# Patient Record
Sex: FEMALE | Race: WHITE | NOT HISPANIC OR LATINO | Employment: UNEMPLOYED | URBAN - METROPOLITAN AREA
[De-identification: names, ages, dates, MRNs, and addresses within clinical notes are randomized per-mention and may not be internally consistent; named-entity substitution may affect disease eponyms.]

---

## 2017-02-20 DIAGNOSIS — J44.9 COPD (CHRONIC OBSTRUCTIVE PULMONARY DISEASE): ICD-10-CM

## 2017-02-20 RX ORDER — TIOTROPIUM BROMIDE 18 UG/1
CAPSULE ORAL; RESPIRATORY (INHALATION)
Qty: 30 CAPSULE | Refills: 11 | OUTPATIENT
Start: 2017-02-20

## 2017-09-05 RX ORDER — FLUTICASONE PROPIONATE AND SALMETEROL 50; 250 UG/1; UG/1
POWDER RESPIRATORY (INHALATION)
Qty: 60 EACH | Refills: 11 | OUTPATIENT
Start: 2017-09-05

## 2021-05-03 ENCOUNTER — HOSPITAL ENCOUNTER (EMERGENCY)
Facility: HOSPITAL | Age: 61
Discharge: HOME | End: 2021-05-03
Attending: EMERGENCY MEDICINE
Payer: COMMERCIAL

## 2021-05-03 VITALS
TEMPERATURE: 98 F | RESPIRATION RATE: 23 BRPM | OXYGEN SATURATION: 100 % | BODY MASS INDEX: 21.67 KG/M2 | SYSTOLIC BLOOD PRESSURE: 112 MMHG | HEIGHT: 66 IN | DIASTOLIC BLOOD PRESSURE: 64 MMHG | WEIGHT: 134.81 LBS | HEART RATE: 79 BPM

## 2021-05-03 DIAGNOSIS — N39.0 URINARY TRACT INFECTION WITHOUT HEMATURIA, SITE UNSPECIFIED: ICD-10-CM

## 2021-05-03 DIAGNOSIS — R00.2 PALPITATIONS: ICD-10-CM

## 2021-05-03 DIAGNOSIS — R53.1 WEAKNESS: Primary | ICD-10-CM

## 2021-05-03 LAB
ALBUMIN SERPL BCP-MCNC: 3.1 G/DL (ref 3.5–5.2)
ALP SERPL-CCNC: 54 U/L (ref 55–135)
ALT SERPL W/O P-5'-P-CCNC: 7 U/L (ref 10–44)
ANION GAP SERPL CALC-SCNC: 15 MMOL/L (ref 8–16)
AST SERPL-CCNC: 11 U/L (ref 10–40)
BACTERIA #/AREA URNS HPF: NORMAL /HPF
BASOPHILS NFR BLD: 0 % (ref 0–1.9)
BILIRUB SERPL-MCNC: 0.3 MG/DL (ref 0.1–1)
BILIRUB UR QL STRIP: ABNORMAL
BNP SERPL-MCNC: 76 PG/ML (ref 0–99)
BUN SERPL-MCNC: 8 MG/DL (ref 8–23)
CALCIUM SERPL-MCNC: 8.4 MG/DL (ref 8.7–10.5)
CHLORIDE SERPL-SCNC: 95 MMOL/L (ref 95–110)
CK SERPL-CCNC: 42 U/L (ref 20–180)
CLARITY UR: CLEAR
CO2 SERPL-SCNC: 25 MMOL/L (ref 23–29)
COLOR UR: ABNORMAL
CREAT SERPL-MCNC: 0.8 MG/DL (ref 0.5–1.4)
DIFFERENTIAL METHOD: ABNORMAL
EOSINOPHIL NFR BLD: 0 % (ref 0–8)
ERYTHROCYTE [DISTWIDTH] IN BLOOD BY AUTOMATED COUNT: 14 % (ref 11.5–14.5)
EST. GFR  (AFRICAN AMERICAN): >60 ML/MIN/1.73 M^2
EST. GFR  (NON AFRICAN AMERICAN): >60 ML/MIN/1.73 M^2
GLUCOSE SERPL-MCNC: 75 MG/DL (ref 70–110)
GLUCOSE UR QL STRIP: ABNORMAL
HCT VFR BLD AUTO: 31.4 % (ref 37–48.5)
HGB BLD-MCNC: 9.8 G/DL (ref 12–16)
HGB UR QL STRIP: ABNORMAL
IMM GRANULOCYTES # BLD AUTO: ABNORMAL K/UL (ref 0–0.04)
IMM GRANULOCYTES NFR BLD AUTO: ABNORMAL % (ref 0–0.5)
KETONES UR QL STRIP: ABNORMAL
LEUKOCYTE ESTERASE UR QL STRIP: ABNORMAL
LYMPHOCYTES NFR BLD: 64 % (ref 18–48)
MCH RBC QN AUTO: 28.7 PG (ref 27–31)
MCHC RBC AUTO-ENTMCNC: 31.2 G/DL (ref 32–36)
MCV RBC AUTO: 92 FL (ref 82–98)
MICROSCOPIC COMMENT: NORMAL
MONOCYTES NFR BLD: 8 % (ref 4–15)
NEUTROPHILS NFR BLD: 28 % (ref 38–73)
NITRITE UR QL STRIP: ABNORMAL
NRBC BLD-RTO: 0 /100 WBC
PH UR STRIP: ABNORMAL [PH] (ref 5–8)
PLATELET # BLD AUTO: 321 K/UL (ref 150–450)
PMV BLD AUTO: 9.8 FL (ref 9.2–12.9)
POTASSIUM SERPL-SCNC: 3.8 MMOL/L (ref 3.5–5.1)
PROT SERPL-MCNC: 6.1 G/DL (ref 6–8.4)
PROT UR QL STRIP: ABNORMAL
RBC # BLD AUTO: 3.41 M/UL (ref 4–5.4)
RBC #/AREA URNS HPF: 1 /HPF (ref 0–4)
SODIUM SERPL-SCNC: 135 MMOL/L (ref 136–145)
SP GR UR STRIP: ABNORMAL (ref 1–1.03)
TROPONIN I SERPL DL<=0.01 NG/ML-MCNC: <0.006 NG/ML (ref 0–0.03)
URN SPEC COLLECT METH UR: ABNORMAL
UROBILINOGEN UR STRIP-ACNC: ABNORMAL EU/DL
WBC # BLD AUTO: 21.18 K/UL (ref 3.9–12.7)
WBC #/AREA URNS HPF: 1 /HPF (ref 0–5)

## 2021-05-03 PROCEDURE — 93010 EKG 12-LEAD: ICD-10-PCS | Mod: ,,, | Performed by: INTERNAL MEDICINE

## 2021-05-03 PROCEDURE — 84484 ASSAY OF TROPONIN QUANT: CPT | Performed by: EMERGENCY MEDICINE

## 2021-05-03 PROCEDURE — 93010 ELECTROCARDIOGRAM REPORT: CPT | Mod: ,,, | Performed by: INTERNAL MEDICINE

## 2021-05-03 PROCEDURE — 81000 URINALYSIS NONAUTO W/SCOPE: CPT | Performed by: EMERGENCY MEDICINE

## 2021-05-03 PROCEDURE — 99285 EMERGENCY DEPT VISIT HI MDM: CPT | Mod: 25

## 2021-05-03 PROCEDURE — 93005 ELECTROCARDIOGRAM TRACING: CPT

## 2021-05-03 PROCEDURE — 82550 ASSAY OF CK (CPK): CPT | Performed by: EMERGENCY MEDICINE

## 2021-05-03 PROCEDURE — 80053 COMPREHEN METABOLIC PANEL: CPT | Performed by: EMERGENCY MEDICINE

## 2021-05-03 PROCEDURE — 25000003 PHARM REV CODE 250: Performed by: EMERGENCY MEDICINE

## 2021-05-03 PROCEDURE — 85027 COMPLETE CBC AUTOMATED: CPT | Performed by: EMERGENCY MEDICINE

## 2021-05-03 PROCEDURE — 85060 PATHOLOGIST REVIEW: ICD-10-PCS | Mod: ,,, | Performed by: STUDENT IN AN ORGANIZED HEALTH CARE EDUCATION/TRAINING PROGRAM

## 2021-05-03 PROCEDURE — 85060 BLOOD SMEAR INTERPRETATION: CPT | Mod: ,,, | Performed by: STUDENT IN AN ORGANIZED HEALTH CARE EDUCATION/TRAINING PROGRAM

## 2021-05-03 PROCEDURE — 83880 ASSAY OF NATRIURETIC PEPTIDE: CPT | Performed by: EMERGENCY MEDICINE

## 2021-05-03 PROCEDURE — 85007 BL SMEAR W/DIFF WBC COUNT: CPT | Performed by: EMERGENCY MEDICINE

## 2021-05-03 RX ORDER — DOXYCYCLINE 100 MG/1
100 CAPSULE ORAL 2 TIMES DAILY
Qty: 20 CAPSULE | Refills: 0 | Status: SHIPPED | OUTPATIENT
Start: 2021-05-03 | End: 2021-05-10

## 2021-05-03 RX ORDER — OXYCODONE AND ACETAMINOPHEN 10; 325 MG/1; MG/1
1 TABLET ORAL
Status: COMPLETED | OUTPATIENT
Start: 2021-05-03 | End: 2021-05-03

## 2021-05-03 RX ADMIN — OXYCODONE AND ACETAMINOPHEN 1 TABLET: 10; 325 TABLET ORAL at 03:05

## 2021-05-04 LAB — PATH REV BLD -IMP: NORMAL

## 2021-05-10 ENCOUNTER — PATIENT MESSAGE (OUTPATIENT)
Dept: RESEARCH | Facility: HOSPITAL | Age: 61
End: 2021-05-10

## 2022-11-06 ENCOUNTER — HOSPITAL ENCOUNTER (INPATIENT)
Facility: HOSPITAL | Age: 62
LOS: 1 days | Discharge: HOME | DRG: 189 | End: 2022-11-08
Attending: EMERGENCY MEDICINE | Admitting: INTERNAL MEDICINE
Payer: COMMERCIAL

## 2022-11-06 DIAGNOSIS — J44.9 COPD, VERY SEVERE: Chronic | ICD-10-CM

## 2022-11-06 DIAGNOSIS — R06.02 SOB (SHORTNESS OF BREATH): Primary | ICD-10-CM

## 2022-11-06 DIAGNOSIS — J18.9 PNEUMONIA OF BOTH LOWER LOBES DUE TO INFECTIOUS ORGANISM: ICD-10-CM

## 2022-11-06 DIAGNOSIS — R50.9 FEVER, UNSPECIFIED FEVER CAUSE: ICD-10-CM

## 2022-11-06 PROBLEM — N30.00 ACUTE CYSTITIS WITHOUT HEMATURIA: Status: ACTIVE | Noted: 2022-11-06

## 2022-11-06 LAB
ACANTHOCYTES BLD QL SMEAR: PRESENT
ALBUMIN SERPL BCP-MCNC: 3.7 G/DL (ref 3.5–5.2)
ALLENS TEST: ABNORMAL
ALP SERPL-CCNC: 76 U/L (ref 55–135)
ALT SERPL W/O P-5'-P-CCNC: 8 U/L (ref 10–44)
ANION GAP SERPL CALC-SCNC: 20 MMOL/L (ref 8–16)
ANISOCYTOSIS BLD QL SMEAR: SLIGHT
AST SERPL-CCNC: 19 U/L (ref 10–40)
BACTERIA #/AREA URNS HPF: NORMAL /HPF
BASOPHILS NFR BLD: 0 % (ref 0–1.9)
BILIRUB SERPL-MCNC: 0.5 MG/DL (ref 0.1–1)
BILIRUB UR QL STRIP: ABNORMAL
BNP SERPL-MCNC: 90 PG/ML (ref 0–99)
BUN SERPL-MCNC: 9 MG/DL (ref 8–23)
CALCIUM SERPL-MCNC: 8.3 MG/DL (ref 8.7–10.5)
CHLORIDE SERPL-SCNC: 95 MMOL/L (ref 95–110)
CK SERPL-CCNC: 54 U/L (ref 20–180)
CLARITY UR: CLEAR
CO2 SERPL-SCNC: 27 MMOL/L (ref 23–29)
COLOR UR: YELLOW
CREAT SERPL-MCNC: 0.7 MG/DL (ref 0.5–1.4)
CTP QC/QA: YES
CTP QC/QA: YES
DELSYS: ABNORMAL
DIFFERENTIAL METHOD: ABNORMAL
EOSINOPHIL NFR BLD: 1 % (ref 0–8)
ERYTHROCYTE [DISTWIDTH] IN BLOOD BY AUTOMATED COUNT: 13.8 % (ref 11.5–14.5)
EST. GFR  (NO RACE VARIABLE): >60 ML/MIN/1.73 M^2
FLOW: 3
GIANT PLATELETS BLD QL SMEAR: PRESENT
GLUCOSE SERPL-MCNC: 125 MG/DL (ref 70–110)
GLUCOSE UR QL STRIP: NEGATIVE
HCO3 UR-SCNC: 31.8 MMOL/L (ref 24–28)
HCT VFR BLD AUTO: 36.3 % (ref 37–48.5)
HGB BLD-MCNC: 11.5 G/DL (ref 12–16)
HGB UR QL STRIP: NEGATIVE
HYPOCHROMIA BLD QL SMEAR: ABNORMAL
IMM GRANULOCYTES # BLD AUTO: ABNORMAL K/UL (ref 0–0.04)
IMM GRANULOCYTES NFR BLD AUTO: ABNORMAL % (ref 0–0.5)
KETONES UR QL STRIP: ABNORMAL
LACTATE SERPL-SCNC: 1.8 MMOL/L (ref 0.5–2.2)
LEUKOCYTE ESTERASE UR QL STRIP: NEGATIVE
LYMPHOCYTES NFR BLD: 72 % (ref 18–48)
MCH RBC QN AUTO: 28.6 PG (ref 27–31)
MCHC RBC AUTO-ENTMCNC: 31.7 G/DL (ref 32–36)
MCV RBC AUTO: 90 FL (ref 82–98)
MICROSCOPIC COMMENT: NORMAL
MODE: ABNORMAL
MONOCYTES NFR BLD: 24 % (ref 4–15)
MYELOCYTES NFR BLD MANUAL: 3 %
NEUTROPHILS NFR BLD: 0 % (ref 38–73)
NITRITE UR QL STRIP: POSITIVE
NRBC BLD-RTO: 0 /100 WBC
OVALOCYTES BLD QL SMEAR: ABNORMAL
PCO2 BLDA: 45 MMHG (ref 35–45)
PH SMN: 7.46 [PH] (ref 7.35–7.45)
PH UR STRIP: 6 [PH] (ref 5–8)
PLATELET # BLD AUTO: 319 K/UL (ref 150–450)
PLATELET BLD QL SMEAR: ABNORMAL
PMV BLD AUTO: 10.5 FL (ref 9.2–12.9)
PO2 BLDA: 76 MMHG (ref 80–100)
POC BE: 8 MMOL/L
POC MOLECULAR INFLUENZA A AGN: NEGATIVE
POC MOLECULAR INFLUENZA B AGN: NEGATIVE
POC SATURATED O2: 96 % (ref 95–100)
POIKILOCYTOSIS BLD QL SMEAR: SLIGHT
POTASSIUM SERPL-SCNC: 3.6 MMOL/L (ref 3.5–5.1)
PROCALCITONIN SERPL IA-MCNC: 0.02 NG/ML
PROT SERPL-MCNC: 7.2 G/DL (ref 6–8.4)
PROT UR QL STRIP: ABNORMAL
RBC # BLD AUTO: 4.02 M/UL (ref 4–5.4)
RBC #/AREA URNS HPF: 1 /HPF (ref 0–4)
SAMPLE: ABNORMAL
SARS-COV-2 RDRP RESP QL NAA+PROBE: NEGATIVE
SITE: ABNORMAL
SMUDGE CELLS BLD QL SMEAR: PRESENT
SODIUM SERPL-SCNC: 142 MMOL/L (ref 136–145)
SP GR UR STRIP: 1.01 (ref 1–1.03)
TROPONIN I SERPL DL<=0.01 NG/ML-MCNC: <0.006 NG/ML (ref 0–0.03)
URN SPEC COLLECT METH UR: ABNORMAL
UROBILINOGEN UR STRIP-ACNC: ABNORMAL EU/DL
WBC # BLD AUTO: 21.81 K/UL (ref 3.9–12.7)

## 2022-11-06 PROCEDURE — 85060 PATHOLOGIST REVIEW: ICD-10-PCS | Mod: ,,, | Performed by: PATHOLOGY

## 2022-11-06 PROCEDURE — 85060 BLOOD SMEAR INTERPRETATION: CPT | Mod: ,,, | Performed by: PATHOLOGY

## 2022-11-06 PROCEDURE — 94640 AIRWAY INHALATION TREATMENT: CPT

## 2022-11-06 PROCEDURE — 96365 THER/PROPH/DIAG IV INF INIT: CPT

## 2022-11-06 PROCEDURE — G0378 HOSPITAL OBSERVATION PER HR: HCPCS

## 2022-11-06 PROCEDURE — 87040 BLOOD CULTURE FOR BACTERIA: CPT | Mod: 59 | Performed by: EMERGENCY MEDICINE

## 2022-11-06 PROCEDURE — 93010 ELECTROCARDIOGRAM REPORT: CPT | Mod: ,,, | Performed by: INTERNAL MEDICINE

## 2022-11-06 PROCEDURE — 85027 COMPLETE CBC AUTOMATED: CPT | Performed by: EMERGENCY MEDICINE

## 2022-11-06 PROCEDURE — 96375 TX/PRO/DX INJ NEW DRUG ADDON: CPT

## 2022-11-06 PROCEDURE — 94640 AIRWAY INHALATION TREATMENT: CPT | Mod: XB

## 2022-11-06 PROCEDURE — 94799 UNLISTED PULMONARY SVC/PX: CPT

## 2022-11-06 PROCEDURE — 93005 ELECTROCARDIOGRAM TRACING: CPT

## 2022-11-06 PROCEDURE — 25000003 PHARM REV CODE 250: Performed by: NURSE PRACTITIONER

## 2022-11-06 PROCEDURE — 80053 COMPREHEN METABOLIC PANEL: CPT | Performed by: EMERGENCY MEDICINE

## 2022-11-06 PROCEDURE — 84145 PROCALCITONIN (PCT): CPT | Performed by: NURSE PRACTITIONER

## 2022-11-06 PROCEDURE — 87205 SMEAR GRAM STAIN: CPT | Performed by: NURSE PRACTITIONER

## 2022-11-06 PROCEDURE — 81000 URINALYSIS NONAUTO W/SCOPE: CPT | Performed by: EMERGENCY MEDICINE

## 2022-11-06 PROCEDURE — 63600175 PHARM REV CODE 636 W HCPCS: Performed by: NURSE PRACTITIONER

## 2022-11-06 PROCEDURE — 25000003 PHARM REV CODE 250: Performed by: EMERGENCY MEDICINE

## 2022-11-06 PROCEDURE — 25000242 PHARM REV CODE 250 ALT 637 W/ HCPCS: Performed by: NURSE PRACTITIONER

## 2022-11-06 PROCEDURE — 82803 BLOOD GASES ANY COMBINATION: CPT

## 2022-11-06 PROCEDURE — 25000242 PHARM REV CODE 250 ALT 637 W/ HCPCS: Performed by: EMERGENCY MEDICINE

## 2022-11-06 PROCEDURE — 63600175 PHARM REV CODE 636 W HCPCS: Performed by: EMERGENCY MEDICINE

## 2022-11-06 PROCEDURE — 87635 SARS-COV-2 COVID-19 AMP PRB: CPT | Performed by: EMERGENCY MEDICINE

## 2022-11-06 PROCEDURE — 27000221 HC OXYGEN, UP TO 24 HOURS

## 2022-11-06 PROCEDURE — 87502 INFLUENZA DNA AMP PROBE: CPT

## 2022-11-06 PROCEDURE — 82550 ASSAY OF CK (CPK): CPT | Performed by: EMERGENCY MEDICINE

## 2022-11-06 PROCEDURE — 83605 ASSAY OF LACTIC ACID: CPT | Performed by: EMERGENCY MEDICINE

## 2022-11-06 PROCEDURE — 85007 BL SMEAR W/DIFF WBC COUNT: CPT | Performed by: EMERGENCY MEDICINE

## 2022-11-06 PROCEDURE — 93010 EKG 12-LEAD: ICD-10-PCS | Mod: ,,, | Performed by: INTERNAL MEDICINE

## 2022-11-06 PROCEDURE — 96367 TX/PROPH/DG ADDL SEQ IV INF: CPT

## 2022-11-06 PROCEDURE — 87077 CULTURE AEROBIC IDENTIFY: CPT | Performed by: NURSE PRACTITIONER

## 2022-11-06 PROCEDURE — 96376 TX/PRO/DX INJ SAME DRUG ADON: CPT

## 2022-11-06 PROCEDURE — 87070 CULTURE OTHR SPECIMN AEROBIC: CPT | Performed by: NURSE PRACTITIONER

## 2022-11-06 PROCEDURE — 99285 EMERGENCY DEPT VISIT HI MDM: CPT | Mod: 25

## 2022-11-06 PROCEDURE — 84484 ASSAY OF TROPONIN QUANT: CPT | Performed by: EMERGENCY MEDICINE

## 2022-11-06 PROCEDURE — 99900035 HC TECH TIME PER 15 MIN (STAT)

## 2022-11-06 PROCEDURE — 83880 ASSAY OF NATRIURETIC PEPTIDE: CPT | Performed by: EMERGENCY MEDICINE

## 2022-11-06 PROCEDURE — 36600 WITHDRAWAL OF ARTERIAL BLOOD: CPT

## 2022-11-06 PROCEDURE — 87186 SC STD MICRODIL/AGAR DIL: CPT | Performed by: NURSE PRACTITIONER

## 2022-11-06 RX ORDER — BUSPIRONE HYDROCHLORIDE 7.5 MG/1
7.5 TABLET ORAL 2 TIMES DAILY
COMMUNITY
End: 2023-03-02 | Stop reason: SDUPTHER

## 2022-11-06 RX ORDER — METHYLPREDNISOLONE SOD SUCC 125 MG
125 VIAL (EA) INJECTION
Status: COMPLETED | OUTPATIENT
Start: 2022-11-06 | End: 2022-11-06

## 2022-11-06 RX ORDER — SODIUM CHLORIDE 0.9 % (FLUSH) 0.9 %
10 SYRINGE (ML) INJECTION
Status: DISCONTINUED | OUTPATIENT
Start: 2022-11-06 | End: 2022-11-08 | Stop reason: HOSPADM

## 2022-11-06 RX ORDER — CLONAZEPAM 0.5 MG/1
0.5 TABLET ORAL DAILY
Status: DISCONTINUED | OUTPATIENT
Start: 2022-11-06 | End: 2022-11-08 | Stop reason: HOSPADM

## 2022-11-06 RX ORDER — ALPRAZOLAM 0.5 MG/1
0.5 TABLET ORAL 3 TIMES DAILY PRN
Status: DISCONTINUED | OUTPATIENT
Start: 2022-11-06 | End: 2022-11-08 | Stop reason: HOSPADM

## 2022-11-06 RX ORDER — CETIRIZINE HYDROCHLORIDE 10 MG/1
10 TABLET ORAL DAILY
Status: DISCONTINUED | OUTPATIENT
Start: 2022-11-06 | End: 2022-11-08 | Stop reason: HOSPADM

## 2022-11-06 RX ORDER — CALCITRIOL 0.25 UG/1
0.25 CAPSULE ORAL DAILY
COMMUNITY

## 2022-11-06 RX ORDER — FAMOTIDINE 20 MG/1
20 TABLET, FILM COATED ORAL 2 TIMES DAILY
Status: DISCONTINUED | OUTPATIENT
Start: 2022-11-06 | End: 2022-11-08 | Stop reason: HOSPADM

## 2022-11-06 RX ORDER — LEVOTHYROXINE SODIUM 125 UG/1
125 TABLET ORAL
COMMUNITY

## 2022-11-06 RX ORDER — TIOTROPIUM BROMIDE 18 UG/1
18 CAPSULE ORAL; RESPIRATORY (INHALATION) DAILY
COMMUNITY

## 2022-11-06 RX ORDER — DOCUSATE SODIUM 100 MG/1
100 CAPSULE, LIQUID FILLED ORAL 3 TIMES DAILY PRN
Status: DISCONTINUED | OUTPATIENT
Start: 2022-11-06 | End: 2022-11-08 | Stop reason: HOSPADM

## 2022-11-06 RX ORDER — BUPROPION HYDROCHLORIDE 150 MG/1
150 TABLET ORAL 2 TIMES DAILY
Status: DISCONTINUED | OUTPATIENT
Start: 2022-11-06 | End: 2022-11-08 | Stop reason: HOSPADM

## 2022-11-06 RX ORDER — IPRATROPIUM BROMIDE AND ALBUTEROL SULFATE 2.5; .5 MG/3ML; MG/3ML
3 SOLUTION RESPIRATORY (INHALATION)
Status: COMPLETED | OUTPATIENT
Start: 2022-11-06 | End: 2022-11-06

## 2022-11-06 RX ORDER — ARFORMOTEROL TARTRATE 15 UG/2ML
15 SOLUTION RESPIRATORY (INHALATION) 2 TIMES DAILY
Status: DISCONTINUED | OUTPATIENT
Start: 2022-11-06 | End: 2022-11-08 | Stop reason: HOSPADM

## 2022-11-06 RX ORDER — OXYCODONE AND ACETAMINOPHEN 10; 325 MG/1; MG/1
1 TABLET ORAL EVERY 6 HOURS PRN
Status: DISCONTINUED | OUTPATIENT
Start: 2022-11-06 | End: 2022-11-08 | Stop reason: HOSPADM

## 2022-11-06 RX ORDER — TIZANIDINE 4 MG/1
4 TABLET ORAL 3 TIMES DAILY
Status: DISCONTINUED | OUTPATIENT
Start: 2022-11-06 | End: 2022-11-08 | Stop reason: HOSPADM

## 2022-11-06 RX ORDER — FLUTICASONE PROPIONATE 50 MCG
2 SPRAY, SUSPENSION (ML) NASAL 2 TIMES DAILY
Status: DISCONTINUED | OUTPATIENT
Start: 2022-11-06 | End: 2022-11-08 | Stop reason: HOSPADM

## 2022-11-06 RX ORDER — ZOLPIDEM TARTRATE 5 MG/1
5 TABLET ORAL NIGHTLY PRN
Status: DISCONTINUED | OUTPATIENT
Start: 2022-11-06 | End: 2022-11-08 | Stop reason: HOSPADM

## 2022-11-06 RX ORDER — IPRATROPIUM BROMIDE AND ALBUTEROL SULFATE 2.5; .5 MG/3ML; MG/3ML
3 SOLUTION RESPIRATORY (INHALATION) EVERY 4 HOURS PRN
Status: DISCONTINUED | OUTPATIENT
Start: 2022-11-06 | End: 2022-11-08 | Stop reason: HOSPADM

## 2022-11-06 RX ORDER — POLYETHYLENE GLYCOL 3350 17 G/17G
17 POWDER, FOR SOLUTION ORAL DAILY PRN
Status: DISCONTINUED | OUTPATIENT
Start: 2022-11-06 | End: 2022-11-08 | Stop reason: HOSPADM

## 2022-11-06 RX ORDER — DOXYCYCLINE HYCLATE 100 MG
100 TABLET ORAL EVERY 12 HOURS
Status: DISCONTINUED | OUTPATIENT
Start: 2022-11-06 | End: 2022-11-08 | Stop reason: HOSPADM

## 2022-11-06 RX ORDER — ACETAMINOPHEN 325 MG/1
650 TABLET ORAL EVERY 4 HOURS PRN
Status: DISCONTINUED | OUTPATIENT
Start: 2022-11-06 | End: 2022-11-08 | Stop reason: HOSPADM

## 2022-11-06 RX ORDER — GABAPENTIN 400 MG/1
400 CAPSULE ORAL 4 TIMES DAILY
Status: DISCONTINUED | OUTPATIENT
Start: 2022-11-06 | End: 2022-11-08 | Stop reason: HOSPADM

## 2022-11-06 RX ORDER — DILTIAZEM HYDROCHLORIDE 120 MG/1
120 TABLET, FILM COATED ORAL 2 TIMES DAILY
COMMUNITY
End: 2023-02-20 | Stop reason: DRUGHIGH

## 2022-11-06 RX ORDER — MONTELUKAST SODIUM 10 MG/1
10 TABLET ORAL DAILY
COMMUNITY

## 2022-11-06 RX ORDER — BUDESONIDE 0.5 MG/2ML
0.5 INHALANT ORAL EVERY 12 HOURS
Status: DISCONTINUED | OUTPATIENT
Start: 2022-11-06 | End: 2022-11-08 | Stop reason: HOSPADM

## 2022-11-06 RX ADMIN — TIZANIDINE 4 MG: 4 TABLET ORAL at 09:11

## 2022-11-06 RX ADMIN — BUPROPION HYDROCHLORIDE 150 MG: 150 TABLET, FILM COATED, EXTENDED RELEASE ORAL at 09:11

## 2022-11-06 RX ADMIN — ALPRAZOLAM 0.5 MG: 0.5 TABLET ORAL at 09:11

## 2022-11-06 RX ADMIN — CLONAZEPAM 0.5 MG: 0.5 TABLET ORAL at 11:11

## 2022-11-06 RX ADMIN — OXYCODONE AND ACETAMINOPHEN 1 TABLET: 10; 325 TABLET ORAL at 11:11

## 2022-11-06 RX ADMIN — FAMOTIDINE 20 MG: 20 TABLET ORAL at 09:11

## 2022-11-06 RX ADMIN — BUPROPION HYDROCHLORIDE 150 MG: 150 TABLET, FILM COATED, EXTENDED RELEASE ORAL at 12:11

## 2022-11-06 RX ADMIN — ARFORMOTEROL TARTRATE 15 MCG: 15 SOLUTION RESPIRATORY (INHALATION) at 07:11

## 2022-11-06 RX ADMIN — DOXYCYCLINE HYCLATE 100 MG: 100 TABLET, COATED ORAL at 09:11

## 2022-11-06 RX ADMIN — OXYCODONE AND ACETAMINOPHEN 1 TABLET: 10; 325 TABLET ORAL at 05:11

## 2022-11-06 RX ADMIN — ALPRAZOLAM 0.5 MG: 0.5 TABLET ORAL at 01:11

## 2022-11-06 RX ADMIN — GABAPENTIN 400 MG: 400 CAPSULE ORAL at 05:11

## 2022-11-06 RX ADMIN — FLUTICASONE PROPIONATE 100 MCG: 50 SPRAY, METERED NASAL at 11:11

## 2022-11-06 RX ADMIN — GABAPENTIN 400 MG: 400 CAPSULE ORAL at 09:11

## 2022-11-06 RX ADMIN — METHYLPREDNISOLONE SODIUM SUCCINATE 80 MG: 40 INJECTION, POWDER, FOR SOLUTION INTRAMUSCULAR; INTRAVENOUS at 05:11

## 2022-11-06 RX ADMIN — AZITHROMYCIN MONOHYDRATE 500 MG: 500 INJECTION, POWDER, LYOPHILIZED, FOR SOLUTION INTRAVENOUS at 10:11

## 2022-11-06 RX ADMIN — TIZANIDINE 4 MG: 4 TABLET ORAL at 05:11

## 2022-11-06 RX ADMIN — CETIRIZINE HYDROCHLORIDE 10 MG: 10 TABLET, FILM COATED ORAL at 11:11

## 2022-11-06 RX ADMIN — CEFTRIAXONE 1 G: 1 INJECTION, SOLUTION INTRAVENOUS at 09:11

## 2022-11-06 RX ADMIN — FAMOTIDINE 20 MG: 20 TABLET ORAL at 11:11

## 2022-11-06 RX ADMIN — FLUTICASONE PROPIONATE 100 MCG: 50 SPRAY, METERED NASAL at 09:11

## 2022-11-06 RX ADMIN — GABAPENTIN 400 MG: 400 CAPSULE ORAL at 12:11

## 2022-11-06 RX ADMIN — BUDESONIDE 0.5 MG: 0.5 INHALANT ORAL at 07:11

## 2022-11-06 RX ADMIN — METHYLPREDNISOLONE SODIUM SUCCINATE 125 MG: 125 INJECTION, POWDER, FOR SOLUTION INTRAMUSCULAR; INTRAVENOUS at 08:11

## 2022-11-06 RX ADMIN — IPRATROPIUM BROMIDE AND ALBUTEROL SULFATE 3 ML: 2.5; .5 SOLUTION RESPIRATORY (INHALATION) at 08:11

## 2022-11-06 NOTE — PLAN OF CARE
Problem: Adult Inpatient Plan of Care  Goal: Absence of Hospital-Acquired Illness or Injury  Outcome: Ongoing, Progressing  Goal: Optimal Comfort and Wellbeing  Outcome: Ongoing, Progressing     Problem: Respiratory Compromise (Pneumonia)  Goal: Effective Oxygenation and Ventilation  Outcome: Ongoing, Progressing     POC reviewed with pt. Pt verbalizes understanding of POC. No questions at this time.  AAOx4. NAD.  ST on cardiac monitor.  Pt remains free of falls.  No complaints at this time.  Safety measures in place.   Informed pt to call for assistance before getting up. Pt verbalizes understanding.

## 2022-11-06 NOTE — HPI
63 y/o female with PMHx of COPD, chronic respiratory failure with hypoxia, thyroid carcinoma, post-surgical hypothyroidism, osteoporosis, CLL, and anemia who presented to the ED with c/o SOB that onset gradually over the past 24 hours. Pt uses home oxygen. Sx are constant and moderate in severity. No exacerbating or mitigating factors reported. Associated sx include productive cough with purulent sputum, wheezes, fatigue, and weakness. Pt denies fever, chills, CP, abd pain, N/V/D, and all other sx at this time.  ED workup shows: WBC 21K, Pulse 124, RR 24, O2 sat 94% on 3L oxygen via NC  She is a full code and her SDM is her spouse, Chu  She will be kept on OBS for acute on chronic respiratory failure with hypoxia under the care of hospital medicine.

## 2022-11-06 NOTE — ED PROVIDER NOTES
SCRIBE #1 NOTE: I, Oren Aragon, am scribing for, and in the presence of, Xu Dugan MD. I have scribed the entire note.       History     Chief Complaint   Patient presents with    Shortness of Breath     Per [facility], patient c/o of SOB with wheezing, received a DuoNeb prior to arrival to ED; hx of COPD     Review of patient's allergies indicates:   Allergen Reactions    Levofloxacin     Nitrofurantoin monohyd/m-cryst Other (See Comments)    Penicillins Other (See Comments)    Ciprofloxacin Itching and Rash         History of Present Illness     HPI    2022, 7:57 AM  History obtained from the patient      History of Present Illness: Christine Horner is a 62 y.o. female patient with a PMHx of COPD who presents to the Emergency Department for evaluation of SOB which onset gradually 1 day PTA. Pt also reports thick green sputum. Symptoms are constant and moderate in severity. No mitigating or exacerbating factors reported. Associated sxs include cough. Patient denies any fever, chills, congestion, rhinorrhea, sore throat, CP, leg swelling, N/V/D, dizziness, HA, and all other sxs at this time. No further complaints or concerns at this time.       Arrival mode:  EMS      PCP: Eloy Hdez MD        Past Medical History:  Past Medical History:   Diagnosis Date    Arthritis     Chronic back pain     COPD (chronic obstructive pulmonary disease)     COPD with acute exacerbation 2016    Pneumonia     SOB (shortness of breath)        Past Surgical History:  Past Surgical History:   Procedure Laterality Date    BACK SURGERY       SECTION      TONSILLECTOMY      WISDOM TOOTH EXTRACTION           Family History:  Family History   Problem Relation Age of Onset    Cancer Father     Diabetes Maternal Grandmother        Social History:  Social History     Tobacco Use    Smoking status: Former     Packs/day: 1.00     Years: 30.00     Pack years: 30.00     Types: Cigarettes     Quit date: 1/3/2012     Years  since quitting: 10.8    Smokeless tobacco: Not on file   Substance and Sexual Activity    Alcohol use: No    Drug use: No    Sexual activity: Never     Birth control/protection: None        Review of Systems     Review of Systems   Constitutional:  Negative for chills and fever.   HENT:  Negative for congestion, rhinorrhea and sore throat.    Respiratory:  Positive for cough and shortness of breath.    Cardiovascular:  Negative for chest pain and leg swelling.   Gastrointestinal:  Negative for diarrhea, nausea and vomiting.   Neurological:  Negative for dizziness and headaches.      Physical Exam     Initial Vitals   BP Pulse Resp Temp SpO2   11/06/22 0759 11/06/22 0759 11/06/22 0759 11/06/22 0830 11/06/22 0759   118/75 106 (!) 24 97.8 °F (36.6 °C) 95 %      MAP       --                 Physical Exam  Nursing Notes and Vital Signs Reviewed.  Constitutional: Patient is in no acute distress. Well-developed and well-nourished.  Head: Atraumatic. Normocephalic.  Eyes: PERRL. EOM intact. Conjunctivae are not pale. No scleral icterus.  ENT: Mucous membranes are moist. Oropharynx is clear and symmetric.    Neck: Supple. Full ROM. No lymphadenopathy.  Cardiovascular: Regular rate. Regular rhythm. No murmurs, rubs, or gallops. Distal pulses are 2+ and symmetric.  Pulmonary/Chest: No respiratory distress. Coarse breath sounds. Bilateral diffuse wheezing. No rales.  Abdominal: Soft and non-distended.  There is no tenderness.  No rebound, guarding, or rigidity. Good bowel sounds.  Genitourinary: No CVA tenderness  Musculoskeletal: Moves all extremities. No obvious deformities. No edema. No calf tenderness.  Skin: Warm and dry.  Neurological:  Alert, awake, and appropriate.  Normal speech.  No acute focal neurological deficits are appreciated.  Psychiatric: Normal affect. Good eye contact. Appropriate in content.     ED Course   Procedures  ED Vital Signs:  Vitals:    11/06/22 0759 11/06/22 0817 11/06/22 0821 11/06/22 0830   BP:  "118/75      Pulse: 106 (!) 123     Resp: (!) 24 (!) 22     Temp:    97.8 °F (36.6 °C)   TempSrc:    Oral   SpO2: 95% (!) 94%     Weight:   61.1 kg (134 lb 11.2 oz)    Height: 5' 6" (1.676 m)          Abnormal Lab Results:  Labs Reviewed   COMPREHENSIVE METABOLIC PANEL - Abnormal; Notable for the following components:       Result Value    Glucose 125 (*)     Calcium 8.3 (*)     ALT 8 (*)     Anion Gap 20 (*)     All other components within normal limits   CBC W/ AUTO DIFFERENTIAL - Abnormal; Notable for the following components:    WBC 21.81 (*)     Hemoglobin 11.5 (*)     Hematocrit 36.3 (*)     MCHC 31.7 (*)     All other components within normal limits   CULTURE, BLOOD   CULTURE, BLOOD   B-TYPE NATRIURETIC PEPTIDE   CK   TROPONIN I   LACTIC ACID, PLASMA   URINALYSIS, REFLEX TO URINE CULTURE   PROCALCITONIN   SARS-COV-2 RDRP GENE    Narrative:     This test utilizes isothermal nucleic acid amplification   technology to detect the SARS-CoV-2 RdRp nucleic acid segment.   The analytical sensitivity (limit of detection) is 125 genome   equivalents/mL.   A POSITIVE result implies infection with the SARS-CoV-2 virus;   the patient is presumed to be contagious.     A NEGATIVE result means that SARS-CoV-2 nucleic acids are not   present above the limit of detection. A NEGATIVE result should be   treated as presumptive. It does not rule out the possibility of   COVID-19 and should not be the sole basis for treatment decisions.   If COVID-19 is strongly suspected based on clinical and exposure   history, re-testing using an alternate molecular assay should be   considered.   This test is only for use under the Food and Drug   Administration s Emergency Use Authorization (EUA).   Commercial kits are provided by [employer].   Performance characteristics of the EUA have been independently   verified by [facility] Department of   Pathology and Laboratory Medicine. "   _________________________________________________________________   The authorized Fact Sheet for Healthcare Providers and the authorized Fact   Sheet for Patients of the ID NOW COVID-19 are available on the FDA   website:     https://www.fda.gov/media/936192/download  https://www.fda.gov/media/124148/download           POCT INFLUENZA A/B MOLECULAR    Narrative:     This test utilizes isothermal nucleic acid amplification   technology to detect the SARS-CoV-2 RdRp nucleic acid segment.   The analytical sensitivity (limit of detection) is 125 genome   equivalents/mL.   A POSITIVE result implies infection with the SARS-CoV-2 virus;   the patient is presumed to be contagious.     A NEGATIVE result means that SARS-CoV-2 nucleic acids are not   present above the limit of detection. A NEGATIVE result should be   treated as presumptive. It does not rule out the possibility of   COVID-19 and should not be the sole basis for treatment decisions.   If COVID-19 is strongly suspected based on clinical and exposure   history, re-testing using an alternate molecular assay should be   considered.   This test is only for use under the Food and Drug   Administration s Emergency Use Authorization (EUA).   Commercial kits are provided by [employer].   Performance characteristics of the EUA have been independently   verified by [facility] Department of   Pathology and Laboratory Medicine.   _________________________________________________________________   The authorized Fact Sheet for Healthcare Providers and the authorized Fact   Sheet for Patients of the ID NOW COVID-19 are available on the FDA   website:     https://www.fda.gov/media/125474/download  https://www.fda.gov/media/676167/download                All Lab Results:  Results for orders placed or performed during the hospital encounter of 11/06/22   Comprehensive Metabolic Panel   Result Value Ref Range    Sodium 142 136 - 145 mmol/L    Potassium 3.6  3.5 - 5.1 mmol/L    Chloride 95 95 - 110 mmol/L    CO2 27 23 - 29 mmol/L    Glucose 125 (H) 70 - 110 mg/dL    BUN 9 8 - 23 mg/dL    Creatinine 0.7 0.5 - 1.4 mg/dL    Calcium 8.3 (L) 8.7 - 10.5 mg/dL    Total Protein 7.2 6.0 - 8.4 g/dL    Albumin 3.7 3.5 - 5.2 g/dL    Total Bilirubin 0.5 0.1 - 1.0 mg/dL    Alkaline Phosphatase 76 55 - 135 U/L    AST 19 10 - 40 U/L    ALT 8 (L) 10 - 44 U/L    Anion Gap 20 (H) 8 - 16 mmol/L    eGFR >60 >60 mL/min/1.73 m^2   CBC Auto Differential   Result Value Ref Range    WBC 21.81 (H) 3.90 - 12.70 K/uL    RBC 4.02 4.00 - 5.40 M/uL    Hemoglobin 11.5 (L) 12.0 - 16.0 g/dL    Hematocrit 36.3 (L) 37.0 - 48.5 %    MCV 90 82 - 98 fL    MCH 28.6 27.0 - 31.0 pg    MCHC 31.7 (L) 32.0 - 36.0 g/dL    RDW 13.8 11.5 - 14.5 %    Platelets 319 150 - 450 K/uL    MPV 10.5 9.2 - 12.9 fL   BNP   Result Value Ref Range    BNP 90 0 - 99 pg/mL   CK   Result Value Ref Range    CPK 54 20 - 180 U/L   Troponin I   Result Value Ref Range    Troponin I <0.006 0.000 - 0.026 ng/mL   Lactic acid, plasma   Result Value Ref Range    Lactate (Lactic Acid) 1.8 0.5 - 2.2 mmol/L   POCT COVID-19 Rapid Screening   Result Value Ref Range    POC Rapid COVID Negative Negative     Acceptable Yes    POCT Influenza A/B Molecular   Result Value Ref Range    POC Molecular Influenza A Ag Negative Negative, Not Reported    POC Molecular Influenza B Ag Negative Negative, Not Reported     Acceptable Yes          Imaging Results:  Imaging Results              X-Ray Chest AP Portable (Final result)  Result time 11/06/22 08:23:30      Final result by ELIECER Renteria Sr., MD (11/06/22 08:23:30)                   Impression:      1. There is a mild amount of haziness scattered throughout both lungs.  This is characteristic of pneumonia.  2. The right costophrenic angle is blunted.  This is characteristic of a tiny pleural effusion.  3. There are healed fractures on both sides of the thoracic  cage.  .      Electronically signed by: Sunny Renteria MD  Date:    11/06/2022  Time:    08:23               Narrative:    EXAMINATION:  XR CHEST AP PORTABLE    CLINICAL HISTORY:  sob;    COMPARISON:  05/03/2021    FINDINGS:  The size of the heart is normal.  There is a mild amount of haziness scattered throughout both lungs.  There is no pneumothorax.  The left costophrenic angle is sharp.  The right costophrenic angle is blunted.  There are healed fractures on both sides of the thoracic cage.                                       The EKG was ordered, reviewed, and independently interpreted by the ED provider.  Interpretation time: 08:16:26  Rate: 125 BPM  Rhythm: Sinus tachycardia with short [place]  Interpretation: Left atrial fascicular block. Anterior infarct, age undetermined, No acute ST changes. No STEMI.             The Emergency Provider reviewed the vital signs and test results, which are outlined above.     ED Discussion     9:26 AM: Discussed case with Mallory Talbert NP ([facility]). Dr. Torres agrees with current care and management of pt and accepts admission.   Admitting Service: Hospital Medicine  Admitting Physician: Dr. Torres  Admit to: Obs           Medical Decision Making:   Clinical Tests:   Lab Tests: Ordered and Reviewed  Radiological Study: Reviewed and Ordered  Medical Tests: Ordered and Reviewed         ED Medication(s):  Medications   azithromycin 500 mg in dextrose 5 % 250 mL IVPB (ready to mix system) (has no administration in time range)   albuterol-ipratropium 2.5 mg-0.5 mg/3 mL nebulizer solution 3 mL (3 mLs Nebulization Given 11/6/22 0817)   methylPREDNISolone sodium succinate injection 125 mg (125 mg Intravenous Given 11/6/22 0840)   cefTRIAXone (ROCEPHIN) 1 g/50 mL D5W IVPB (0 g Intravenous Stopped 11/6/22 0957)       New Prescriptions    No medications on file               Scribe Attestation:   Scribe #1: I performed the above scribed service and the documentation accurately  describes the services I performed. I attest to the accuracy of the note.     Attending:   Physician Attestation Statement for Scribe #1: I, Xu Dugan MD, personally performed the services described in this documentation, as scribed by Oren Aragon, in my presence, and it is both accurate and complete.           Clinical Impression       ICD-10-CM ICD-9-CM   1. SOB (shortness of breath)  R06.02 786.05   2. Pneumonia of both lower lobes due to infectious organism  J18.9 486   3. Fever, unspecified fever cause  R50.9 780.60       Disposition:   Disposition: Admitted  Condition: Stable       Xu Dugan MD  11/06/22 1009

## 2022-11-06 NOTE — ASSESSMENT & PLAN NOTE
--uses home oxygen, continue supplemental oxygen to maintain sats  --IV steroids  --Duonebs, brovana, budesonide  --IV azithromycin given in ED X1  --PO doxycycline  --imaging consistent with PNA and Procalcitonin negative

## 2022-11-06 NOTE — H&P
[facility] ([facility]  [facility] Medicine  History & Physical    Patient Name: Christine Horner  MRN: [id number]  Patient Class: OP- Observation  Admission Date: 2022  Attending Physician: Ramon Tenorio, *   Primary Care Provider: Eloy Hdez MD         Patient information was obtained from patient, spouse/SO, past medical records and ER records.     Subjective:     Principal Problem:Acute on chronic respiratory failure with hypoxia    Chief Complaint:   Chief Complaint   Patient presents with    Shortness of Breath     Per [facility], patient c/o of SOB with wheezing, received a DuoNeb prior to arrival to ED; hx of COPD        HPI: 63 y/o female with PMHx of COPD, chronic respiratory failure with hypoxia, thyroid carcinoma, post-surgical hypothyroidism, osteoporosis, CLL, and anemia who presented to the ED with c/o SOB that onset gradually over the past 24 hours. Pt uses home oxygen. Sx are constant and moderate in severity. No exacerbating or mitigating factors reported. Associated sx include productive cough with purulent sputum, wheezes, fatigue, and weakness. Pt denies fever, chills, CP, abd pain, N/V/D, and all other sx at this time.  ED workup shows: WBC 21K, Pulse 124, RR 24, O2 sat 94% on 3L oxygen via NC  She is a full code and her SDM is her spouse, Chu  She will be kept on OBS for acute on chronic respiratory failure with hypoxia under the care of [facility] medicine.    Past Medical History:   Diagnosis Date    Arthritis     Chronic back pain     COPD (chronic obstructive pulmonary disease)     COPD with acute exacerbation 2016    Pneumonia     SOB (shortness of breath)        Past Surgical History:   Procedure Laterality Date    BACK SURGERY       SECTION      TONSILLECTOMY      WISDOM TOOTH EXTRACTION         Review of patient's allergies indicates:   Allergen Reactions    Levofloxacin     Nitrofurantoin monohyd/m-cryst Other (See Comments)    Penicillins Other (See  Comments)    Ciprofloxacin Itching and Rash       No current facility-administered medications on file prior to encounter.     Current Outpatient Medications on File Prior to Encounter   Medication Sig    ADVAIR DISKUS 250-50 mcg/dose diskus inhaler USE 1 INHALATION TWICE DAILY **THANK YOU**    albuterol (PROAIR HFA) 90 mcg/actuation inhaler INHALE TWO PUFFS EVERY 4 TO 6 HOURS **THANK YOU**    albuterol (PROVENTIL) 2.5 mg /3 mL (0.083 %) nebulizer solution INHALE 1 VIAL VIA NEBULIZER EVERY 4 HOURS AS NEEDED **THANK YOU**    buPROPion (WELLBUTRIN XL) 150 MG TB24 tablet Take 1 tablet by mouth 2 (two) times daily.     clonazepam (KLONOPIN) 0.5 MG tablet once daily.     docusate sodium (COLACE) 100 MG capsule Take 1 capsule (100 mg total) by mouth 3 (three) times daily as needed for Constipation.    fluconazole (DIFLUCAN) 200 MG Tab Take 1 tablet (200 mg total) by mouth once daily.    fluticasone (FLONASE) 50 mcg/actuation nasal spray 2 sprays by Each Nare route 2 (two) times daily.    furosemide (LASIX) 20 MG tablet Take 1 tablet (20 mg total) by mouth once daily.    gabapentin (NEURONTIN) 400 MG capsule Take by mouth 4 (four) times daily.     levocetirizine (XYZAL) 5 MG tablet Take 1 tablet (5 mg total) by mouth every evening.    morphine (TAMI) 50 MG 24 hr capsule every 12 (twelve) hours.     oxycodone-acetaminophen  mg (PERCOCET)  mg per tablet Take 1 tablet by mouth every 6 (six) hours as needed.     roflumilast (DALIRESP) 500 mcg Tab TAKE ONE TABLET BY MOUTH EVERY DAY **THANK YOU**    tiotropium-olodaterol (STIOLTO RESPIMAT) 2.5-2.5 mcg/actuation Mist Inhale 2 puffs into the lungs once daily.    tizanidine (ZANAFLEX) 4 MG tablet Take 1 tablet by mouth 3 (three) times daily.     zolpidem (AMBIEN) 5 MG Tab nightly as needed.      Family History       Problem Relation (Age of Onset)    Cancer Father    Diabetes Maternal Grandmother          Tobacco Use    Smoking status: Former     Packs/day: 1.00      Years: 30.00     Pack years: 30.00     Types: Cigarettes     Quit date: 1/3/2012     Years since quitting: 10.8    Smokeless tobacco: Not on file   Substance and Sexual Activity    Alcohol use: No    Drug use: No    Sexual activity: Never     Birth control/protection: None     Review of Systems   Constitutional:  Positive for fatigue. Negative for activity change, appetite change, chills and fever.   HENT:  Negative for congestion, dental problem, ear pain, hearing loss, mouth sores, sinus pressure, sore throat, tinnitus and trouble swallowing.    Eyes:  Negative for pain, discharge, redness and visual disturbance.   Respiratory:  Positive for cough, shortness of breath and wheezing. Negative for apnea, choking and chest tightness.    Cardiovascular:  Negative for chest pain, palpitations and leg swelling.   Gastrointestinal:  Negative for abdominal distention, abdominal pain, anal bleeding, blood in stool, constipation, diarrhea, nausea, rectal pain and vomiting.   Endocrine: Negative for cold intolerance, heat intolerance, polydipsia and polyuria.   Genitourinary:  Negative for difficulty urinating, dysuria, flank pain, frequency, hematuria and urgency.   Musculoskeletal:  Negative for arthralgias, back pain, gait problem, joint swelling, myalgias, neck pain and neck stiffness.   Skin:  Negative for color change, rash and wound.   Allergic/Immunologic: Negative.    Neurological:  Positive for weakness. Negative for dizziness, tremors, seizures, syncope, speech difficulty, light-headedness and headaches.   Hematological: Negative.    Psychiatric/Behavioral:  Negative for agitation, behavioral problems, confusion and sleep disturbance. The patient is not nervous/anxious.    All other systems reviewed and are negative.  Objective:     Vital Signs (Most Recent):  Temp: 97.8 °F (36.6 °C) (11/06/22 0830)  Pulse: (!) 124 (11/06/22 1000)  Resp: (!) 24 (11/06/22 1000)  BP: 126/75 (11/06/22 1000)  SpO2: (!) 94 % (11/06/22  1000)   Vital Signs (24h Range):  Temp:  [97.8 °F (36.6 °C)] 97.8 °F (36.6 °C)  Pulse:  [106-124] 124  Resp:  [22-24] 24  SpO2:  [94 %-95 %] 94 %  BP: (118-126)/(75) 126/75     Weight: 61.1 kg (134 lb 11.2 oz)  Body mass index is 21.74 kg/m².    Physical Exam  Vitals and nursing note reviewed.   Constitutional:       General: She is not in acute distress.     Appearance: She is well-developed. She is not diaphoretic.   HENT:      Head: Normocephalic and atraumatic.      Nose: Nose normal.   Eyes:      General: No scleral icterus.     Extraocular Movements: Extraocular movements intact.      Pupils: Pupils are equal, round, and reactive to light.   Neck:      Thyroid: No thyromegaly.      Vascular: No JVD.      Trachea: No tracheal deviation.   Cardiovascular:      Rate and Rhythm: Normal rate and regular rhythm.      Heart sounds: Normal heart sounds. No murmur heard.    No friction rub. No gallop.   Pulmonary:      Effort: Pulmonary effort is normal. No respiratory distress.      Breath sounds: Wheezing present. No rales.   Chest:      Chest wall: No tenderness.   Abdominal:      General: Bowel sounds are normal. There is no distension.      Palpations: Abdomen is soft. There is no mass.      Tenderness: There is no abdominal tenderness.   Genitourinary:     Comments: deferred  Musculoskeletal:         General: No tenderness or deformity. Normal range of motion.      Cervical back: Normal range of motion and neck supple.   Skin:     General: Skin is warm and dry.      Capillary Refill: Capillary refill takes 2 to 3 seconds.      Coloration: Skin is not pale.      Findings: No erythema or rash.   Neurological:      Mental Status: She is alert and oriented to person, place, and time. Mental status is at baseline.      Cranial Nerves: No cranial nerve deficit.      Motor: No abnormal muscle tone.      Coordination: Coordination normal.   Psychiatric:         Mood and Affect: Mood normal.         Behavior: Behavior  normal.         CRANIAL NERVES     CN III, IV, VI   Pupils are equal, round, and reactive to light.     Significant Labs: All pertinent labs within the past 24 hours have been reviewed.  Recent Lab Results  (Last 5 results in the past 24 hours)        11/06/22  1049   11/06/22  1045   11/06/22  1008   11/06/22  0917   11/06/22  0856        POC Molecular Influenza A Ag         Negative       POC Molecular Influenza B Ag         Negative       Procalcitonin     0.02  Comment: A concentration < 0.25 ng/mL represents a low risk of bacterial   infection.  Procalcitonin may not be accurate among patients with localized   infection, recent trauma or major surgery, immunosuppressed state,   invasive fungal infection, renal dysfunction. Decisions regarding   initiation or continuation of antibiotic therapy should not be based   solely on procalcitonin levels.             Allens Test   Pass             Appearance, UA Clear               Bacteria, UA None               Bilirubin (UA) 1+  Comment: Positive urine bilirubin is not confirmed. Correlate with   serum bilirubin and clinical presentation.                 Site   LR             Color, UA Yellow               DelSys   Nasal Can             Flow   3             Glucose, UA Negative               Ketones, UA 3+               Lactate, Jeff       1.8  Comment: Falsely low lactic acid results can be found in samples   containing >=13.0 mg/dL total bilirubin and/or >=3.5 mg/dL   direct bilirubin.           Leukocytes, UA Negative               Microscopic Comment SEE COMMENT  Comment: Other formed elements not mentioned in the report are not   present in the microscopic examination.                  Mode   SPONT             NITRITE UA Positive               Occult Blood UA Negative               pH, UA 6.0               POC BE   8             POC HCO3   31.8             POC PCO2   45.0             POC PH   7.458             POC PO2   76             POC SATURATED O2   96              Protein, UA Trace  Comment: Recommend a 24 hour urine protein or a urine   protein/creatinine ratio if globulin induced proteinuria is  clinically suspected.                  Acceptable         Yes       RBC, UA 1               Sample   ARTERIAL             Specific [place], UA 1.015               Specimen UA Urine, Clean Catch               UROBILINOGEN UA 2.0-3.0                                      Significant Imaging: I have reviewed all pertinent imaging results/findings within the past 24 hours.    Assessment/Plan:     * Acute on chronic respiratory failure with hypoxia  --uses home oxygen  --duo talha lucioa, budesonide  --IV steroids  --IV azithromycin given in ED X 1  --PO doxycycline      Acute cystitis without hematuria  --WBC 21K, U/A shows + nitrites  --emperic abx      COPD with acute exacerbation  --uses home oxygen, continue supplemental oxygen to maintain sats  --IV steroids  --Duoneakua brovana, budesonide  --IV azithromycin given in ED X1  --PO doxycycline      VTE Risk Mitigation (From admission, onward)           Ordered     IP VTE HIGH RISK PATIENT  Once         11/06/22 1038     Place sequential compression device  Until discontinued         11/06/22 1038                       KETURAH Shepard  Department of Hospital Medicine   '[place] [facility] ([facility])

## 2022-11-06 NOTE — ASSESSMENT & PLAN NOTE
--uses home oxygen, continue supplemental oxygen to maintain sats  --IV steroids  --Duonebs, brovana, budesonide

## 2022-11-06 NOTE — SUBJECTIVE & OBJECTIVE
Past Medical History:   Diagnosis Date    Arthritis     Chronic back pain     COPD (chronic obstructive pulmonary disease)     COPD with acute exacerbation 2016    Pneumonia     SOB (shortness of breath)        Past Surgical History:   Procedure Laterality Date    BACK SURGERY       SECTION      TONSILLECTOMY      WISDOM TOOTH EXTRACTION         Review of patient's allergies indicates:   Allergen Reactions    Levofloxacin     Nitrofurantoin monohyd/m-cryst Other (See Comments)    Penicillins Other (See Comments)    Ciprofloxacin Itching and Rash       No current facility-administered medications on file prior to encounter.     Current Outpatient Medications on File Prior to Encounter   Medication Sig    ADVAIR DISKUS 250-50 mcg/dose diskus inhaler USE 1 INHALATION TWICE DAILY **THANK YOU**    albuterol (PROAIR HFA) 90 mcg/actuation inhaler INHALE TWO PUFFS EVERY 4 TO 6 HOURS **THANK YOU**    albuterol (PROVENTIL) 2.5 mg /3 mL (0.083 %) nebulizer solution INHALE 1 VIAL VIA NEBULIZER EVERY 4 HOURS AS NEEDED **THANK YOU**    buPROPion (WELLBUTRIN XL) 150 MG TB24 tablet Take 1 tablet by mouth 2 (two) times daily.     clonazepam (KLONOPIN) 0.5 MG tablet once daily.     docusate sodium (COLACE) 100 MG capsule Take 1 capsule (100 mg total) by mouth 3 (three) times daily as needed for Constipation.    fluconazole (DIFLUCAN) 200 MG Tab Take 1 tablet (200 mg total) by mouth once daily.    fluticasone (FLONASE) 50 mcg/actuation nasal spray 2 sprays by Each Nare route 2 (two) times daily.    furosemide (LASIX) 20 MG tablet Take 1 tablet (20 mg total) by mouth once daily.    gabapentin (NEURONTIN) 400 MG capsule Take by mouth 4 (four) times daily.     levocetirizine (XYZAL) 5 MG tablet Take 1 tablet (5 mg total) by mouth every evening.    morphine (TAMI) 50 MG 24 hr capsule every 12 (twelve) hours.     oxycodone-acetaminophen  mg (PERCOCET)  mg per tablet Take 1 tablet by mouth every 6 (six) hours as  needed.     roflumilast (DALIRESP) 500 mcg Tab TAKE ONE TABLET BY MOUTH EVERY DAY **THANK YOU**    tiotropium-olodaterol (STIOLTO RESPIMAT) 2.5-2.5 mcg/actuation Mist Inhale 2 puffs into the lungs once daily.    tizanidine (ZANAFLEX) 4 MG tablet Take 1 tablet by mouth 3 (three) times daily.     zolpidem (AMBIEN) 5 MG Tab nightly as needed.      Family History       Problem Relation (Age of Onset)    Cancer Father    Diabetes Maternal Grandmother          Tobacco Use    Smoking status: Former     Packs/day: 1.00     Years: 30.00     Pack years: 30.00     Types: Cigarettes     Quit date: 1/3/2012     Years since quitting: 10.8    Smokeless tobacco: Not on file   Substance and Sexual Activity    Alcohol use: No    Drug use: No    Sexual activity: Never     Birth control/protection: None     Review of Systems   Constitutional:  Positive for fatigue. Negative for activity change, appetite change, chills and fever.   HENT:  Negative for congestion, dental problem, ear pain, hearing loss, mouth sores, sinus pressure, sore throat, tinnitus and trouble swallowing.    Eyes:  Negative for pain, discharge, redness and visual disturbance.   Respiratory:  Positive for cough, shortness of breath and wheezing. Negative for apnea, choking and chest tightness.    Cardiovascular:  Negative for chest pain, palpitations and leg swelling.   Gastrointestinal:  Negative for abdominal distention, abdominal pain, anal bleeding, blood in stool, constipation, diarrhea, nausea, rectal pain and vomiting.   Endocrine: Negative for cold intolerance, heat intolerance, polydipsia and polyuria.   Genitourinary:  Negative for difficulty urinating, dysuria, flank pain, frequency, hematuria and urgency.   Musculoskeletal:  Negative for arthralgias, back pain, gait problem, joint swelling, myalgias, neck pain and neck stiffness.   Skin:  Negative for color change, rash and wound.   Allergic/Immunologic: Negative.    Neurological:  Positive for weakness.  Negative for dizziness, tremors, seizures, syncope, speech difficulty, light-headedness and headaches.   Hematological: Negative.    Psychiatric/Behavioral:  Negative for agitation, behavioral problems, confusion and sleep disturbance. The patient is not nervous/anxious.    All other systems reviewed and are negative.  Objective:     Vital Signs (Most Recent):  Temp: 97.8 °F (36.6 °C) (11/06/22 0830)  Pulse: (!) 124 (11/06/22 1000)  Resp: (!) 24 (11/06/22 1000)  BP: 126/75 (11/06/22 1000)  SpO2: (!) 94 % (11/06/22 1000)   Vital Signs (24h Range):  Temp:  [97.8 °F (36.6 °C)] 97.8 °F (36.6 °C)  Pulse:  [106-124] 124  Resp:  [22-24] 24  SpO2:  [94 %-95 %] 94 %  BP: (118-126)/(75) 126/75     Weight: 61.1 kg (134 lb 11.2 oz)  Body mass index is 21.74 kg/m².    Physical Exam  Vitals and nursing note reviewed.   Constitutional:       General: She is not in acute distress.     Appearance: She is well-developed. She is not diaphoretic.   HENT:      Head: Normocephalic and atraumatic.      Nose: Nose normal.   Eyes:      General: No scleral icterus.     Extraocular Movements: Extraocular movements intact.      Pupils: Pupils are equal, round, and reactive to light.   Neck:      Thyroid: No thyromegaly.      Vascular: No JVD.      Trachea: No tracheal deviation.   Cardiovascular:      Rate and Rhythm: Normal rate and regular rhythm.      Heart sounds: Normal heart sounds. No murmur heard.    No friction rub. No gallop.   Pulmonary:      Effort: Pulmonary effort is normal. No respiratory distress.      Breath sounds: Wheezing present. No rales.   Chest:      Chest wall: No tenderness.   Abdominal:      General: Bowel sounds are normal. There is no distension.      Palpations: Abdomen is soft. There is no mass.      Tenderness: There is no abdominal tenderness.   Genitourinary:     Comments: deferred  Musculoskeletal:         General: No tenderness or deformity. Normal range of motion.      Cervical back: Normal range of motion  and neck supple.   Skin:     General: Skin is warm and dry.      Capillary Refill: Capillary refill takes 2 to 3 seconds.      Coloration: Skin is not pale.      Findings: No erythema or rash.   Neurological:      Mental Status: She is alert and oriented to person, place, and time. Mental status is at baseline.      Cranial Nerves: No cranial nerve deficit.      Motor: No abnormal muscle tone.      Coordination: Coordination normal.   Psychiatric:         Mood and Affect: Mood normal.         Behavior: Behavior normal.         CRANIAL NERVES     CN III, IV, VI   Pupils are equal, round, and reactive to light.     Significant Labs: All pertinent labs within the past 24 hours have been reviewed.  Recent Lab Results  (Last 5 results in the past 24 hours)        11/06/22  1049   11/06/22  1045   11/06/22  1008   11/06/22  0917   11/06/22  0856        POC Molecular Influenza A Ag         Negative       POC Molecular Influenza B Ag         Negative       Procalcitonin     0.02  Comment: A concentration < 0.25 ng/mL represents a low risk of bacterial   infection.  Procalcitonin may not be accurate among patients with localized   infection, recent trauma or major surgery, immunosuppressed state,   invasive fungal infection, renal dysfunction. Decisions regarding   initiation or continuation of antibiotic therapy should not be based   solely on procalcitonin levels.             Allens Test   Pass             Appearance, UA Clear               Bacteria, UA None               Bilirubin (UA) 1+  Comment: Positive urine bilirubin is not confirmed. Correlate with   serum bilirubin and clinical presentation.                 Site   LR             Color, UA Yellow               DelSys   Nasal Can             Flow   3             Glucose, UA Negative               Ketones, UA 3+               Lactate, Jeff       1.8  Comment: Falsely low lactic acid results can be found in samples   containing >=13.0 mg/dL total bilirubin and/or >=3.5  mg/dL   direct bilirubin.           Leukocytes, UA Negative               Microscopic Comment SEE COMMENT  Comment: Other formed elements not mentioned in the report are not   present in the microscopic examination.                  Mode   SPONT             NITRITE UA Positive               Occult Blood UA Negative               pH, UA 6.0               POC BE   8             POC HCO3   31.8             POC PCO2   45.0             POC PH   7.458             POC PO2   76             POC SATURATED O2   96             Protein, UA Trace  Comment: Recommend a 24 hour urine protein or a urine   protein/creatinine ratio if globulin induced proteinuria is  clinically suspected.                  Acceptable         Yes       RBC, UA 1               Sample   ARTERIAL             Specific [place], UA 1.015               Specimen UA Urine, Clean Catch               UROBILINOGEN UA 2.0-3.0                                      Significant Imaging: I have reviewed all pertinent imaging results/findings within the past 24 hours.   none

## 2022-11-06 NOTE — ASSESSMENT & PLAN NOTE
--uses home oxygen  --duo nebs, brovana, budesonide  --IV steroids  --IV azithromycin given in ED X 1  --PO doxycycline

## 2022-11-07 DIAGNOSIS — J44.9 COPD, VERY SEVERE: Primary | Chronic | ICD-10-CM

## 2022-11-07 LAB
ANION GAP SERPL CALC-SCNC: 14 MMOL/L (ref 8–16)
ANISOCYTOSIS BLD QL SMEAR: SLIGHT
BASOPHILS # BLD AUTO: 0.02 K/UL (ref 0–0.2)
BASOPHILS NFR BLD: 0.1 % (ref 0–1.9)
BUN SERPL-MCNC: 11 MG/DL (ref 8–23)
CALCIUM SERPL-MCNC: 8 MG/DL (ref 8.7–10.5)
CHLORIDE SERPL-SCNC: 95 MMOL/L (ref 95–110)
CO2 SERPL-SCNC: 30 MMOL/L (ref 23–29)
CREAT SERPL-MCNC: 0.7 MG/DL (ref 0.5–1.4)
DIFFERENTIAL METHOD: ABNORMAL
EOSINOPHIL # BLD AUTO: 0 K/UL (ref 0–0.5)
EOSINOPHIL NFR BLD: 0 % (ref 0–8)
ERYTHROCYTE [DISTWIDTH] IN BLOOD BY AUTOMATED COUNT: 13.6 % (ref 11.5–14.5)
EST. GFR  (NO RACE VARIABLE): >60 ML/MIN/1.73 M^2
GLUCOSE SERPL-MCNC: 131 MG/DL (ref 70–110)
HCT VFR BLD AUTO: 30.4 % (ref 37–48.5)
HGB BLD-MCNC: 9.6 G/DL (ref 12–16)
HYPOCHROMIA BLD QL SMEAR: ABNORMAL
IMM GRANULOCYTES # BLD AUTO: 0.08 K/UL (ref 0–0.04)
IMM GRANULOCYTES NFR BLD AUTO: 0.3 % (ref 0–0.5)
LYMPHOCYTES # BLD AUTO: 17.9 K/UL (ref 1–4.8)
LYMPHOCYTES NFR BLD: 75.6 % (ref 18–48)
MCH RBC QN AUTO: 28 PG (ref 27–31)
MCHC RBC AUTO-ENTMCNC: 31.6 G/DL (ref 32–36)
MCV RBC AUTO: 89 FL (ref 82–98)
MONOCYTES # BLD AUTO: 0.7 K/UL (ref 0.3–1)
MONOCYTES NFR BLD: 2.8 % (ref 4–15)
NEUTROPHILS # BLD AUTO: 5 K/UL (ref 1.8–7.7)
NEUTROPHILS NFR BLD: 21.2 % (ref 38–73)
NRBC BLD-RTO: 0 /100 WBC
OVALOCYTES BLD QL SMEAR: ABNORMAL
PATH REV BLD -IMP: NORMAL
PATH REV BLD -IMP: NORMAL
PLATELET # BLD AUTO: 332 K/UL (ref 150–450)
PLATELET BLD QL SMEAR: ABNORMAL
PMV BLD AUTO: 10.4 FL (ref 9.2–12.9)
POIKILOCYTOSIS BLD QL SMEAR: SLIGHT
POTASSIUM SERPL-SCNC: 4 MMOL/L (ref 3.5–5.1)
RBC # BLD AUTO: 3.43 M/UL (ref 4–5.4)
SODIUM SERPL-SCNC: 139 MMOL/L (ref 136–145)
WBC # BLD AUTO: 23.61 K/UL (ref 3.9–12.7)

## 2022-11-07 PROCEDURE — 94761 N-INVAS EAR/PLS OXIMETRY MLT: CPT

## 2022-11-07 PROCEDURE — 85060 PATHOLOGIST REVIEW: ICD-10-PCS | Mod: ,,, | Performed by: PATHOLOGY

## 2022-11-07 PROCEDURE — 25000242 PHARM REV CODE 250 ALT 637 W/ HCPCS: Performed by: NURSE PRACTITIONER

## 2022-11-07 PROCEDURE — 96376 TX/PRO/DX INJ SAME DRUG ADON: CPT

## 2022-11-07 PROCEDURE — 85007 BL SMEAR W/DIFF WBC COUNT: CPT | Performed by: NURSE PRACTITIONER

## 2022-11-07 PROCEDURE — 27000221 HC OXYGEN, UP TO 24 HOURS

## 2022-11-07 PROCEDURE — 63600175 PHARM REV CODE 636 W HCPCS: Performed by: NURSE PRACTITIONER

## 2022-11-07 PROCEDURE — 21400001 HC TELEMETRY ROOM

## 2022-11-07 PROCEDURE — 94640 AIRWAY INHALATION TREATMENT: CPT

## 2022-11-07 PROCEDURE — 85060 BLOOD SMEAR INTERPRETATION: CPT | Mod: ,,, | Performed by: PATHOLOGY

## 2022-11-07 PROCEDURE — 85027 COMPLETE CBC AUTOMATED: CPT | Performed by: NURSE PRACTITIONER

## 2022-11-07 PROCEDURE — 94799 UNLISTED PULMONARY SVC/PX: CPT

## 2022-11-07 PROCEDURE — 80048 BASIC METABOLIC PNL TOTAL CA: CPT | Performed by: NURSE PRACTITIONER

## 2022-11-07 PROCEDURE — 25000003 PHARM REV CODE 250: Performed by: NURSE PRACTITIONER

## 2022-11-07 PROCEDURE — 99900035 HC TECH TIME PER 15 MIN (STAT)

## 2022-11-07 PROCEDURE — 36415 COLL VENOUS BLD VENIPUNCTURE: CPT | Performed by: NURSE PRACTITIONER

## 2022-11-07 PROCEDURE — 96374 THER/PROPH/DIAG INJ IV PUSH: CPT | Mod: 59

## 2022-11-07 RX ADMIN — BUDESONIDE 0.5 MG: 0.5 INHALANT ORAL at 07:11

## 2022-11-07 RX ADMIN — GABAPENTIN 400 MG: 400 CAPSULE ORAL at 01:11

## 2022-11-07 RX ADMIN — ARFORMOTEROL TARTRATE 15 MCG: 15 SOLUTION RESPIRATORY (INHALATION) at 07:11

## 2022-11-07 RX ADMIN — OXYCODONE AND ACETAMINOPHEN 1 TABLET: 10; 325 TABLET ORAL at 08:11

## 2022-11-07 RX ADMIN — CETIRIZINE HYDROCHLORIDE 10 MG: 10 TABLET, FILM COATED ORAL at 08:11

## 2022-11-07 RX ADMIN — METHYLPREDNISOLONE SODIUM SUCCINATE 80 MG: 40 INJECTION, POWDER, FOR SOLUTION INTRAMUSCULAR; INTRAVENOUS at 05:11

## 2022-11-07 RX ADMIN — METHYLPREDNISOLONE SODIUM SUCCINATE 80 MG: 40 INJECTION, POWDER, FOR SOLUTION INTRAMUSCULAR; INTRAVENOUS at 01:11

## 2022-11-07 RX ADMIN — OXYCODONE AND ACETAMINOPHEN 1 TABLET: 10; 325 TABLET ORAL at 12:11

## 2022-11-07 RX ADMIN — BUPROPION HYDROCHLORIDE 150 MG: 150 TABLET, FILM COATED, EXTENDED RELEASE ORAL at 10:11

## 2022-11-07 RX ADMIN — OXYCODONE AND ACETAMINOPHEN 1 TABLET: 10; 325 TABLET ORAL at 10:11

## 2022-11-07 RX ADMIN — DOXYCYCLINE HYCLATE 100 MG: 100 TABLET, COATED ORAL at 08:11

## 2022-11-07 RX ADMIN — BUPROPION HYDROCHLORIDE 150 MG: 150 TABLET, FILM COATED, EXTENDED RELEASE ORAL at 08:11

## 2022-11-07 RX ADMIN — GABAPENTIN 400 MG: 400 CAPSULE ORAL at 10:11

## 2022-11-07 RX ADMIN — TIZANIDINE 4 MG: 4 TABLET ORAL at 10:11

## 2022-11-07 RX ADMIN — CLONAZEPAM 0.5 MG: 0.5 TABLET ORAL at 08:11

## 2022-11-07 RX ADMIN — FAMOTIDINE 20 MG: 20 TABLET ORAL at 08:11

## 2022-11-07 RX ADMIN — FLUTICASONE PROPIONATE 100 MCG: 50 SPRAY, METERED NASAL at 10:11

## 2022-11-07 RX ADMIN — TIZANIDINE 4 MG: 4 TABLET ORAL at 08:11

## 2022-11-07 RX ADMIN — DOXYCYCLINE HYCLATE 100 MG: 100 TABLET, COATED ORAL at 10:11

## 2022-11-07 RX ADMIN — ALPRAZOLAM 0.5 MG: 0.5 TABLET ORAL at 01:11

## 2022-11-07 RX ADMIN — OXYCODONE AND ACETAMINOPHEN 1 TABLET: 10; 325 TABLET ORAL at 03:11

## 2022-11-07 RX ADMIN — GABAPENTIN 400 MG: 400 CAPSULE ORAL at 08:11

## 2022-11-07 RX ADMIN — FAMOTIDINE 20 MG: 20 TABLET ORAL at 10:11

## 2022-11-07 RX ADMIN — ALPRAZOLAM 0.5 MG: 0.5 TABLET ORAL at 10:11

## 2022-11-07 RX ADMIN — METHYLPREDNISOLONE SODIUM SUCCINATE 80 MG: 40 INJECTION, POWDER, FOR SOLUTION INTRAMUSCULAR; INTRAVENOUS at 12:11

## 2022-11-07 RX ADMIN — TIZANIDINE 4 MG: 4 TABLET ORAL at 03:11

## 2022-11-07 RX ADMIN — FLUTICASONE PROPIONATE 100 MCG: 50 SPRAY, METERED NASAL at 08:11

## 2022-11-07 RX ADMIN — ALPRAZOLAM 0.5 MG: 0.5 TABLET ORAL at 05:11

## 2022-11-07 RX ADMIN — GABAPENTIN 400 MG: 400 CAPSULE ORAL at 05:11

## 2022-11-07 NOTE — PLAN OF CARE
Patient updated on plan of care. Instructed patient to use call light for assistance, call light in reach. Hourly rounding performed, bed locked, side rails up, personal items within reach, bed alarm on, and in low position. 3L NC and VSSl.ucation provided, questions answered as of now. NSR on tele box #[id number].

## 2022-11-07 NOTE — SUBJECTIVE & OBJECTIVE
Interval History: pt in bed upon exam and reports dyspnea and fatigue.  Supplemental oxygen with 3L NC in place. Pt verbalized symptom improvement with current plan of care continued as previously prescribed.     Review of Systems   Constitutional:  Positive for fatigue. Negative for activity change, appetite change, chills and fever.   HENT:  Negative for congestion, dental problem, ear pain, hearing loss, mouth sores, sinus pressure, sore throat, tinnitus and trouble swallowing.    Eyes:  Negative for pain, discharge, redness and visual disturbance.   Respiratory:  Positive for cough, shortness of breath and wheezing. Negative for apnea, choking and chest tightness.    Cardiovascular:  Negative for chest pain, palpitations and leg swelling.   Gastrointestinal:  Negative for abdominal distention, abdominal pain, anal bleeding, blood in stool, constipation, diarrhea, nausea, rectal pain and vomiting.   Endocrine: Negative for cold intolerance, heat intolerance, polydipsia and polyuria.   Genitourinary:  Negative for difficulty urinating, dysuria, flank pain, frequency, hematuria and urgency.   Musculoskeletal:  Negative for arthralgias, back pain, gait problem, joint swelling, myalgias, neck pain and neck stiffness.   Skin:  Negative for color change, rash and wound.   Allergic/Immunologic: Negative.    Neurological:  Positive for weakness. Negative for dizziness, tremors, seizures, syncope, speech difficulty, light-headedness and headaches.   Hematological: Negative.    Psychiatric/Behavioral:  Negative for agitation, behavioral problems, confusion and sleep disturbance. The patient is not nervous/anxious.    All other systems reviewed and are negative.  Objective:     Vital Signs (Most Recent):  Temp: 97.9 °F (36.6 °C) (11/07/22 1113)  Pulse: 89 (11/07/22 1113)  Resp: 18 (11/07/22 1113)  BP: 112/75 (11/07/22 1113)  SpO2: 97 % (11/07/22 1113) Vital Signs (24h Range):  Temp:  [97.7 °F (36.5 °C)-98.4 °F (36.9 °C)]  97.9 °F (36.6 °C)  Pulse:  [] 89  Resp:  [16-18] 18  SpO2:  [93 %-98 %] 97 %  BP: (112-147)/(65-80) 112/75     Weight: 59.4 kg (130 lb 15.3 oz)  Body mass index is 21.14 kg/m².    Intake/Output Summary (Last 24 hours) at 11/7/2022 1137  Last data filed at 11/7/2022 0800  Gross per 24 hour   Intake 180 ml   Output --   Net 180 ml      Physical Exam  Vitals and nursing note reviewed.   Constitutional:       General: She is not in acute distress.     Appearance: She is well-developed. She is not diaphoretic.   HENT:      Head: Normocephalic and atraumatic.      Nose: Nose normal.   Eyes:      General: No scleral icterus.     Extraocular Movements: Extraocular movements intact.      Pupils: Pupils are equal, round, and reactive to light.   Neck:      Thyroid: No thyromegaly.      Vascular: No JVD.      Trachea: No tracheal deviation.   Cardiovascular:      Rate and Rhythm: Normal rate and regular rhythm.      Heart sounds: Normal heart sounds. No murmur heard.    No friction rub. No gallop.   Pulmonary:      Effort: Pulmonary effort is normal. No respiratory distress.      Breath sounds: Wheezing present. No rales.   Chest:      Chest wall: No tenderness.   Abdominal:      General: Bowel sounds are normal. There is no distension.      Palpations: Abdomen is soft. There is no mass.      Tenderness: There is no abdominal tenderness.   Genitourinary:     Comments: deferred  Musculoskeletal:         General: No tenderness or deformity. Normal range of motion.      Cervical back: Normal range of motion and neck supple.   Skin:     General: Skin is warm and dry.      Capillary Refill: Capillary refill takes 2 to 3 seconds.      Coloration: Skin is not pale.      Findings: No erythema or rash.   Neurological:      Mental Status: She is alert and oriented to person, place, and time. Mental status is at baseline.      Cranial Nerves: No cranial nerve deficit.      Motor: No abnormal muscle tone.      Coordination:  Coordination normal.   Psychiatric:         Mood and Affect: Mood normal.         Behavior: Behavior normal.       Significant Labs: All pertinent labs within the past 24 hours have been reviewed.  CBC:   Recent Labs   Lab 11/06/22 0815 11/07/22 0459   WBC 21.81* 23.61*   HGB 11.5* 9.6*   HCT 36.3* 30.4*    332     CMP:   Recent Labs   Lab 11/06/22 0815 11/07/22 0459    139   K 3.6 4.0   CL 95 95   CO2 27 30*   * 131*   BUN 9 11   CREATININE 0.7 0.7   CALCIUM 8.3* 8.0*   PROT 7.2  --    ALBUMIN 3.7  --    BILITOT 0.5  --    ALKPHOS 76  --    AST 19  --    ALT 8*  --    ANIONGAP 20* 14       Significant Imaging: I have reviewed all pertinent imaging results/findings within the past 24 hours.

## 2022-11-07 NOTE — PLAN OF CARE
[facility] - Telemetry (Hospital)  Discharge Assessment    Primary Care Provider: Elyo Hdez MD     Discharge Assessment (most recent)       BRIEF DISCHARGE ASSESSMENT - 11/07/22 161          Discharge Planning    Assessment Type Discharge Planning Brief Assessment     Resource/Environmental Concerns none     Support Systems Spouse/significant other     Equipment Currently Used at Home bedside commode;nebulizer;oxygen;walker, rolling;cane, straight     Current Living Arrangements home/apartment/condo     Patient/Family Anticipates Transition to home with help/services     Patient/Family Anticipated Services at Transition home health care     DME Needed Upon Discharge  none     Discharge Plan A Home Health

## 2022-11-07 NOTE — PLAN OF CARE
Pt AAOx4. No signs of distress. Able to verbalize needs and follow commands. Calm and Cooperative. Denies any pain at this time. Vital signs stable. Sinus Rhythm on tele monitor. On 3L of O2 via NC. PIV is patent. Pt remains free of falls. Meds given as prescribed.  POC reviewed with pt and pt verbalized understanding. Pt educated on safety and fall risk. Verbalized understanding. Interventions implemented as appropriate. Pt able to turn self. Encouraged pt  to turn frequently. Resting quietly in bed. Bed low. Side rails up x2, wheels locked, call light within reach.

## 2022-11-07 NOTE — PROGRESS NOTES
[facility] - Telemetry ([facility])  [facility]  Progress Note    Patient Name: Christine Horner  MRN: [id number]  Patient Class: OP- Observation   Admission Date: 11/6/2022  Length of Stay: 0 days  Attending Physician: Ramon Tenorio, *  Primary Care Provider: Eloy Hdez MD        Subjective:     Principal Problem:Acute on chronic respiratory failure with hypoxia        HPI:  61 y/o female with PMHx of COPD, chronic respiratory failure with hypoxia, thyroid carcinoma, post-surgical hypothyroidism, osteoporosis, CLL, and anemia who presented to the ED with c/o SOB that onset gradually over the past 24 hours. Pt uses home oxygen. Sx are constant and moderate in severity. No exacerbating or mitigating factors reported. Associated sx include productive cough with purulent sputum, wheezes, fatigue, and weakness. Pt denies fever, chills, CP, abd pain, N/V/D, and all other sx at this time.  ED workup shows: WBC 21K, Pulse 124, RR 24, O2 sat 94% on 3L oxygen via NC  She is a full code and her SDM is her spouse, Chu  She will be kept on OBS for acute on chronic respiratory failure with hypoxia under the care of hospital medicine.              Overview/Hospital Course:  Pt admitted to Observation for acute on chronic respiratory failure. Imaging is characteristic of PNA. COPD exacerbation also suspected due to negative procalcitonin. IV steroids, antibiotics, and nebulizer treatments continued. Leukocytosis noted with upward trend in the setting of steroid use. Supplemental oxygen in place at 3L NC. Pt verbalized symptom improvement on prescribed regime      Interval History: pt in bed upon exam and reports dyspnea and fatigue.  Supplemental oxygen with 3L NC in place. Pt verbalized symptom improvement with current plan of care continued as previously prescribed. Discharge to home anticipated soon.     Review of Systems   Constitutional:  Positive for fatigue. Negative for activity change, appetite change,  chills and fever.   HENT:  Negative for congestion, dental problem, ear pain, hearing loss, mouth sores, sinus pressure, sore throat, tinnitus and trouble swallowing.    Eyes:  Negative for pain, discharge, redness and visual disturbance.   Respiratory:  Positive for cough, shortness of breath and wheezing. Negative for apnea, choking and chest tightness.    Cardiovascular:  Negative for chest pain, palpitations and leg swelling.   Gastrointestinal:  Negative for abdominal distention, abdominal pain, anal bleeding, blood in stool, constipation, diarrhea, nausea, rectal pain and vomiting.   Endocrine: Negative for cold intolerance, heat intolerance, polydipsia and polyuria.   Genitourinary:  Negative for difficulty urinating, dysuria, flank pain, frequency, hematuria and urgency.   Musculoskeletal:  Negative for arthralgias, back pain, gait problem, joint swelling, myalgias, neck pain and neck stiffness.   Skin:  Negative for color change, rash and wound.   Allergic/Immunologic: Negative.    Neurological:  Positive for weakness. Negative for dizziness, tremors, seizures, syncope, speech difficulty, light-headedness and headaches.   Hematological: Negative.    Psychiatric/Behavioral:  Negative for agitation, behavioral problems, confusion and sleep disturbance. The patient is not nervous/anxious.    All other systems reviewed and are negative.  Objective:     Vital Signs (Most Recent):  Temp: 97.9 °F (36.6 °C) (11/07/22 1113)  Pulse: 89 (11/07/22 1113)  Resp: 18 (11/07/22 1113)  BP: 112/75 (11/07/22 1113)  SpO2: 97 % (11/07/22 1113) Vital Signs (24h Range):  Temp:  [97.7 °F (36.5 °C)-98.4 °F (36.9 °C)] 97.9 °F (36.6 °C)  Pulse:  [] 89  Resp:  [16-18] 18  SpO2:  [93 %-98 %] 97 %  BP: (112-147)/(65-80) 112/75     Weight: 59.4 kg (130 lb 15.3 oz)  Body mass index is 21.14 kg/m².    Intake/Output Summary (Last 24 hours) at 11/7/2022 1137  Last data filed at 11/7/2022 0800  Gross per 24 hour   Intake 180 ml   Output  --   Net 180 ml      Physical Exam  Vitals and nursing note reviewed.   Constitutional:       General: She is not in acute distress.     Appearance: She is well-developed. She is not diaphoretic.   HENT:      Head: Normocephalic and atraumatic.      Nose: Nose normal.   Eyes:      General: No scleral icterus.     Extraocular Movements: Extraocular movements intact.      Pupils: Pupils are equal, round, and reactive to light.   Neck:      Thyroid: No thyromegaly.      Vascular: No JVD.      Trachea: No tracheal deviation.   Cardiovascular:      Rate and Rhythm: Normal rate and regular rhythm.      Heart sounds: Normal heart sounds. No murmur heard.    No friction rub. No gallop.   Pulmonary:      Effort: Pulmonary effort is normal. No respiratory distress.      Breath sounds: Wheezing present. No rales.   Chest:      Chest wall: No tenderness.   Abdominal:      General: Bowel sounds are normal. There is no distension.      Palpations: Abdomen is soft. There is no mass.      Tenderness: There is no abdominal tenderness.   Genitourinary:     Comments: deferred  Musculoskeletal:         General: No tenderness or deformity. Normal range of motion.      Cervical back: Normal range of motion and neck supple.   Skin:     General: Skin is warm and dry.      Capillary Refill: Capillary refill takes 2 to 3 seconds.      Coloration: Skin is not pale.      Findings: No erythema or rash.   Neurological:      Mental Status: She is alert and oriented to person, place, and time. Mental status is at baseline.      Cranial Nerves: No cranial nerve deficit.      Motor: No abnormal muscle tone.      Coordination: Coordination normal.   Psychiatric:         Mood and Affect: Mood normal.         Behavior: Behavior normal.       Significant Labs: All pertinent labs within the past 24 hours have been reviewed.  CBC:   Recent Labs   Lab 11/06/22  0815 11/07/22  0459   WBC 21.81* 23.61*   HGB 11.5* 9.6*   HCT 36.3* 30.4*    332      CMP:   Recent Labs   Lab 11/06/22  0815 11/07/22  0459    139   K 3.6 4.0   CL 95 95   CO2 27 30*   * 131*   BUN 9 11   CREATININE 0.7 0.7   CALCIUM 8.3* 8.0*   PROT 7.2  --    ALBUMIN 3.7  --    BILITOT 0.5  --    ALKPHOS 76  --    AST 19  --    ALT 8*  --    ANIONGAP 20* 14       Significant Imaging: I have reviewed all pertinent imaging results/findings within the past 24 hours.      Assessment/Plan:      * Acute on chronic respiratory failure with hypoxia  --uses home oxygen  --duo nebs, brovana, budesonide  --IV steroids  --IV azithromycin given in ED X 1  --PO doxycycline      Acute cystitis without hematuria  --WBC 21K, U/A shows + nitrites  --empiric abx      COPD with acute exacerbation  --uses home oxygen, continue supplemental oxygen to maintain sats  --IV steroids  --Duonebs brovana, budesonide  --IV azithromycin given in ED X1  --PO doxycycline  --imaging consistent with PNA and Procalcitonin negative        VTE Risk Mitigation (From admission, onward)           Ordered     IP VTE HIGH RISK PATIENT  Once         11/06/22 1038     Place sequential compression device  Until discontinued         11/06/22 1038                    Discharge Planning   MARK:      Code Status: Full Code   Is the patient medically ready for discharge?:     Reason for patient still in hospital (select all that apply): Patient trending condition, Laboratory test and Treatment                     Mallory Talbert NP  Department of Hospital Medicine   [facility] - Telemetry ([facility])

## 2022-11-08 VITALS
HEART RATE: 71 BPM | HEIGHT: 66 IN | WEIGHT: 131.19 LBS | RESPIRATION RATE: 18 BRPM | DIASTOLIC BLOOD PRESSURE: 79 MMHG | SYSTOLIC BLOOD PRESSURE: 135 MMHG | OXYGEN SATURATION: 98 % | TEMPERATURE: 97 F | BODY MASS INDEX: 21.08 KG/M2

## 2022-11-08 LAB
ANION GAP SERPL CALC-SCNC: 12 MMOL/L (ref 8–16)
ANISOCYTOSIS BLD QL SMEAR: SLIGHT
BASOPHILS NFR BLD: 0 % (ref 0–1.9)
BUN SERPL-MCNC: 17 MG/DL (ref 8–23)
CALCIUM SERPL-MCNC: 7.7 MG/DL (ref 8.7–10.5)
CHLORIDE SERPL-SCNC: 96 MMOL/L (ref 95–110)
CO2 SERPL-SCNC: 32 MMOL/L (ref 23–29)
CREAT SERPL-MCNC: 0.7 MG/DL (ref 0.5–1.4)
DIFFERENTIAL METHOD: ABNORMAL
EOSINOPHIL NFR BLD: 0 % (ref 0–8)
ERYTHROCYTE [DISTWIDTH] IN BLOOD BY AUTOMATED COUNT: 13.8 % (ref 11.5–14.5)
EST. GFR  (NO RACE VARIABLE): >60 ML/MIN/1.73 M^2
GLUCOSE SERPL-MCNC: 155 MG/DL (ref 70–110)
HCT VFR BLD AUTO: 32.1 % (ref 37–48.5)
HGB BLD-MCNC: 10 G/DL (ref 12–16)
IMM GRANULOCYTES # BLD AUTO: ABNORMAL K/UL (ref 0–0.04)
IMM GRANULOCYTES NFR BLD AUTO: ABNORMAL % (ref 0–0.5)
LYMPHOCYTES NFR BLD: 51 % (ref 18–48)
MCH RBC QN AUTO: 28.2 PG (ref 27–31)
MCHC RBC AUTO-ENTMCNC: 31.2 G/DL (ref 32–36)
MCV RBC AUTO: 91 FL (ref 82–98)
MONOCYTES NFR BLD: 1 % (ref 4–15)
NEUTROPHILS NFR BLD: 48 % (ref 38–73)
NRBC BLD-RTO: 0 /100 WBC
OVALOCYTES BLD QL SMEAR: ABNORMAL
PLATELET # BLD AUTO: 361 K/UL (ref 150–450)
PLATELET BLD QL SMEAR: ABNORMAL
PMV BLD AUTO: 10.7 FL (ref 9.2–12.9)
POIKILOCYTOSIS BLD QL SMEAR: SLIGHT
POLYCHROMASIA BLD QL SMEAR: ABNORMAL
POTASSIUM SERPL-SCNC: 3.7 MMOL/L (ref 3.5–5.1)
RBC # BLD AUTO: 3.54 M/UL (ref 4–5.4)
SODIUM SERPL-SCNC: 140 MMOL/L (ref 136–145)
WBC # BLD AUTO: 30.16 K/UL (ref 3.9–12.7)

## 2022-11-08 PROCEDURE — 36415 COLL VENOUS BLD VENIPUNCTURE: CPT | Performed by: NURSE PRACTITIONER

## 2022-11-08 PROCEDURE — 85027 COMPLETE CBC AUTOMATED: CPT | Performed by: NURSE PRACTITIONER

## 2022-11-08 PROCEDURE — 25000003 PHARM REV CODE 250: Performed by: NURSE PRACTITIONER

## 2022-11-08 PROCEDURE — 94799 UNLISTED PULMONARY SVC/PX: CPT

## 2022-11-08 PROCEDURE — 80048 BASIC METABOLIC PNL TOTAL CA: CPT | Performed by: NURSE PRACTITIONER

## 2022-11-08 PROCEDURE — 25000003 PHARM REV CODE 250: Performed by: STUDENT IN AN ORGANIZED HEALTH CARE EDUCATION/TRAINING PROGRAM

## 2022-11-08 PROCEDURE — 85007 BL SMEAR W/DIFF WBC COUNT: CPT | Performed by: NURSE PRACTITIONER

## 2022-11-08 PROCEDURE — 99900035 HC TECH TIME PER 15 MIN (STAT)

## 2022-11-08 PROCEDURE — 63600175 PHARM REV CODE 636 W HCPCS: Performed by: NURSE PRACTITIONER

## 2022-11-08 RX ORDER — GUAIFENESIN 600 MG/1
600 TABLET, EXTENDED RELEASE ORAL 2 TIMES DAILY
Status: DISCONTINUED | OUTPATIENT
Start: 2022-11-08 | End: 2022-11-08 | Stop reason: HOSPADM

## 2022-11-08 RX ORDER — ONDANSETRON 4 MG/1
4 TABLET, ORALLY DISINTEGRATING ORAL EVERY 12 HOURS PRN
Qty: 4 TABLET | Refills: 0 | Status: SHIPPED | OUTPATIENT
Start: 2022-11-08 | End: 2022-11-10

## 2022-11-08 RX ORDER — ONDANSETRON 4 MG/1
4 TABLET, FILM COATED ORAL EVERY 6 HOURS PRN
Status: DISCONTINUED | OUTPATIENT
Start: 2022-11-08 | End: 2022-11-08 | Stop reason: HOSPADM

## 2022-11-08 RX ORDER — GUAIFENESIN 600 MG/1
600 TABLET, EXTENDED RELEASE ORAL 2 TIMES DAILY
Qty: 6 TABLET | Refills: 0 | Status: SHIPPED | OUTPATIENT
Start: 2022-11-08 | End: 2022-11-11

## 2022-11-08 RX ORDER — DOXYCYCLINE HYCLATE 100 MG
100 TABLET ORAL EVERY 12 HOURS
Qty: 6 TABLET | Refills: 0 | Status: SHIPPED | OUTPATIENT
Start: 2022-11-08 | End: 2022-11-11

## 2022-11-08 RX ORDER — PREDNISONE 20 MG/1
40 TABLET ORAL DAILY
Qty: 8 TABLET | Refills: 0 | Status: SHIPPED | OUTPATIENT
Start: 2022-11-08 | End: 2022-11-12

## 2022-11-08 RX ORDER — ONDANSETRON 2 MG/ML
4 INJECTION INTRAMUSCULAR; INTRAVENOUS
Status: CANCELLED | OUTPATIENT
Start: 2022-11-08

## 2022-11-08 RX ADMIN — METHYLPREDNISOLONE SODIUM SUCCINATE 80 MG: 40 INJECTION, POWDER, FOR SOLUTION INTRAMUSCULAR; INTRAVENOUS at 12:11

## 2022-11-08 RX ADMIN — METHYLPREDNISOLONE SODIUM SUCCINATE 80 MG: 40 INJECTION, POWDER, FOR SOLUTION INTRAMUSCULAR; INTRAVENOUS at 05:11

## 2022-11-08 RX ADMIN — BUPROPION HYDROCHLORIDE 150 MG: 150 TABLET, FILM COATED, EXTENDED RELEASE ORAL at 08:11

## 2022-11-08 RX ADMIN — OXYCODONE AND ACETAMINOPHEN 1 TABLET: 10; 325 TABLET ORAL at 11:11

## 2022-11-08 RX ADMIN — GABAPENTIN 400 MG: 400 CAPSULE ORAL at 02:11

## 2022-11-08 RX ADMIN — OXYCODONE AND ACETAMINOPHEN 1 TABLET: 10; 325 TABLET ORAL at 05:11

## 2022-11-08 RX ADMIN — GUAIFENESIN 600 MG: 600 TABLET, EXTENDED RELEASE ORAL at 11:11

## 2022-11-08 RX ADMIN — TIZANIDINE 4 MG: 4 TABLET ORAL at 02:11

## 2022-11-08 RX ADMIN — FLUTICASONE PROPIONATE 100 MCG: 50 SPRAY, METERED NASAL at 08:11

## 2022-11-08 RX ADMIN — ALPRAZOLAM 0.5 MG: 0.5 TABLET ORAL at 06:11

## 2022-11-08 RX ADMIN — CETIRIZINE HYDROCHLORIDE 10 MG: 10 TABLET, FILM COATED ORAL at 08:11

## 2022-11-08 RX ADMIN — ALPRAZOLAM 0.5 MG: 0.5 TABLET ORAL at 02:11

## 2022-11-08 RX ADMIN — GABAPENTIN 400 MG: 400 CAPSULE ORAL at 08:11

## 2022-11-08 RX ADMIN — ONDANSETRON HYDROCHLORIDE 4 MG: 4 TABLET, FILM COATED ORAL at 08:11

## 2022-11-08 RX ADMIN — TIZANIDINE 4 MG: 4 TABLET ORAL at 08:11

## 2022-11-08 RX ADMIN — CLONAZEPAM 0.5 MG: 0.5 TABLET ORAL at 08:11

## 2022-11-08 RX ADMIN — METHYLPREDNISOLONE SODIUM SUCCINATE 80 MG: 40 INJECTION, POWDER, FOR SOLUTION INTRAMUSCULAR; INTRAVENOUS at 11:11

## 2022-11-08 RX ADMIN — GABAPENTIN 400 MG: 400 CAPSULE ORAL at 05:11

## 2022-11-08 RX ADMIN — FAMOTIDINE 20 MG: 20 TABLET ORAL at 08:11

## 2022-11-08 RX ADMIN — OXYCODONE AND ACETAMINOPHEN 1 TABLET: 10; 325 TABLET ORAL at 04:11

## 2022-11-08 RX ADMIN — DOXYCYCLINE HYCLATE 100 MG: 100 TABLET, COATED ORAL at 08:11

## 2022-11-08 NOTE — DISCHARGE SUMMARY
[facility] - Telemetry ([facility])  [facility] Medicine  Discharge Summary      Patient Name: Christine Horner  MRN: [id number]  JULIANNE: [id number]  Patient Class: IP- Inpatient  Admission Date: 11/6/2022  Hospital Length of Stay: 1 days  Discharge Date and Time: No discharge date for patient encounter.  Attending Physician: Chantal Aguillon, *   Discharging Provider: Chantal Aguillon MD  Primary Care Provider: Eloy Hdez MD    Primary Care Team: Networked reference to record PCT     HPI:   63 y/o female with PMHx of COPD, chronic respiratory failure with hypoxia, thyroid carcinoma, post-surgical hypothyroidism, osteoporosis, CLL, and anemia who presented to the ED with c/o SOB that onset gradually over the past 24 hours. Pt uses home oxygen. Sx are constant and moderate in severity. No exacerbating or mitigating factors reported. Associated sx include productive cough with purulent sputum, wheezes, fatigue, and weakness. Pt denies fever, chills, CP, abd pain, N/V/D, and all other sx at this time.  ED workup shows: WBC 21K, Pulse 124, RR 24, O2 sat 94% on 3L oxygen via NC  She is a full code and her SDM is her spouse, Chu  She will be kept on OBS for acute on chronic respiratory failure with hypoxia under the care of hospital medicine.              * No surgery found *      Hospital Course:   Pt admitted to Observation for acute on chronic respiratory failure. Imaging is characteristic of PNA. COPD exacerbation also suspected due to negative procalcitonin. IV steroids, antibiotics, and nebulizer treatments continued. Leukocytosis noted with upward trend in the setting of steroid use. Supplemental oxygen in place at 3L NC. Pt verbalized symptom improvement on prescribed regimen    11/8:    Examination done bedside, patient stated improvement in shortness of breath, denied any acute issues.    Able to ambulate with no issues.    Hemodynamically stable, currently on 3 L nasal cannula saturating about 92,  at baseline oxygen requirement.    Labs reviewed.    Ordered mucolytics, chest physiotherapy.    Home oxygen evaluation and follow-up with  for home oxygen delivery at bedside.    PT OT evaluated and recommended home health-f/u with ;   Considering clinical and hemodynamic stability planning to discharge patient today.  Patient agreed to the plan.            Review of Systems   Constitutional:  Negative for activity change, appetite change, chills and fever.   HENT:  Negative for congestion, dental problem, ear pain, hearing loss, mouth sores, sinus pressure, sore throat, tinnitus and trouble swallowing.    Eyes:  Negative for pain, discharge, redness and visual disturbance.   Respiratory:   Negative for apnea, choking and chest tightness.    Cardiovascular:  Negative for chest pain, palpitations and leg swelling.   Gastrointestinal:  Negative for abdominal distention, abdominal pain, anal bleeding, blood in stool, constipation, diarrhea, nausea, rectal pain and vomiting.   Endocrine: Negative for cold intolerance, heat intolerance, polydipsia and polyuria.   Genitourinary:  Negative for difficulty urinating, dysuria, flank pain, frequency, hematuria and urgency.   Musculoskeletal:  Negative for arthralgias, back pain, gait problem, joint swelling, myalgias, neck pain and neck stiffness.   Skin:  Negative for color change, rash and wound.   Allergic/Immunologic: Negative.    Neurological:  Negative for dizziness, tremors, seizures, syncope, speech difficulty, light-headedness and headaches.   Hematological: Negative.    Psychiatric/Behavioral:  Negative for agitation, behavioral problems, confusion and sleep disturbance. The patient is not nervous/anxious.    All other systems reviewed and are negative.      Physical Exam  Vitals and nursing note reviewed.   Constitutional:       General: She is not in acute distress.     Appearance: She is well-developed. She is not diaphoretic.   HENT:       Head: Normocephalic and atraumatic.      Nose: Nose normal.   Eyes:      General: No scleral icterus.     Extraocular Movements: Extraocular movements intact.      Pupils: Pupils are equal, round, and reactive to light.   Neck:      Thyroid: No thyromegaly.      Vascular: No JVD.      Trachea: No tracheal deviation.   Cardiovascular:      Rate and Rhythm: Normal rate and regular rhythm.      Heart sounds: Normal heart sounds. No murmur heard.    No friction rub. No gallop.   Pulmonary:      Effort: Pulmonary effort is normal. No respiratory distress.      Breath sounds:  No rales.   Chest:      Chest wall: No tenderness.   Abdominal:      General: Bowel sounds are normal. There is no distension.      Palpations: Abdomen is soft. There is no mass.      Tenderness: There is no abdominal tenderness.   Genitourinary:     Comments: deferred  Musculoskeletal:         General: No tenderness or deformity. Normal range of motion.      Cervical back: Normal range of motion and neck supple.   Skin:     General: Skin is warm and dry.      Capillary Refill: Capillary refill takes 2 to 3 seconds.      Coloration: Skin is not pale.      Findings: No erythema or rash.   Neurological:      Mental Status: She is alert and oriented to person, place, and time. Mental status is at baseline.      Cranial Nerves: No cranial nerve deficit.      Motor: No abnormal muscle tone.      Coordination: Coordination normal.   Psychiatric:         Mood and Affect: Mood normal.         Behavior: Behavior normal.     Goals of Care Treatment Preferences:  Code Status: Full Code      Consults:     No new Assessment & Plan notes have been filed under this hospital service since the last note was generated.  Service: Hospital Medicine    Final Active Diagnoses:    Diagnosis Date Noted POA    PRINCIPAL PROBLEM:  Acute on chronic respiratory failure with hypoxia [J96.21] 04/24/2016 Unknown    Acute cystitis without hematuria [N30.00] 11/06/2022 Yes     COPD with acute exacerbation [J44.1] 04/24/2016 Unknown      Problems Resolved During this Admission:       Discharged Condition: stable    Disposition: Home or Self Care    Follow Up:   Follow-up Information     [facility] Follow up.    Specialties: Home Health Services, Home Therapy Services  Contact information:  [address]  [place] [facility] [address]  [phone]           [facility] Follow up.    Why: Home O2  Contact information:  [address]  [address]  [place] [address]  [phone]           Eloy Hdez MD Follow up in 1 week(s).    Specialty: Family Medicine  Contact information:  [address] [facility] [address]  [phone]                       Patient Instructions:      Diet Adult Regular     Activity as tolerated       Significant Diagnostic Studies:     Results for orders placed or performed during the hospital encounter of 11/06/22   Blood culture    Specimen: Peripheral, Antecubital, Left; Blood   Result Value Ref Range    Blood Culture, Routine No Growth to date     Blood Culture, Routine No Growth to date    Blood culture    Specimen: Peripheral, Hand, Left; Blood   Result Value Ref Range    Blood Culture, Routine No Growth to date     Blood Culture, Routine No Growth to date    Culture, Respiratory with Gram Stain    Specimen: Sputum; Respiratory   Result Value Ref Range    Respiratory Culture Normal respiratory yamini     Gram Stain (Respiratory) <10 epithelial cells per low power field.     Gram Stain (Respiratory) Rare WBC's     Gram Stain (Respiratory) Few Gram positive cocci     Gram Stain (Respiratory) Rare Gram negative rods    Comprehensive Metabolic Panel   Result Value Ref Range    Sodium 142 136 - 145 mmol/L    Potassium 3.6 3.5 - 5.1 mmol/L    Chloride 95 95 - 110 mmol/L    CO2 27 23 - 29 mmol/L    Glucose 125 (H) 70 - 110 mg/dL    BUN 9 8 - 23 mg/dL    Creatinine 0.7 0.5 - 1.4  mg/dL    Calcium 8.3 (L) 8.7 - 10.5 mg/dL    Total Protein 7.2 6.0 - 8.4 g/dL    Albumin 3.7 3.5 - 5.2 g/dL    Total Bilirubin 0.5 0.1 - 1.0 mg/dL    Alkaline Phosphatase 76 55 - 135 U/L    AST 19 10 - 40 U/L    ALT 8 (L) 10 - 44 U/L    Anion Gap 20 (H) 8 - 16 mmol/L    eGFR >60 >60 mL/min/1.73 m^2   CBC Auto Differential   Result Value Ref Range    WBC 21.81 (H) 3.90 - 12.70 K/uL    RBC 4.02 4.00 - 5.40 M/uL    Hemoglobin 11.5 (L) 12.0 - 16.0 g/dL    Hematocrit 36.3 (L) 37.0 - 48.5 %    MCV 90 82 - 98 fL    MCH 28.6 27.0 - 31.0 pg    MCHC 31.7 (L) 32.0 - 36.0 g/dL    RDW 13.8 11.5 - 14.5 %    Platelets 319 150 - 450 K/uL    MPV 10.5 9.2 - 12.9 fL    Immature Granulocytes CANCELED 0.0 - 0.5 %    Immature Grans (Abs) CANCELED 0.00 - 0.04 K/uL    nRBC 0 0 /100 WBC    Gran % 0.0 (L) 38.0 - 73.0 %    Lymph % 72.0 (H) 18.0 - 48.0 %    Mono % 24.0 (H) 4.0 - 15.0 %    Eosinophil % 1.0 0.0 - 8.0 %    Basophil % 0.0 0.0 - 1.9 %    Myelocytes 3.0 %    Platelet Estimate Appears normal     Aniso Slight     Poik Slight     Hypo Occasional     Ovalocytes Occasional     Acanthocytes Present     Smudge Cells Present     Large/Giant Platelets Present     Differential Method Manual    Urinalysis, Reflex to Urine Culture Urine, Clean Catch    Specimen: Urine   Result Value Ref Range    Specimen UA Urine, Clean Catch     Color, UA Yellow Yellow, Straw, Jeannine    Appearance, UA Clear Clear    pH, UA 6.0 5.0 - 8.0    Specific Gravity, UA 1.015 1.005 - 1.030    Protein, UA Trace (A) Negative    Glucose, UA Negative Negative    Ketones, UA 3+ (A) Negative    Bilirubin (UA) 1+ (A) Negative    Occult Blood UA Negative Negative    Nitrite, UA Positive (A) Negative    Urobilinogen, UA 2.0-3.0 (A) <2.0 EU/dL    Leukocytes, UA Negative Negative   BNP   Result Value Ref Range    BNP 90 0 - 99 pg/mL   CK   Result Value Ref Range    CPK 54 20 - 180 U/L   Troponin I   Result Value Ref Range    Troponin I <0.006 0.000 - 0.026 ng/mL   Lactic acid,  plasma   Result Value Ref Range    Lactate (Lactic Acid) 1.8 0.5 - 2.2 mmol/L   Procalcitonin   Result Value Ref Range    Procalcitonin 0.02 <0.25 ng/mL   Urinalysis Microscopic   Result Value Ref Range    RBC, UA 1 0 - 4 /hpf    Bacteria None None-Occ /hpf    Microscopic Comment SEE COMMENT    CBC auto differential   Result Value Ref Range    WBC 23.61 (H) 3.90 - 12.70 K/uL    RBC 3.43 (L) 4.00 - 5.40 M/uL    Hemoglobin 9.6 (L) 12.0 - 16.0 g/dL    Hematocrit 30.4 (L) 37.0 - 48.5 %    MCV 89 82 - 98 fL    MCH 28.0 27.0 - 31.0 pg    MCHC 31.6 (L) 32.0 - 36.0 g/dL    RDW 13.6 11.5 - 14.5 %    Platelets 332 150 - 450 K/uL    MPV 10.4 9.2 - 12.9 fL    Immature Granulocytes 0.3 0.0 - 0.5 %    Gran # (ANC) 5.0 1.8 - 7.7 K/uL    Immature Grans (Abs) 0.08 (H) 0.00 - 0.04 K/uL    Lymph # 17.9 (H) 1.0 - 4.8 K/uL    Mono # 0.7 0.3 - 1.0 K/uL    Eos # 0.0 0.0 - 0.5 K/uL    Baso # 0.02 0.00 - 0.20 K/uL    nRBC 0 0 /100 WBC    Gran % 21.2 (L) 38.0 - 73.0 %    Lymph % 75.6 (H) 18.0 - 48.0 %    Mono % 2.8 (L) 4.0 - 15.0 %    Eosinophil % 0.0 0.0 - 8.0 %    Basophil % 0.1 0.0 - 1.9 %    Platelet Estimate Appears normal     Aniso Slight     Poik Slight     Hypo Occasional     Ovalocytes Occasional     Differential Method Automated    Basic metabolic panel   Result Value Ref Range    Sodium 139 136 - 145 mmol/L    Potassium 4.0 3.5 - 5.1 mmol/L    Chloride 95 95 - 110 mmol/L    CO2 30 (H) 23 - 29 mmol/L    Glucose 131 (H) 70 - 110 mg/dL    BUN 11 8 - 23 mg/dL    Creatinine 0.7 0.5 - 1.4 mg/dL    Calcium 8.0 (L) 8.7 - 10.5 mg/dL    Anion Gap 14 8 - 16 mmol/L    eGFR >60 >60 mL/min/1.73 m^2   Pathologist Review   Result Value Ref Range    Pathologist Review Peripheral Smear REVIEWED    Pathologist Review   Result Value Ref Range    Pathologist Review Peripheral Smear REVIEWED    CBC auto differential   Result Value Ref Range    WBC 30.16 (H) 3.90 - 12.70 K/uL    RBC 3.54 (L) 4.00 - 5.40 M/uL    Hemoglobin 10.0 (L) 12.0 - 16.0 g/dL     Hematocrit 32.1 (L) 37.0 - 48.5 %    MCV 91 82 - 98 fL    MCH 28.2 27.0 - 31.0 pg    MCHC 31.2 (L) 32.0 - 36.0 g/dL    RDW 13.8 11.5 - 14.5 %    Platelets 361 150 - 450 K/uL    MPV 10.7 9.2 - 12.9 fL    Immature Granulocytes CANCELED 0.0 - 0.5 %    Immature Grans (Abs) CANCELED 0.00 - 0.04 K/uL    nRBC 0 0 /100 WBC    Gran % 48.0 38.0 - 73.0 %    Lymph % 51.0 (H) 18.0 - 48.0 %    Mono % 1.0 (L) 4.0 - 15.0 %    Eosinophil % 0.0 0.0 - 8.0 %    Basophil % 0.0 0.0 - 1.9 %    Platelet Estimate Appears normal     Aniso Slight     Poik Slight     Poly Occasional     Ovalocytes Occasional     Differential Method Manual    Basic metabolic panel   Result Value Ref Range    Sodium 140 136 - 145 mmol/L    Potassium 3.7 3.5 - 5.1 mmol/L    Chloride 96 95 - 110 mmol/L    CO2 32 (H) 23 - 29 mmol/L    Glucose 155 (H) 70 - 110 mg/dL    BUN 17 8 - 23 mg/dL    Creatinine 0.7 0.5 - 1.4 mg/dL    Calcium 7.7 (L) 8.7 - 10.5 mg/dL    Anion Gap 12 8 - 16 mmol/L    eGFR >60 >60 mL/min/1.73 m^2   POCT COVID-19 Rapid Screening   Result Value Ref Range    POC Rapid COVID Negative Negative     Acceptable Yes    POCT Influenza A/B Molecular   Result Value Ref Range    POC Molecular Influenza A Ag Negative Negative, Not Reported    POC Molecular Influenza B Ag Negative Negative, Not Reported     Acceptable Yes    ISTAT PROCEDURE   Result Value Ref Range    POC PH 7.458 (H) 7.35 - 7.45    POC PCO2 45.0 35 - 45 mmHg    POC PO2 76 (L) 80 - 100 mmHg    POC HCO3 31.8 (H) 24 - 28 mmol/L    POC BE 8 -2 to 2 mmol/L    POC SATURATED O2 96 95 - 100 %    Sample ARTERIAL     Site LR     Allens Test Pass     DelSys Nasal Can     Mode SPONT     Flow 3        Pending Diagnostic Studies:     None           Imaging Results          X-Ray Chest AP Portable (Final result)  Result time 11/06/22 08:23:30    Final result by ELIECER Renteria Sr., MD (11/06/22 08:23:30)                 Impression:      1. There is a mild amount of haziness  scattered throughout both lungs.  This is characteristic of pneumonia.  2. The right costophrenic angle is blunted.  This is characteristic of a tiny pleural effusion.  3. There are healed fractures on both sides of the thoracic cage.  .      Electronically signed by: Sunny Renteria MD  Date:    11/06/2022  Time:    08:23             Narrative:    EXAMINATION:  XR CHEST AP PORTABLE    CLINICAL HISTORY:  sob;    COMPARISON:  05/03/2021    FINDINGS:  The size of the heart is normal.  There is a mild amount of haziness scattered throughout both lungs.  There is no pneumothorax.  The left costophrenic angle is sharp.  The right costophrenic angle is blunted.  There are healed fractures on both sides of the thoracic cage.                              Medications:         Medication List      START taking these medications    doxycycline 100 MG tablet  Commonly known as: VIBRA-TABS  Take 1 tablet (100 mg total) by mouth every 12 (twelve) hours. for 3 days     guaiFENesin 600 mg 12 hr tablet  Commonly known as: MUCINEX  Take 1 tablet (600 mg total) by mouth 2 (two) times daily. for 3 days     predniSONE 20 MG tablet  Commonly known as: DELTASONE  Take 2 tablets (40 mg total) by mouth once daily. for 4 days        CONTINUE taking these medications    ADVAIR DISKUS 250-50 mcg/dose diskus inhaler  Generic drug: fluticasone-salmeterol 250-50 mcg/dose  USE 1 INHALATION TWICE DAILY **THANK YOU**     * albuterol 2.5 mg /3 mL (0.083 %) nebulizer solution  Commonly known as: PROVENTIL  INHALE 1 VIAL VIA NEBULIZER EVERY 4 HOURS AS NEEDED **THANK YOU**     * albuterol 90 mcg/actuation inhaler  Commonly known as: PROAIR HFA  INHALE TWO PUFFS EVERY 4 TO 6 HOURS **THANK YOU**     buPROPion 150 MG TB24 tablet  Commonly known as: WELLBUTRIN XL     busPIRone 7.5 MG tablet  Commonly known as: BUSPAR     calcitRIOL 0.25 MCG Cap  Commonly known as: ROCALTROL     diltiaZEM 120 MG tablet  Commonly known as: CARDIZEM     fluticasone propionate  50 mcg/actuation nasal spray  Commonly known as: FLONASE  2 sprays by Each Nare route 2 (two) times daily.     gabapentin 400 MG capsule  Commonly known as: NEURONTIN     levothyroxine 125 MCG tablet  Commonly known as: SYNTHROID     montelukast 10 mg tablet  Commonly known as: SINGULAIR     roflumilast 500 mcg Tab  Commonly known as: DALIRESP  TAKE ONE TABLET BY MOUTH EVERY DAY **THANK YOU**     tiotropium 18 mcg inhalation capsule  Commonly known as: SPIRIVA     tiotropium-olodateroL 2.5-2.5 mcg/actuation Mist  Commonly known as: STIOLTO RESPIMAT  Inhale 2 puffs into the lungs once daily.     tiZANidine 4 MG tablet  Commonly known as: ZANAFLEX     zolpidem 5 MG Tab  Commonly known as: AMBIEN         * This list has 2 medication(s) that are the same as other medications prescribed for you. Read the directions carefully, and ask your doctor or other care provider to review them with you.            STOP taking these medications    clonazePAM 0.5 MG tablet  Commonly known as: KlonoPIN     docusate sodium 100 MG capsule  Commonly known as: COLACE     fluconazole 200 MG Tab  Commonly known as: DIFLUCAN     furosemide 20 MG tablet  Commonly known as: LASIX     levocetirizine 5 MG tablet  Commonly known as: XYZAL     morphine 50 MG 24 hr capsule  Commonly known as: TAMI     oxyCODONE-acetaminophen 7.5-325 mg per tablet  Commonly known as: PERCOCET           Where to Get Your Medications      These medications were sent to [facility] [address] [place] [address]    Hours: Mon-Fri, 8a-5:30p Phone: [phone]   · doxycycline 100 MG tablet  · guaiFENesin 600 mg 12 hr tablet  · predniSONE 20 MG tablet         Indwelling Lines/Drains at time of discharge:   Lines/Drains/Airways     None                 Time spent on the discharge of patient: 59 minutes         Chantal Aguillon MD  Department of Hospital Medicine  [facility] - Telemetry ([facility]

## 2022-11-08 NOTE — DISCHARGE INSTRUCTIONS
Recommended patient to be compliant with medications.    Recommended patient to be compliant with home oxygen   Recommended patient to be compliant with follow-up visits.

## 2022-11-08 NOTE — PLAN OF CARE
Discussed home health with patient. Patient agreeable to referral being sent to [facility].

## 2022-11-08 NOTE — PLAN OF CARE
11/08/22 1117   Post-Acute Status   Post-Acute Authorization Home Health   Home Health Status Set-up Complete/Auth obtained   Hospital Resources/Appts/Education Provided Post-Acute resouces added to AVS [facility] accepted patient

## 2022-11-08 NOTE — PROGRESS NOTES
Home Oxygen Evaluation - [facility] - Cardiopulmonary Department      Date Performed: 2022      1) Patient's Home O2 Sat on room air, while at rest: Room Air SpO2 At Rest: (!) 87 %        If O2 sats on room air at rest are 88% or below, patient qualifies.  Document O2 liter flow needed in Step 2.  If O2 sats are 89% or above, complete Step 3.        2)  If patient is not ambulated and O2 sats are <88%, what is the O2 liter flow required to meet ordered saturation?      If O2 sats on room air while exercising remain 89% or above patient does not qualify, no further testing needed Document N/A in step 3. If O2 sats on room air while exercising are 88% or below, continue to Step 4.    3) Patient's O2 Sat on room air while exercisin) Patient's O2 Sat while exercising on O2: SpO2 During Ambulation on O2: 93 % at Ambulation O2 LPM: 3 LPM         (Must show improvement from #4 for patients to qualify)            Home Oxygen Evaluation - [facility] - Cardiopulmonary Department      Date Performed: 2022      1) Patient's Home O2 Sat on room air, while at rest: Room Air SpO2 At Rest: (!) 87 %        If O2 sats on room air at rest are 88% or below, patient qualifies.  Document O2 liter flow needed in Step 2.  If O2 sats are 89% or above, complete Step 3.        2)  If patient is not ambulated and O2 sats are <88%, what is the O2 liter flow required to meet ordered saturation?      If O2 sats on room air while exercising remain 89% or above patient does not qualify, no further testing needed Document N/A in step 3. If O2 sats on room air while exercising are 88% or below, continue to Step 4.    3) Patient's O2 Sat on room air while exercisin) Patient's O2 Sat while exercising on O2: SpO2 During Ambulation on O2: 93 % at Ambulation O2 LPM: 3 LPM         (Must show improvement from #4 for patients to qualify)

## 2022-11-08 NOTE — CONSULTS
Patient is current with [facility] for home O2. Faxed new home O2 order and clinicals to [facility].

## 2022-11-08 NOTE — PLAN OF CARE
FRANCISCO. Patient rested overnight. Patient wearing O2, no complaints of SOB. Patient reports feeling better. Encouraged deep breath and cough frequently. Continue current plan of care.   Problem: Adult Inpatient Plan of Care  Goal: Plan of Care Review  Outcome: Ongoing, Progressing  Goal: Patient-Specific Goal (Individualized)  Outcome: Ongoing, Progressing  Goal: Absence of Hospital-Acquired Illness or Injury  Outcome: Ongoing, Progressing  Goal: Optimal Comfort and Wellbeing  Outcome: Ongoing, Progressing  Goal: Readiness for Transition of Care  Outcome: Ongoing, Progressing     Problem: Fluid Imbalance (Pneumonia)  Goal: Fluid Balance  Outcome: Ongoing, Progressing     Problem: Infection (Pneumonia)  Goal: Resolution of Infection Signs and Symptoms  Outcome: Ongoing, Progressing     Problem: Respiratory Compromise (Pneumonia)  Goal: Effective Oxygenation and Ventilation  Outcome: Ongoing, Progressing     Problem: Skin Injury Risk Increased  Goal: Skin Health and Integrity  Outcome: Ongoing, Progressing

## 2022-11-09 NOTE — PLAN OF CARE
Went over discharge instructions with patient.   Stressed importance of making and keeping all follow ups as well as making prescribed medication changes.   Prescriptions delivered to pt bedside via [facility] prior to discharge.  Patient verbalized understanding and has had all questions in regards to discharge answered to satisfaction.  IV removed without complications.  Telemetry box removed and returned to monitor tech.  Patient transported via wheelchair to personal transportation at main entrance.

## 2022-11-10 LAB
BACTERIA SPEC AEROBE CULT: ABNORMAL
BACTERIA SPEC AEROBE CULT: ABNORMAL
GRAM STN SPEC: ABNORMAL

## 2022-11-11 LAB
BACTERIA BLD CULT: NORMAL
BACTERIA BLD CULT: NORMAL

## 2022-11-21 ENCOUNTER — TELEPHONE (OUTPATIENT)
Dept: PULMONOLOGY | Facility: CLINIC | Age: 62
End: 2022-11-21
Payer: COMMERCIAL

## 2022-11-28 ENCOUNTER — DOCUMENT SCAN (OUTPATIENT)
Dept: HOME HEALTH SERVICES | Facility: HOSPITAL | Age: 62
End: 2022-11-28
Payer: COMMERCIAL

## 2022-11-30 ENCOUNTER — EXTERNAL HOME HEALTH (OUTPATIENT)
Dept: HOME HEALTH SERVICES | Facility: HOSPITAL | Age: 62
End: 2022-11-30
Payer: COMMERCIAL

## 2022-12-01 ENCOUNTER — DOCUMENT SCAN (OUTPATIENT)
Dept: HOME HEALTH SERVICES | Facility: HOSPITAL | Age: 62
End: 2022-12-01
Payer: COMMERCIAL

## 2023-02-13 ENCOUNTER — HOSPITAL ENCOUNTER (INPATIENT)
Facility: HOSPITAL | Age: 63
LOS: 3 days | Discharge: HOME HEALTH | DRG: 193 | End: 2023-02-16
Attending: EMERGENCY MEDICINE | Admitting: HOSPITALIST
Payer: COMMERCIAL

## 2023-02-13 DIAGNOSIS — R06.00 DYSPNEA: ICD-10-CM

## 2023-02-13 DIAGNOSIS — J18.9 PNEUMONIA DUE TO INFECTIOUS ORGANISM, UNSPECIFIED LATERALITY, UNSPECIFIED PART OF LUNG: Primary | ICD-10-CM

## 2023-02-13 DIAGNOSIS — R07.9 CHEST PAIN: ICD-10-CM

## 2023-02-13 DIAGNOSIS — D72.829 LEUKOCYTOSIS, UNSPECIFIED TYPE: ICD-10-CM

## 2023-02-13 DIAGNOSIS — Z85.6 PERSONAL HISTORY OF CLL (CHRONIC LYMPHOCYTIC LEUKEMIA): ICD-10-CM

## 2023-02-13 LAB
ALBUMIN SERPL BCP-MCNC: 3.3 G/DL (ref 3.5–5.2)
ALP SERPL-CCNC: 143 U/L (ref 55–135)
ALT SERPL W/O P-5'-P-CCNC: 44 U/L (ref 10–44)
ANION GAP SERPL CALC-SCNC: 28 MMOL/L (ref 8–16)
AST SERPL-CCNC: 66 U/L (ref 10–40)
BASOPHILS NFR BLD: 0 % (ref 0–1.9)
BILIRUB SERPL-MCNC: 0.4 MG/DL (ref 0.1–1)
BUN SERPL-MCNC: 12 MG/DL (ref 8–23)
BURR CELLS BLD QL SMEAR: ABNORMAL
CALCIUM SERPL-MCNC: 9.4 MG/DL (ref 8.7–10.5)
CHLORIDE SERPL-SCNC: 90 MMOL/L (ref 95–110)
CO2 SERPL-SCNC: 18 MMOL/L (ref 23–29)
CREAT SERPL-MCNC: 0.8 MG/DL (ref 0.5–1.4)
DIFFERENTIAL METHOD: ABNORMAL
EOSINOPHIL NFR BLD: 0 % (ref 0–8)
ERYTHROCYTE [DISTWIDTH] IN BLOOD BY AUTOMATED COUNT: 14.4 % (ref 11.5–14.5)
EST. GFR  (NO RACE VARIABLE): >60 ML/MIN/1.73 M^2
GLUCOSE SERPL-MCNC: 97 MG/DL (ref 70–110)
HCT VFR BLD AUTO: 37.2 % (ref 37–48.5)
HGB BLD-MCNC: 11.6 G/DL (ref 12–16)
IMM GRANULOCYTES # BLD AUTO: ABNORMAL K/UL (ref 0–0.04)
IMM GRANULOCYTES NFR BLD AUTO: ABNORMAL % (ref 0–0.5)
INFLUENZA A, MOLECULAR: NEGATIVE
INFLUENZA B, MOLECULAR: NEGATIVE
LACTATE SERPL-SCNC: 2.7 MMOL/L (ref 0.5–2.2)
LYMPHOCYTES NFR BLD: 40 % (ref 18–48)
MCH RBC QN AUTO: 26.3 PG (ref 27–31)
MCHC RBC AUTO-ENTMCNC: 31.2 G/DL (ref 32–36)
MCV RBC AUTO: 84 FL (ref 82–98)
MONOCYTES NFR BLD: 5 % (ref 4–15)
NEUTROPHILS NFR BLD: 55 % (ref 38–73)
NRBC BLD-RTO: 0 /100 WBC
OVALOCYTES BLD QL SMEAR: ABNORMAL
PLATELET # BLD AUTO: 794 K/UL (ref 150–450)
PLATELET BLD QL SMEAR: ABNORMAL
PMV BLD AUTO: 9.5 FL (ref 9.2–12.9)
POCT GLUCOSE: 125 MG/DL (ref 70–110)
POTASSIUM SERPL-SCNC: 3.8 MMOL/L (ref 3.5–5.1)
PROCALCITONIN SERPL IA-MCNC: 259.43 NG/ML
PROT SERPL-MCNC: 7.3 G/DL (ref 6–8.4)
RBC # BLD AUTO: 4.41 M/UL (ref 4–5.4)
SARS-COV-2 RDRP RESP QL NAA+PROBE: NEGATIVE
SMUDGE CELLS BLD QL SMEAR: PRESENT
SODIUM SERPL-SCNC: 136 MMOL/L (ref 136–145)
SPECIMEN SOURCE: NORMAL
WBC # BLD AUTO: 53.34 K/UL (ref 3.9–12.7)

## 2023-02-13 PROCEDURE — 85007 BL SMEAR W/DIFF WBC COUNT: CPT | Performed by: EMERGENCY MEDICINE

## 2023-02-13 PROCEDURE — 63600175 PHARM REV CODE 636 W HCPCS: Performed by: HOSPITALIST

## 2023-02-13 PROCEDURE — 83605 ASSAY OF LACTIC ACID: CPT | Performed by: EMERGENCY MEDICINE

## 2023-02-13 PROCEDURE — 85060 BLOOD SMEAR INTERPRETATION: CPT | Mod: ,,, | Performed by: PATHOLOGY

## 2023-02-13 PROCEDURE — 87502 INFLUENZA DNA AMP PROBE: CPT | Performed by: EMERGENCY MEDICINE

## 2023-02-13 PROCEDURE — 85027 COMPLETE CBC AUTOMATED: CPT | Performed by: EMERGENCY MEDICINE

## 2023-02-13 PROCEDURE — 25000003 PHARM REV CODE 250: Performed by: HOSPITALIST

## 2023-02-13 PROCEDURE — 96361 HYDRATE IV INFUSION ADD-ON: CPT

## 2023-02-13 PROCEDURE — 99900035 HC TECH TIME PER 15 MIN (STAT)

## 2023-02-13 PROCEDURE — 63600175 PHARM REV CODE 636 W HCPCS: Performed by: EMERGENCY MEDICINE

## 2023-02-13 PROCEDURE — 93010 EKG 12-LEAD: ICD-10-PCS | Mod: ,,, | Performed by: INTERNAL MEDICINE

## 2023-02-13 PROCEDURE — 80053 COMPREHEN METABOLIC PANEL: CPT | Performed by: EMERGENCY MEDICINE

## 2023-02-13 PROCEDURE — 93005 ELECTROCARDIOGRAM TRACING: CPT

## 2023-02-13 PROCEDURE — 85060 PATHOLOGIST REVIEW: ICD-10-PCS | Mod: ,,, | Performed by: PATHOLOGY

## 2023-02-13 PROCEDURE — 96374 THER/PROPH/DIAG INJ IV PUSH: CPT

## 2023-02-13 PROCEDURE — 99285 EMERGENCY DEPT VISIT HI MDM: CPT | Mod: 25

## 2023-02-13 PROCEDURE — 96372 THER/PROPH/DIAG INJ SC/IM: CPT | Performed by: EMERGENCY MEDICINE

## 2023-02-13 PROCEDURE — 94799 UNLISTED PULMONARY SVC/PX: CPT

## 2023-02-13 PROCEDURE — 27000221 HC OXYGEN, UP TO 24 HOURS

## 2023-02-13 PROCEDURE — 87502 INFLUENZA DNA AMP PROBE: CPT

## 2023-02-13 PROCEDURE — 21400001 HC TELEMETRY ROOM

## 2023-02-13 PROCEDURE — 84145 PROCALCITONIN (PCT): CPT | Performed by: HOSPITALIST

## 2023-02-13 PROCEDURE — U0002 COVID-19 LAB TEST NON-CDC: HCPCS | Performed by: EMERGENCY MEDICINE

## 2023-02-13 PROCEDURE — 94640 AIRWAY INHALATION TREATMENT: CPT | Mod: XB

## 2023-02-13 PROCEDURE — 87040 BLOOD CULTURE FOR BACTERIA: CPT | Mod: 59 | Performed by: EMERGENCY MEDICINE

## 2023-02-13 PROCEDURE — 25000003 PHARM REV CODE 250: Performed by: EMERGENCY MEDICINE

## 2023-02-13 PROCEDURE — 25000242 PHARM REV CODE 250 ALT 637 W/ HCPCS: Performed by: EMERGENCY MEDICINE

## 2023-02-13 PROCEDURE — 36415 COLL VENOUS BLD VENIPUNCTURE: CPT | Performed by: HOSPITALIST

## 2023-02-13 PROCEDURE — 94761 N-INVAS EAR/PLS OXIMETRY MLT: CPT

## 2023-02-13 PROCEDURE — 93010 ELECTROCARDIOGRAM REPORT: CPT | Mod: ,,, | Performed by: INTERNAL MEDICINE

## 2023-02-13 RX ORDER — DILTIAZEM HYDROCHLORIDE 60 MG/1
120 TABLET, FILM COATED ORAL 2 TIMES DAILY
Status: DISCONTINUED | OUTPATIENT
Start: 2023-02-13 | End: 2023-02-16 | Stop reason: HOSPADM

## 2023-02-13 RX ORDER — OXYCODONE AND ACETAMINOPHEN 5; 325 MG/1; MG/1
1 TABLET ORAL
Status: COMPLETED | OUTPATIENT
Start: 2023-02-13 | End: 2023-02-13

## 2023-02-13 RX ORDER — MORPHINE SULFATE 2 MG/ML
2 INJECTION, SOLUTION INTRAMUSCULAR; INTRAVENOUS EVERY 4 HOURS PRN
Status: DISCONTINUED | OUTPATIENT
Start: 2023-02-13 | End: 2023-02-15

## 2023-02-13 RX ORDER — IBUPROFEN 200 MG
24 TABLET ORAL
Status: DISCONTINUED | OUTPATIENT
Start: 2023-02-13 | End: 2023-02-16 | Stop reason: HOSPADM

## 2023-02-13 RX ORDER — IBUPROFEN 200 MG
16 TABLET ORAL
Status: DISCONTINUED | OUTPATIENT
Start: 2023-02-13 | End: 2023-02-16 | Stop reason: HOSPADM

## 2023-02-13 RX ORDER — NALOXONE HCL 0.4 MG/ML
0.02 VIAL (ML) INJECTION
Status: DISCONTINUED | OUTPATIENT
Start: 2023-02-13 | End: 2023-02-16 | Stop reason: HOSPADM

## 2023-02-13 RX ORDER — LEVOTHYROXINE SODIUM 125 UG/1
125 TABLET ORAL
Status: DISCONTINUED | OUTPATIENT
Start: 2023-02-14 | End: 2023-02-16 | Stop reason: HOSPADM

## 2023-02-13 RX ORDER — ONDANSETRON 2 MG/ML
4 INJECTION INTRAMUSCULAR; INTRAVENOUS EVERY 8 HOURS PRN
Status: DISCONTINUED | OUTPATIENT
Start: 2023-02-13 | End: 2023-02-16 | Stop reason: HOSPADM

## 2023-02-13 RX ORDER — MAG HYDROX/ALUMINUM HYD/SIMETH 200-200-20
30 SUSPENSION, ORAL (FINAL DOSE FORM) ORAL 4 TIMES DAILY PRN
Status: DISCONTINUED | OUTPATIENT
Start: 2023-02-13 | End: 2023-02-16 | Stop reason: HOSPADM

## 2023-02-13 RX ORDER — TALC
6 POWDER (GRAM) TOPICAL NIGHTLY PRN
Status: DISCONTINUED | OUTPATIENT
Start: 2023-02-13 | End: 2023-02-16 | Stop reason: HOSPADM

## 2023-02-13 RX ORDER — ACETAMINOPHEN 650 MG/1
650 SUPPOSITORY RECTAL EVERY 6 HOURS PRN
Status: DISCONTINUED | OUTPATIENT
Start: 2023-02-13 | End: 2023-02-16 | Stop reason: HOSPADM

## 2023-02-13 RX ORDER — IPRATROPIUM BROMIDE 0.5 MG/2.5ML
0.5 SOLUTION RESPIRATORY (INHALATION) EVERY 6 HOURS
Status: DISCONTINUED | OUTPATIENT
Start: 2023-02-14 | End: 2023-02-16 | Stop reason: HOSPADM

## 2023-02-13 RX ORDER — ARFORMOTEROL TARTRATE 15 UG/2ML
15 SOLUTION RESPIRATORY (INHALATION) 2 TIMES DAILY
Status: DISCONTINUED | OUTPATIENT
Start: 2023-02-14 | End: 2023-02-16 | Stop reason: HOSPADM

## 2023-02-13 RX ORDER — HYDROCODONE BITARTRATE AND ACETAMINOPHEN 5; 325 MG/1; MG/1
1 TABLET ORAL EVERY 6 HOURS PRN
Status: DISCONTINUED | OUTPATIENT
Start: 2023-02-13 | End: 2023-02-14

## 2023-02-13 RX ORDER — GABAPENTIN 400 MG/1
400 CAPSULE ORAL 4 TIMES DAILY
Status: DISCONTINUED | OUTPATIENT
Start: 2023-02-13 | End: 2023-02-16 | Stop reason: HOSPADM

## 2023-02-13 RX ORDER — SODIUM CHLORIDE 0.9 % (FLUSH) 0.9 %
3 SYRINGE (ML) INJECTION EVERY 12 HOURS PRN
Status: DISCONTINUED | OUTPATIENT
Start: 2023-02-13 | End: 2023-02-16 | Stop reason: HOSPADM

## 2023-02-13 RX ORDER — CALCITRIOL 0.25 UG/1
0.25 CAPSULE ORAL DAILY
Status: DISCONTINUED | OUTPATIENT
Start: 2023-02-14 | End: 2023-02-16 | Stop reason: HOSPADM

## 2023-02-13 RX ORDER — TIZANIDINE 4 MG/1
4 TABLET ORAL 3 TIMES DAILY
Status: DISCONTINUED | OUTPATIENT
Start: 2023-02-13 | End: 2023-02-16 | Stop reason: HOSPADM

## 2023-02-13 RX ORDER — IPRATROPIUM BROMIDE AND ALBUTEROL SULFATE 2.5; .5 MG/3ML; MG/3ML
3 SOLUTION RESPIRATORY (INHALATION)
Status: COMPLETED | OUTPATIENT
Start: 2023-02-13 | End: 2023-02-13

## 2023-02-13 RX ORDER — IPRATROPIUM BROMIDE AND ALBUTEROL SULFATE 2.5; .5 MG/3ML; MG/3ML
3 SOLUTION RESPIRATORY (INHALATION) EVERY 6 HOURS PRN
Status: DISCONTINUED | OUTPATIENT
Start: 2023-02-13 | End: 2023-02-16 | Stop reason: HOSPADM

## 2023-02-13 RX ORDER — TIZANIDINE 4 MG/1
4 TABLET ORAL 3 TIMES DAILY
Status: DISCONTINUED | OUTPATIENT
Start: 2023-02-14 | End: 2023-02-13

## 2023-02-13 RX ORDER — GLUCAGON 1 MG
1 KIT INJECTION
Status: DISCONTINUED | OUTPATIENT
Start: 2023-02-13 | End: 2023-02-16 | Stop reason: HOSPADM

## 2023-02-13 RX ORDER — ENOXAPARIN SODIUM 100 MG/ML
40 INJECTION SUBCUTANEOUS EVERY 24 HOURS
Status: DISCONTINUED | OUTPATIENT
Start: 2023-02-14 | End: 2023-02-16 | Stop reason: HOSPADM

## 2023-02-13 RX ORDER — PROMETHAZINE HYDROCHLORIDE 25 MG/1
25 TABLET ORAL EVERY 6 HOURS PRN
Status: DISCONTINUED | OUTPATIENT
Start: 2023-02-13 | End: 2023-02-16 | Stop reason: HOSPADM

## 2023-02-13 RX ORDER — SODIUM CHLORIDE, SODIUM LACTATE, POTASSIUM CHLORIDE, CALCIUM CHLORIDE 600; 310; 30; 20 MG/100ML; MG/100ML; MG/100ML; MG/100ML
INJECTION, SOLUTION INTRAVENOUS CONTINUOUS
Status: DISCONTINUED | OUTPATIENT
Start: 2023-02-13 | End: 2023-02-14

## 2023-02-13 RX ORDER — ACETAMINOPHEN 325 MG/1
650 TABLET ORAL EVERY 8 HOURS PRN
Status: DISCONTINUED | OUTPATIENT
Start: 2023-02-13 | End: 2023-02-16 | Stop reason: HOSPADM

## 2023-02-13 RX ORDER — TERBUTALINE SULFATE 1 MG/ML
0.25 INJECTION SUBCUTANEOUS ONCE
Status: COMPLETED | OUTPATIENT
Start: 2023-02-13 | End: 2023-02-13

## 2023-02-13 RX ORDER — AMOXICILLIN 250 MG
1 CAPSULE ORAL 2 TIMES DAILY PRN
Status: DISCONTINUED | OUTPATIENT
Start: 2023-02-13 | End: 2023-02-14

## 2023-02-13 RX ADMIN — Medication 6 MG: at 09:02

## 2023-02-13 RX ADMIN — SODIUM CHLORIDE, POTASSIUM CHLORIDE, SODIUM LACTATE AND CALCIUM CHLORIDE: 600; 310; 30; 20 INJECTION, SOLUTION INTRAVENOUS at 10:02

## 2023-02-13 RX ADMIN — IPRATROPIUM BROMIDE AND ALBUTEROL SULFATE 3 ML: 2.5; .5 SOLUTION RESPIRATORY (INHALATION) at 04:02

## 2023-02-13 RX ADMIN — OXYCODONE HYDROCHLORIDE AND ACETAMINOPHEN 1 TABLET: 5; 325 TABLET ORAL at 04:02

## 2023-02-13 RX ADMIN — SODIUM CHLORIDE 1686 ML: 9 INJECTION, SOLUTION INTRAVENOUS at 07:02

## 2023-02-13 RX ADMIN — TERBUTALINE SULFATE 0.25 MG: 1 INJECTION SUBCUTANEOUS at 05:02

## 2023-02-13 RX ADMIN — TIZANIDINE 4 MG: 4 TABLET ORAL at 11:02

## 2023-02-13 RX ADMIN — SODIUM CHLORIDE 500 ML: 9 INJECTION, SOLUTION INTRAVENOUS at 05:02

## 2023-02-13 RX ADMIN — CEFEPIME 2 G: 2 INJECTION, POWDER, FOR SOLUTION INTRAVENOUS at 07:02

## 2023-02-13 RX ADMIN — DILTIAZEM HYDROCHLORIDE 120 MG: 60 TABLET, FILM COATED ORAL at 09:02

## 2023-02-13 RX ADMIN — HYDROCODONE BITARTRATE AND ACETAMINOPHEN 1 TABLET: 5; 325 TABLET ORAL at 11:02

## 2023-02-13 RX ADMIN — ONDANSETRON 4 MG: 2 INJECTION INTRAMUSCULAR; INTRAVENOUS at 10:02

## 2023-02-13 RX ADMIN — GABAPENTIN 400 MG: 400 CAPSULE ORAL at 09:02

## 2023-02-13 RX ADMIN — BUSPIRONE HYDROCHLORIDE 7.5 MG: 5 TABLET ORAL at 09:02

## 2023-02-13 NOTE — ED PROVIDER NOTES
SCRIBE #1 NOTE: I, Meli Kim, am scribing for, and in the presence of, Matt Dejesus Jr., MD. I have scribed the entire note.       History     Chief Complaint   Patient presents with    Shortness of Breath     Increased SOB. Hx of COPD. Duoneb given by [facility]      Review of patient's allergies indicates:   Allergen Reactions    Levofloxacin     Nitrofurantoin monohyd/m-cryst Other (See Comments)    Penicillins Other (See Comments)    Ciprofloxacin Itching and Rash         History of Present Illness     HPI    2023, 4:21 PM  History obtained from the patient      History of Present Illness: Christine Horner is a 63 y.o. female patient with a PMHx of COPD who presents to the Emergency Department for evaluation of SOB which onset gradually in the week PTA. Symptoms are constant and moderate in severity. No mitigating factors reported. SOB is exacerbated when laying flat. Associated sxs include cough. Patient denies any fever, congestion, N/V/D, abd pain, and all other sxs at this time. Prior Tx includes duoneb given en route by EMS. No further complaints or concerns at this time.       Arrival mode:   EMS    PCP: Eloy Hdez MD        Past Medical History:  Past Medical History:   Diagnosis Date    Arthritis     Chronic back pain     COPD (chronic obstructive pulmonary disease)     COPD with acute exacerbation 2016    Pneumonia     SOB (shortness of breath)        Past Surgical History:  Past Surgical History:   Procedure Laterality Date    BACK SURGERY       SECTION      TONSILLECTOMY      WISDOM TOOTH EXTRACTION           Family History:  Family History   Problem Relation Age of Onset    Cancer Father     Diabetes Maternal Grandmother        Social History:  Social History     Tobacco Use    Smoking status: Former     Packs/day: 1.00     Years: 30.00     Pack years: 30.00     Types: Cigarettes     Quit date: 1/3/2012     Years since quittin.1    Smokeless tobacco: Not on file    Substance and Sexual Activity    Alcohol use: No    Drug use: No    Sexual activity: Never     Birth control/protection: None        Review of Systems     Review of Systems   Constitutional:  Negative for fever.   HENT:  Negative for congestion and sore throat.    Respiratory:  Positive for cough and shortness of breath.    Cardiovascular:  Negative for chest pain.   Gastrointestinal:  Negative for diarrhea, nausea and vomiting.   Genitourinary:  Negative for dysuria.   Musculoskeletal:  Negative for back pain.   Skin:  Negative for rash.   Neurological:  Negative for dizziness and weakness.   Hematological:  Does not bruise/bleed easily.   All other systems reviewed and are negative.     Physical Exam     Initial Vitals [02/13/23 1537]   BP Pulse Resp Temp SpO2   122/76 (!) 120 20 99.2 °F (37.3 °C) (!) 92 %      MAP       --          Physical Exam  Nursing Notes and Vital Signs Reviewed.  Constitutional: Patient is in no acute distress. Well-developed and well-nourished.  Head: Atraumatic. Normocephalic.  Eyes:  EOM intact.  No scleral icterus.  ENT: Mucous membranes are moist.  Nares clear   Neck:  Full ROM. No JVD.  Cardiovascular: Regular rate. Regular rhythm No murmurs, rubs, or gallops. Distal pulses are 2+ and symmetric  Pulmonary/Chest: No respiratory distress. Clear to auscultation bilaterally. No wheezing or rales.  Equal chest wall rise bilaterally  Abdominal: Soft and non-distended.  There is no tenderness.  No rebound, guarding, or rigidity. Good bowel sounds.  Genitourinary: No CVA tenderness.  No suprapubic tenderness  Musculoskeletal: Moves all extremities. No obvious deformities.  5 x 5 strength in all extremities   Skin: Warm and dry.  Neurological:  Alert, awake, and appropriate.  Normal speech.  No acute focal neurological deficits are appreciated.  Two through 12 intact bilaterally.  Psychiatric: Normal affect. Good eye contact. Appropriate in content.    ED Course   Procedures  ED Vital  Signs:  Vitals:    02/13/23 1537 02/13/23 1643 02/13/23 1647 02/13/23 1650   BP: 122/76      Pulse: (!) 120 (!) 113  (!) 124   Resp: 20  18 18   Temp: 99.2 °F (37.3 °C)      TempSrc: Axillary      SpO2: (!) 92%   (!) 90%   Weight:        02/13/23 1655 02/13/23 1659 02/13/23 1703 02/13/23 1718   BP:   107/82    Pulse: (!) 124 (!) 124 (!) 116    Resp: 18 18     Temp:       TempSrc:       SpO2: (!) 92% (!) 92% (!) 92%    Weight:    56.2 kg (123 lb 14.4 oz)    02/13/23 1732 02/13/23 1810 02/13/23 1812 02/13/23 1813   BP: (!) 90/57  (!) 97/55    Pulse: (!) 132 (!) 120  (!) 122   Resp: (!) 26      Temp:       TempSrc:       SpO2: (!) 91% 95%  95%   Weight:           Abnormal Lab Results:  Labs Reviewed   CBC W/ AUTO DIFFERENTIAL - Abnormal; Notable for the following components:       Result Value    WBC 53.34 (*)     Hemoglobin 11.6 (*)     MCH 26.3 (*)     MCHC 31.2 (*)     Platelets 794 (*)     Platelet Estimate Increased (*)     All other components within normal limits    Narrative:      WBC critical result(s) called and verbal readback obtained from   KILO SYLVESTER RN by BENITO 02/13/2023 17:28   COMPREHENSIVE METABOLIC PANEL - Abnormal; Notable for the following components:    Chloride 90 (*)     CO2 18 (*)     Albumin 3.3 (*)     Alkaline Phosphatase 143 (*)     AST 66 (*)     Anion Gap 28 (*)     All other components within normal limits   LACTIC ACID, PLASMA - Abnormal; Notable for the following components:    Lactate (Lactic Acid) 2.7 (*)     All other components within normal limits   INFLUENZA A & B BY MOLECULAR   CULTURE, BLOOD   CULTURE, BLOOD   SARS-COV-2 RNA AMPLIFICATION, QUAL   URINALYSIS, REFLEX TO URINE CULTURE        All Lab Results:  Results for orders placed or performed during the hospital encounter of 02/13/23   Influenza A & B by Molecular    Specimen: Nasopharyngeal Swab   Result Value Ref Range    Influenza A, Molecular Negative Negative    Influenza B, Molecular Negative Negative    Flu A & B  Source Nasal swab    CBC auto differential   Result Value Ref Range    WBC 53.34 (HH) 3.90 - 12.70 K/uL    RBC 4.41 4.00 - 5.40 M/uL    Hemoglobin 11.6 (L) 12.0 - 16.0 g/dL    Hematocrit 37.2 37.0 - 48.5 %    MCV 84 82 - 98 fL    MCH 26.3 (L) 27.0 - 31.0 pg    MCHC 31.2 (L) 32.0 - 36.0 g/dL    RDW 14.4 11.5 - 14.5 %    Platelets 794 (H) 150 - 450 K/uL    MPV 9.5 9.2 - 12.9 fL    Immature Granulocytes CANCELED 0.0 - 0.5 %    Immature Grans (Abs) CANCELED 0.00 - 0.04 K/uL    nRBC 0 0 /100 WBC    Gran % 55.0 38.0 - 73.0 %    Lymph % 40.0 18.0 - 48.0 %    Mono % 5.0 4.0 - 15.0 %    Eosinophil % 0.0 0.0 - 8.0 %    Basophil % 0.0 0.0 - 1.9 %    Platelet Estimate Increased (A)     Ovalocytes Occasional     Westerly Cells Moderate     Smudge Cells Present     Differential Method Manual    Comprehensive metabolic panel   Result Value Ref Range    Sodium 136 136 - 145 mmol/L    Potassium 3.8 3.5 - 5.1 mmol/L    Chloride 90 (L) 95 - 110 mmol/L    CO2 18 (L) 23 - 29 mmol/L    Glucose 97 70 - 110 mg/dL    BUN 12 8 - 23 mg/dL    Creatinine 0.8 0.5 - 1.4 mg/dL    Calcium 9.4 8.7 - 10.5 mg/dL    Total Protein 7.3 6.0 - 8.4 g/dL    Albumin 3.3 (L) 3.5 - 5.2 g/dL    Total Bilirubin 0.4 0.1 - 1.0 mg/dL    Alkaline Phosphatase 143 (H) 55 - 135 U/L    AST 66 (H) 10 - 40 U/L    ALT 44 10 - 44 U/L    Anion Gap 28 (H) 8 - 16 mmol/L    eGFR >60 >60 mL/min/1.73 m^2   Lactic acid, plasma   Result Value Ref Range    Lactate (Lactic Acid) 2.7 (H) 0.5 - 2.2 mmol/L   COVID-19 Rapid Screening   Result Value Ref Range    SARS-CoV-2 RNA, Amplification, Qual Negative Negative           Imaging Results:  Imaging Results              X-Ray Chest AP Portable (Final result)  Result time 02/13/23 18:09:53      Final result by Radha Lopez MD (02/13/23 18:09:53)                   Impression:      As above      Electronically signed by: John Garcia  Date:    02/13/2023  Time:    18:09               Narrative:    EXAMINATION:  XR CHEST AP  PORTABLE    CLINICAL HISTORY:  dyspnea;    TECHNIQUE:  Single frontal view of the chest was performed.    COMPARISON:  Prior    FINDINGS:  Deformity of the posterolateral right ribs.  Curvilinear opacities suggestive of atelectasis or scarring.  Increase nodular opacities in the right midlung zone may relate to developing new right mid and right lower lung zone pneumonia.    Bones are intact.                                       The EKG was ordered, reviewed, and independently interpreted by the ED provider.  Interpretation time: 17:06  Rate: 127 BPM  Rhythm: sinus tachycardia with premature supraventricular complexes  Interpretation: Left anterior fascicular block. Nonspecific ST and T wave abnormality. No STEMI.         The Emergency Provider reviewed the vital signs and test results, which are outlined above.     ED Discussion       6:46 PM: Discussed pt's case with Dr. Tapia (Hematology and Oncology) who recommends admission, pna treatment, and checking quantitative immunoglobulins.      7:25 PM: Discussed case with Herber Hardy MD (Hospital Medicine). Dr. Hardy agrees with current care and management of pt and accepts admission.   Admitting Service: Hospital Medicine  Admitting Physician: Dr. Hardy  Admit to: inpatient med/tele  7:25 PM: Re-evaluated pt. I have discussed test results, shared treatment plan, and the need for admission with patient and family at bedside. Pt and family express understanding at this time and agree with all information. All questions answered. Pt and family have no further questions or concerns at this time. Pt is ready for admit.    Medical Decision Making:   History:   Old Medical Records: I decided to obtain old medical records.  Old Records Summarized: records from clinic visits.       <> Summary of Records: Read the patient's history of CLL.  [facility] a [facility]  Clinical Tests:   Lab Tests: Ordered and Reviewed  Radiological Study: Ordered  "and Reviewed  Medical Tests: Ordered and Reviewed  Patient presents with worsening shortness of breath states that she has "pneumonia".  She has a history of COPD and is oxygen dependent at home with 3 L by nasal cannula.  She has faint end expiratory wheezes on exam.  I ordered an EKG and interpreted.  She has no sinus tachycardia without STEMI.  I ordered a chest x-ray and visualize it and agree there is right middle lobe pneumonia.  I ordered her lab studies.  I have reviewed each.  She has [place] of 8[address].  The patient states this is higher than usual for her CLL.  She has a lactate of 2.7.  She has a mild decrease in her CO2 at 18 the negative flu and negative COVID.  By the patient's underlying comorbidities of cancer and oxygen-dependent COPD the patient is being admitted to Hospital Medicine for further treatment.  We did consult with heme Onc who recommended admission and checking quantitative immunoglobulins.  We subsequently discussed the case with Hospital Medicine who agreed to accept.  Patient is aware.           ED Medication(s):  Medications   ceFEPIme (MAXIPIME) 2 g in dextrose 5 % in water (D5W) 5 % 50 mL IVPB (MB+) (2 g Intravenous New Bag 2/13/23 1924)   sodium chloride 0.9% bolus 1,686 mL 1,686 mL (1,686 mLs Intravenous New Bag 2/13/23 1923)   sodium chloride 0.9% bolus 500 mL 500 mL (0 mLs Intravenous Stopped 2/13/23 1805)   terbutaline injection 0.25 mg (0.25 mg Subcutaneous Given 2/13/23 1722)   albuterol-ipratropium 2.5 mg-0.5 mg/3 mL nebulizer solution 3 mL (3 mLs Nebulization Given 2/13/23 1659)   oxyCODONE-acetaminophen 5-325 mg per tablet 1 tablet (1 tablet Oral Given 2/13/23 1647)       New Prescriptions    No medications on file               Scribe Attestation:   Scribe #1: I performed the above scribed service and the documentation accurately describes the services I performed. I attest to the accuracy of the note.     Attending:   Physician Attestation Statement for Scribe " #1: I, Matt Dejesus Jr., MD, personally performed the services described in this documentation, as scribed by Meli Kim, in my presence, and it is both accurate and complete.           Clinical Impression       ICD-10-CM ICD-9-CM   1. Pneumonia due to infectious organism, unspecified laterality, unspecified part of lung  J18.9 486   2. Dyspnea  R06.00 786.09   3. Leukocytosis, unspecified type  D72.829 288.60   4. Personal history of CLL (chronic lymphocytic leukemia)  Z85.6 V10.61       Disposition:   Disposition: Admitted  Condition: Fair      Matt Dejesus Jr., MD  02/13/23 1940

## 2023-02-14 PROBLEM — I47.10 PAROXYSMAL SVT (SUPRAVENTRICULAR TACHYCARDIA): Status: ACTIVE | Noted: 2023-02-14

## 2023-02-14 PROBLEM — C91.10 CLL (CHRONIC LYMPHOCYTIC LEUKEMIA): Status: ACTIVE | Noted: 2023-02-14

## 2023-02-14 PROBLEM — E89.0 POSTABLATIVE HYPOTHYROIDISM: Status: ACTIVE | Noted: 2023-02-14

## 2023-02-14 PROBLEM — D64.9 ANEMIA: Status: ACTIVE | Noted: 2023-02-14

## 2023-02-14 LAB
ALBUMIN SERPL BCP-MCNC: 2.3 G/DL (ref 3.5–5.2)
ALP SERPL-CCNC: 89 U/L (ref 55–135)
ALT SERPL W/O P-5'-P-CCNC: 26 U/L (ref 10–44)
ANION GAP SERPL CALC-SCNC: 17 MMOL/L (ref 8–16)
ANISOCYTOSIS BLD QL SMEAR: SLIGHT
AST SERPL-CCNC: 40 U/L (ref 10–40)
BACTERIA #/AREA URNS HPF: NORMAL /HPF
BASOPHILS # BLD AUTO: 0.11 K/UL (ref 0–0.2)
BASOPHILS NFR BLD: 0.4 % (ref 0–1.9)
BILIRUB SERPL-MCNC: 0.2 MG/DL (ref 0.1–1)
BILIRUB UR QL STRIP: NEGATIVE
BUN SERPL-MCNC: 12 MG/DL (ref 8–23)
CALCIUM SERPL-MCNC: 7.7 MG/DL (ref 8.7–10.5)
CHLORIDE SERPL-SCNC: 96 MMOL/L (ref 95–110)
CLARITY UR: CLEAR
CO2 SERPL-SCNC: 19 MMOL/L (ref 23–29)
COLOR UR: ABNORMAL
CREAT SERPL-MCNC: 0.7 MG/DL (ref 0.5–1.4)
DACRYOCYTES BLD QL SMEAR: ABNORMAL
DIFFERENTIAL METHOD: ABNORMAL
EOSINOPHIL # BLD AUTO: 0.1 K/UL (ref 0–0.5)
EOSINOPHIL NFR BLD: 0.2 % (ref 0–8)
ERYTHROCYTE [DISTWIDTH] IN BLOOD BY AUTOMATED COUNT: 14.3 % (ref 11.5–14.5)
EST. GFR  (NO RACE VARIABLE): >60 ML/MIN/1.73 M^2
FERRITIN SERPL-MCNC: 293 NG/ML (ref 20–300)
GLUCOSE SERPL-MCNC: 108 MG/DL (ref 70–110)
GLUCOSE UR QL STRIP: NEGATIVE
HCT VFR BLD AUTO: 25.9 % (ref 37–48.5)
HGB BLD-MCNC: 8.2 G/DL (ref 12–16)
HGB UR QL STRIP: NEGATIVE
HYALINE CASTS #/AREA URNS LPF: 1 /LPF
IGA SERPL-MCNC: 195 MG/DL (ref 40–350)
IGG SERPL-MCNC: 540 MG/DL (ref 650–1600)
IGM SERPL-MCNC: 30 MG/DL (ref 50–300)
IMM GRANULOCYTES # BLD AUTO: 0.17 K/UL (ref 0–0.04)
IMM GRANULOCYTES NFR BLD AUTO: 0.6 % (ref 0–0.5)
IRON SERPL-MCNC: 27 UG/DL (ref 30–160)
KETONES UR QL STRIP: ABNORMAL
LEUKOCYTE ESTERASE UR QL STRIP: NEGATIVE
LYMPHOCYTES # BLD AUTO: 17.5 K/UL (ref 1–4.8)
LYMPHOCYTES NFR BLD: 57.6 % (ref 18–48)
MAGNESIUM SERPL-MCNC: 1.1 MG/DL (ref 1.6–2.6)
MCH RBC QN AUTO: 27.4 PG (ref 27–31)
MCHC RBC AUTO-ENTMCNC: 31.7 G/DL (ref 32–36)
MCV RBC AUTO: 87 FL (ref 82–98)
MICROSCOPIC COMMENT: NORMAL
MONOCYTES # BLD AUTO: 0.5 K/UL (ref 0.3–1)
MONOCYTES NFR BLD: 1.7 % (ref 4–15)
NEUTROPHILS # BLD AUTO: 12 K/UL (ref 1.8–7.7)
NEUTROPHILS NFR BLD: 39.5 % (ref 38–73)
NITRITE UR QL STRIP: POSITIVE
NRBC BLD-RTO: 0 /100 WBC
OVALOCYTES BLD QL SMEAR: ABNORMAL
PATH REV BLD -IMP: NORMAL
PH UR STRIP: 6 [PH] (ref 5–8)
PLATELET # BLD AUTO: 460 K/UL (ref 150–450)
PLATELET BLD QL SMEAR: ABNORMAL
PMV BLD AUTO: 9.8 FL (ref 9.2–12.9)
POCT GLUCOSE: 100 MG/DL (ref 70–110)
POIKILOCYTOSIS BLD QL SMEAR: SLIGHT
POTASSIUM SERPL-SCNC: 3.1 MMOL/L (ref 3.5–5.1)
PROT SERPL-MCNC: 4.8 G/DL (ref 6–8.4)
PROT UR QL STRIP: ABNORMAL
RBC # BLD AUTO: 2.99 M/UL (ref 4–5.4)
RBC #/AREA URNS HPF: 1 /HPF (ref 0–4)
SATURATED IRON: 11 % (ref 20–50)
SODIUM SERPL-SCNC: 132 MMOL/L (ref 136–145)
SP GR UR STRIP: 1.02 (ref 1–1.03)
TARGETS BLD QL SMEAR: ABNORMAL
TOTAL IRON BINDING CAPACITY: 241 UG/DL (ref 250–450)
TRANSFERRIN SERPL-MCNC: 163 MG/DL (ref 200–375)
URN SPEC COLLECT METH UR: ABNORMAL
UROBILINOGEN UR STRIP-ACNC: NEGATIVE EU/DL
WBC # BLD AUTO: 30.37 K/UL (ref 3.9–12.7)

## 2023-02-14 PROCEDURE — 94664 DEMO&/EVAL PT USE INHALER: CPT

## 2023-02-14 PROCEDURE — 36415 COLL VENOUS BLD VENIPUNCTURE: CPT | Performed by: FAMILY MEDICINE

## 2023-02-14 PROCEDURE — 36415 COLL VENOUS BLD VENIPUNCTURE: CPT | Performed by: HOSPITALIST

## 2023-02-14 PROCEDURE — 99900035 HC TECH TIME PER 15 MIN (STAT)

## 2023-02-14 PROCEDURE — 80053 COMPREHEN METABOLIC PANEL: CPT | Performed by: HOSPITALIST

## 2023-02-14 PROCEDURE — 84466 ASSAY OF TRANSFERRIN: CPT | Performed by: FAMILY MEDICINE

## 2023-02-14 PROCEDURE — 82728 ASSAY OF FERRITIN: CPT | Performed by: FAMILY MEDICINE

## 2023-02-14 PROCEDURE — 21400001 HC TELEMETRY ROOM

## 2023-02-14 PROCEDURE — 25000242 PHARM REV CODE 250 ALT 637 W/ HCPCS: Performed by: HOSPITALIST

## 2023-02-14 PROCEDURE — 83735 ASSAY OF MAGNESIUM: CPT | Performed by: HOSPITALIST

## 2023-02-14 PROCEDURE — 63600175 PHARM REV CODE 636 W HCPCS: Mod: TB,JG | Performed by: FAMILY MEDICINE

## 2023-02-14 PROCEDURE — 94640 AIRWAY INHALATION TREATMENT: CPT

## 2023-02-14 PROCEDURE — 94761 N-INVAS EAR/PLS OXIMETRY MLT: CPT

## 2023-02-14 PROCEDURE — 97166 OT EVAL MOD COMPLEX 45 MIN: CPT

## 2023-02-14 PROCEDURE — 97530 THERAPEUTIC ACTIVITIES: CPT

## 2023-02-14 PROCEDURE — 27000221 HC OXYGEN, UP TO 24 HOURS

## 2023-02-14 PROCEDURE — 94799 UNLISTED PULMONARY SVC/PX: CPT

## 2023-02-14 PROCEDURE — 97110 THERAPEUTIC EXERCISES: CPT

## 2023-02-14 PROCEDURE — 85025 COMPLETE CBC W/AUTO DIFF WBC: CPT | Performed by: HOSPITALIST

## 2023-02-14 PROCEDURE — 25000003 PHARM REV CODE 250: Performed by: FAMILY MEDICINE

## 2023-02-14 PROCEDURE — 82784 ASSAY IGA/IGD/IGG/IGM EACH: CPT | Performed by: HOSPITALIST

## 2023-02-14 PROCEDURE — 27000646 HC AEROBIKA DEVICE

## 2023-02-14 PROCEDURE — 63600175 PHARM REV CODE 636 W HCPCS: Performed by: HOSPITALIST

## 2023-02-14 PROCEDURE — 25000003 PHARM REV CODE 250: Performed by: HOSPITALIST

## 2023-02-14 PROCEDURE — 81000 URINALYSIS NONAUTO W/SCOPE: CPT | Performed by: EMERGENCY MEDICINE

## 2023-02-14 PROCEDURE — 51798 US URINE CAPACITY MEASURE: CPT

## 2023-02-14 PROCEDURE — 97162 PT EVAL MOD COMPLEX 30 MIN: CPT

## 2023-02-14 RX ORDER — AMOXICILLIN 250 MG
1 CAPSULE ORAL 2 TIMES DAILY
Status: DISCONTINUED | OUTPATIENT
Start: 2023-02-14 | End: 2023-02-16 | Stop reason: SDUPTHER

## 2023-02-14 RX ORDER — MEROPENEM AND SODIUM CHLORIDE 500 MG/50ML
500 INJECTION, SOLUTION INTRAVENOUS
Status: DISCONTINUED | OUTPATIENT
Start: 2023-02-14 | End: 2023-02-16

## 2023-02-14 RX ORDER — POLYETHYLENE GLYCOL 3350 17 G/17G
17 POWDER, FOR SOLUTION ORAL DAILY
Status: COMPLETED | OUTPATIENT
Start: 2023-02-15 | End: 2023-02-16

## 2023-02-14 RX ORDER — LANOLIN ALCOHOL/MO/W.PET/CERES
400 CREAM (GRAM) TOPICAL 2 TIMES DAILY
Status: DISCONTINUED | OUTPATIENT
Start: 2023-02-14 | End: 2023-02-16 | Stop reason: HOSPADM

## 2023-02-14 RX ORDER — POTASSIUM CHLORIDE 20 MEQ/1
40 TABLET, EXTENDED RELEASE ORAL 2 TIMES DAILY
Status: DISCONTINUED | OUTPATIENT
Start: 2023-02-14 | End: 2023-02-14

## 2023-02-14 RX ADMIN — ENOXAPARIN SODIUM 40 MG: 40 INJECTION SUBCUTANEOUS at 04:02

## 2023-02-14 RX ADMIN — BUSPIRONE HYDROCHLORIDE 7.5 MG: 5 TABLET ORAL at 09:02

## 2023-02-14 RX ADMIN — GABAPENTIN 400 MG: 400 CAPSULE ORAL at 09:02

## 2023-02-14 RX ADMIN — ARFORMOTEROL TARTRATE 15 MCG: 15 SOLUTION RESPIRATORY (INHALATION) at 07:02

## 2023-02-14 RX ADMIN — Medication 6 MG: at 09:02

## 2023-02-14 RX ADMIN — IPRATROPIUM BROMIDE 0.5 MG: 0.5 SOLUTION RESPIRATORY (INHALATION) at 12:02

## 2023-02-14 RX ADMIN — ONDANSETRON 4 MG: 2 INJECTION INTRAMUSCULAR; INTRAVENOUS at 03:02

## 2023-02-14 RX ADMIN — Medication 400 MG: at 09:02

## 2023-02-14 RX ADMIN — CEFEPIME 2 G: 2 INJECTION, POWDER, FOR SOLUTION INTRAVENOUS at 03:02

## 2023-02-14 RX ADMIN — MEROPENEM AND SODIUM CHLORIDE 500 MG: 500 INJECTION, SOLUTION INTRAVENOUS at 05:02

## 2023-02-14 RX ADMIN — TIZANIDINE 4 MG: 4 TABLET ORAL at 09:02

## 2023-02-14 RX ADMIN — MEROPENEM AND SODIUM CHLORIDE 500 MG: 500 INJECTION, SOLUTION INTRAVENOUS at 11:02

## 2023-02-14 RX ADMIN — IPRATROPIUM BROMIDE 0.5 MG: 0.5 SOLUTION RESPIRATORY (INHALATION) at 07:02

## 2023-02-14 RX ADMIN — GABAPENTIN 400 MG: 400 CAPSULE ORAL at 01:02

## 2023-02-14 RX ADMIN — LEVOTHYROXINE SODIUM 125 MCG: 125 TABLET ORAL at 05:02

## 2023-02-14 RX ADMIN — DILTIAZEM HYDROCHLORIDE 120 MG: 60 TABLET, FILM COATED ORAL at 09:02

## 2023-02-14 RX ADMIN — HYDROCODONE BITARTRATE AND ACETAMINOPHEN 1 TABLET: 5; 325 TABLET ORAL at 07:02

## 2023-02-14 RX ADMIN — POTASSIUM BICARBONATE 25 MEQ: 977.5 TABLET, EFFERVESCENT ORAL at 09:02

## 2023-02-14 RX ADMIN — GABAPENTIN 400 MG: 400 CAPSULE ORAL at 05:02

## 2023-02-14 RX ADMIN — SENNOSIDES AND DOCUSATE SODIUM 1 TABLET: 50; 8.6 TABLET ORAL at 09:02

## 2023-02-14 RX ADMIN — HYDROCODONE BITARTRATE AND ACETAMINOPHEN 1 TABLET: 5; 325 TABLET ORAL at 01:02

## 2023-02-14 RX ADMIN — POTASSIUM BICARBONATE 25 MEQ: 977.5 TABLET, EFFERVESCENT ORAL at 10:02

## 2023-02-14 RX ADMIN — Medication 400 MG: at 10:02

## 2023-02-14 RX ADMIN — TIZANIDINE 4 MG: 4 TABLET ORAL at 04:02

## 2023-02-14 RX ADMIN — CALCITRIOL CAPSULES 0.25 MCG 0.25 MCG: 0.25 CAPSULE ORAL at 09:02

## 2023-02-14 NOTE — ASSESSMENT & PLAN NOTE
Patient has chronic hypothyroidism. TFTs reviewed- No results found for: TSH.   -Plan:  -f/u TSH  -Will continue chronic levothyroxine and adjust for and clinical changes.

## 2023-02-14 NOTE — ASSESSMENT & PLAN NOTE
Currently on diltiazem outpatient.  Plan:  -telemetry  -continue home medication, titrate as needed

## 2023-02-14 NOTE — ASSESSMENT & PLAN NOTE
Patient currently follows Dr. Prieto from hem/onc.  CBC currently showing WBC 53.34, H/H 11.6/37.2, platelets 794.  Hematology consult by ED staff with recommendations to treat for pneumonia and order quantitative immunoglobulins.  Plan:  -continued treatment of pneumonia noted above  -f/u immunoglobulins  -f/u Heme-Onc outpatient

## 2023-02-14 NOTE — CONSULTS
[facility] - Telemetry ([facility])  Wound Care    Patient Name:  Christine Horner   MRN:  [id number]  Date: 2023  Diagnosis: <principal problem not specified>    History:     Past Medical History:   Diagnosis Date    Anemia 2023    Arthritis     Chronic back pain     COPD (chronic obstructive pulmonary disease)     COPD with acute exacerbation 2016    Pneumonia     SOB (shortness of breath)        Social History     Socioeconomic History    Marital status:    Tobacco Use    Smoking status: Former     Packs/day: 1.00     Years: 30.00     Pack years: 30.00     Types: Cigarettes     Quit date: 1/3/2012     Years since quittin.1   Substance and Sexual Activity    Alcohol use: No    Drug use: No    Sexual activity: Never     Birth control/protection: None       Precautions:     Allergies as of 2023 - Reviewed 2023   Allergen Reaction Noted    Levofloxacin  2021    Nitrofurantoin monohyd/m-cryst Other (See Comments) 2019    Penicillins Other (See Comments) 2013    Ciprofloxacin Itching and Rash 2014       WO Assessment Details/Treatment     Consulted on this 64 y/o F patient due to altered skin integrity to buttocks. Patient admitted  with SOB. She is awake an dalert, denies pain at present. Found sitting up in chair at bedside, assisted back to bed for assessment. Right buttocks with small partially intact scab noted, easily removed during cleansing revealing intact skin underlying. Small open stage 2 pressure injury noted to coccyx that measures 1x0.3x0.1cm, moist red wound bed, with surrounding blanchable redness. Cleansed with saline and moisture barrier paste applied. Discussed with patient, recommend turn and reposition frequently, will add EMMY pump to bed and consult the dietitian.      23 1025   WOCN Assessment   WOCN Total Time (mins) 45   Visit Date 23   Visit Time 1025   Consult Type New   WOCN Speciality Wound   Wound pressure;moisture    Number of Wounds 1   Intervention assessed;applied;chart review;coordination of care;team conference;orders   Teaching on-going;complication;discharge        Altered Skin Integrity 02/13/23 Coccyx Partial thickness tissue loss. Shallow open ulcer with a red or pink wound bed, without slough. Intact or Open/Ruptured Serum-filled blister.   Date First Assessed: 02/13/23   Altered Skin Integrity Present on Admission: yes  Location: Coccyx  Description of Altered Skin Integrity: Partial thickness tissue loss. Shallow open ulcer with a red or pink wound bed, without slough. Intact or Open/R...   Description of Altered Skin Integrity Partial thickness tissue loss. Shallow open ulcer with a red or pink wound bed, without slough. Intact or Open/Ruptured Serum-filled blister.   Dressing Appearance Open to air   Drainage Amount Scant   Drainage Characteristics/Odor Serous   Appearance Red   Tissue loss description Partial thickness   Periwound Area Redness   Wound Edges Open   Wound Length (cm) 1 cm   Wound Width (cm) 0.3 cm   Wound Depth (cm) 0.1 cm   Wound Volume (cm^3) 0.03 cm^3   Wound Surface Area (cm^2) 0.3 cm^2   Care Cleansed with:;Sterile normal saline;Applied:;Skin Barrier  (moisture barrier paste)       Recommendations made to primary team for wound care, pressure injury prevention, consult the dietitian. Orders placed.     02/14/2023

## 2023-02-14 NOTE — PT/OT/SLP EVAL
Physical Therapy Evaluation    Patient Name:  Christine Horner   MRN:  [id number]    Recommendations:     Discharge Recommendations: nursing facility, skilled   Discharge Equipment Recommendations: none   Barriers to discharge: None    Assessment:     Christine Horner is a 63 y.o. female admitted with a medical diagnosis of <principal problem not specified>.  She presents with the following impairments/functional limitations: weakness, impaired endurance, impaired self care skills, impaired functional mobility, gait instability, impaired balance, decreased ROM, decreased lower extremity function, impaired cardiopulmonary response to activity .    Rehab Prognosis: Fair; patient would benefit from acute skilled PT services to address these deficits and reach maximum level of function.    Recent Surgery: * No surgery found *      Plan:     During this hospitalization, patient to be seen 3 x/week to address the identified rehab impairments via gait training, therapeutic activities, therapeutic exercises and progress toward the following goals:    Plan of Care Expires:  02/28/23    Subjective     Chief Complaint: SOB   Patient/Family Comments/goals: WALK AGAIN  Pain/Comfort:  Pain Rating 1: 0/10  Pain Rating Post-Intervention 1: 0/10    Patients cultural, spiritual, Christian conflicts given the current situation:      Living Environment  PT LIVES AT HOME WITH  IN A ONE STORY HOME WITH NO STEPS TO ENTER   Prior to admission, patients level of function was BED/ WC BOUND FOR ABOUT 2 YEARS.  Equipment used at home: oxygen, shower chair, rollator, bedside commode.  DME owned (not currently used):  [employer] .  [facility]    Objective:     Communicated with NURSE WILSON AND Epic CHART REVIEW  prior to session.  Patient found supine with peripheral IV, telemetry, oxygen  upon PT entry to room.    General Precautions: Standard, fall  Orthopedic Precautions:N/A   Braces:  "N/A  Respiratory Status: Nasal cannula, flow 3 L/min    Exams:  Postural Exam:  Patient presented with the following abnormalities:    -       Rounded shoulders  -       Forward head  RLE ROM: LIMITED  RLE Strength: 3/5  LLE ROM: LIMITED  LLE Strength: 3/5    Functional Mobility:  Bed Mobility:     Rolling Right: stand by assistance  Supine to Sit: stand by assistance  Transfers:     Sit to Stand:  stand by assistance with no AD  Bed to Chair: contact guard assistance with  hand-held assist  using  Stand Pivot      AM-PAC 6 CLICK MOBILITY  Total Score:        Treatment & Education:  PT EDUCATED ON ROLE OF P.T. AND RECS  PT EDUCATED ON TE AP, TKE AND MIP  PT EDUCATED ON RISK FOR FALLS DUE TO GENERALIZED WEAKNESS, EDUCATED ON "CALL DON'T FALL", ENCOURAGED TO CALL FOR ASSISTANCE WITH ALL NEEDS SUCH AS BED<>CHAIR TRANSFERS OR TRIPS TO BATHROOM      Patient left up in chair with call button in reach and   present.    GOALS:   Multidisciplinary Problems       Physical Therapy Goals          Problem: Physical Therapy    Goal Priority Disciplines Outcome Goal Variances Interventions   Physical Therapy Goal     PT, PT/OT      Description: LT23  1. PT WILL COMPLETE BED MOBILITY IND  2. PT WILL STAND T/F TO CHAIR WITH RW AND S.  3. PT WILL GT TRAIN X 10' WITH RW AND MIN A                         History:     Past Medical History:   Diagnosis Date    Anemia 2023    Arthritis     Chronic back pain     COPD (chronic obstructive pulmonary disease)     COPD with acute exacerbation 2016    Pneumonia     SOB (shortness of breath)        Past Surgical History:   Procedure Laterality Date    BACK SURGERY       SECTION      TONSILLECTOMY      WISDOM TOOTH EXTRACTION         Time Tracking:     PT Received On: 23  PT Start Time: 1026     PT Stop Time: 1050  PT Total Time (min): 24 min     Billable Minutes: Evaluation 14 and Therapeutic Activity 10      2023  "

## 2023-02-14 NOTE — PLAN OF CARE
Problem: Adult Inpatient Plan of Care  Goal: Plan of Care Review  Outcome: Ongoing, Progressing  Flowsheets (Taken 2/13/2023 2326)  Plan of Care Reviewed With: patient  Goal: Patient-Specific Goal (Individualized)  Outcome: Ongoing, Progressing  Flowsheets (Taken 2/13/2023 2326)  Anxieties, Fears or Concerns: None  Individualized Care Needs: None  Goal: Absence of Hospital-Acquired Illness or Injury  Outcome: Ongoing, Progressing  Intervention: Identify and Manage Fall Risk  Flowsheets (Taken 2/13/2023 2326)  Safety Promotion/Fall Prevention:   assistive device/personal item within reach   bed alarm set   instructed to call staff for mobility   upper side rails raised x 2, lower siderails raised x 1 (Peds only)   high risk medications identified   Fall Risk reviewed with patient/family   medications reviewed   lighting adjusted  Intervention: Prevent Skin Injury  Flowsheets (Taken 2/13/2023 2326)  Body Position: position changed independently  Skin Protection:   adhesive use limited   tubing/devices free from skin contact   transparent dressing maintained   skin-to-skin areas padded  Intervention: Prevent and Manage VTE (Venous Thromboembolism) Risk  Flowsheets (Taken 2/13/2023 2326)  Activity Management: Rolling - L1  VTE Prevention/Management:   bleeding risk assessed   intravenous hydration   fluids promoted  Range of Motion: active ROM (range of motion) encouraged  Intervention: Prevent Infection  Flowsheets (Taken 2/13/2023 2326)  Infection Prevention:   environmental surveillance performed   equipment surfaces disinfected   hand hygiene promoted   single patient room provided   rest/sleep promoted   personal protective equipment utilized  Goal: Optimal Comfort and Wellbeing  Outcome: Ongoing, Progressing  Intervention: Monitor Pain and Promote Comfort  Flowsheets (Taken 2/13/2023 2326)  Pain Management Interventions:   awakened for pain meds per patient request   care clustered   diversional activity  provided   medication offered   pillow support provided   quiet environment facilitated   pain management plan reviewed with patient/caregiver   relaxation techniques promoted  Intervention: Provide Person-Centered Care  Flowsheets (Taken 2/13/2023 2326)  Trust Relationship/Rapport:   care explained   choices provided   emotional support provided   empathic listening provided   questions answered   thoughts/feelings acknowledged   reassurance provided   questions encouraged     Problem: Fluid Imbalance (Pneumonia)  Goal: Fluid Balance  Outcome: Ongoing, Progressing  Intervention: Monitor and Manage Fluid Balance  Flowsheets (Taken 2/13/2023 2326)  Fluid/Electrolyte Management:   electrolyte-binding therapy initiated   intravenous fluids adjusted   fluids provided   oral rehydration therapy initiated   intravenous fluid replacement initiated     Problem: Infection (Pneumonia)  Goal: Resolution of Infection Signs and Symptoms  Outcome: Ongoing, Progressing  Intervention: Prevent Infection Progression  Flowsheets (Taken 2/13/2023 2326)  Fever Reduction/Comfort Measures:   lightweight clothing   lightweight bedding  Infection Management: aseptic technique maintained     Problem: Respiratory Compromise (Pneumonia)  Goal: Effective Oxygenation and Ventilation  Outcome: Ongoing, Progressing  Intervention: Promote Airway Secretion Clearance  Flowsheets (Taken 2/13/2023 2326)  Breathing Techniques/Airway Clearance: deep/controlled cough encouraged  Cough And Deep Breathing: done independently per patient  Intervention: Optimize Oxygenation and Ventilation  Flowsheets (Taken 2/13/2023 2326)  Airway/Ventilation Management: calming measures promoted  Head of Bed (HOB) Positioning: HOB elevated     Problem: Adult Inpatient Plan of Care  Goal: Optimal Comfort and Wellbeing  Outcome: Ongoing, Progressing  Intervention: Monitor Pain and Promote Comfort  Flowsheets (Taken 2/13/2023 2326)  Pain Management Interventions:   awakened  for pain meds per patient request   care clustered   diversional activity provided   medication offered   pillow support provided   quiet environment facilitated   pain management plan reviewed with patient/caregiver   relaxation techniques promoted  Intervention: Provide Person-Centered Care  Flowsheets (Taken 2/13/2023 2326)  Trust Relationship/Rapport:   care explained   choices provided   emotional support provided   empathic listening provided   questions answered   thoughts/feelings acknowledged   reassurance provided   questions encouraged     Problem: Fluid Imbalance (Pneumonia)  Goal: Fluid Balance  Outcome: Ongoing, Progressing  Intervention: Monitor and Manage Fluid Balance  Flowsheets (Taken 2/13/2023 2326)  Fluid/Electrolyte Management:   electrolyte-binding therapy initiated   intravenous fluids adjusted   fluids provided   oral rehydration therapy initiated   intravenous fluid replacement initiated     Problem: Infection (Pneumonia)  Goal: Resolution of Infection Signs and Symptoms  Outcome: Ongoing, Progressing  Intervention: Prevent Infection Progression  Flowsheets (Taken 2/13/2023 2326)  Fever Reduction/Comfort Measures:   lightweight clothing   lightweight bedding  Infection Management: aseptic technique maintained     Problem: Respiratory Compromise (Pneumonia)  Goal: Effective Oxygenation and Ventilation  Outcome: Ongoing, Progressing  Intervention: Promote Airway Secretion Clearance  Flowsheets (Taken 2/13/2023 2326)  Breathing Techniques/Airway Clearance: deep/controlled cough encouraged  Cough And Deep Breathing: done independently per patient  Intervention: Optimize Oxygenation and Ventilation  Flowsheets (Taken 2/13/2023 2326)  Airway/Ventilation Management: calming measures promoted  Head of Bed (HOB) Positioning: HOB elevated

## 2023-02-14 NOTE — H&P
[facility] Medicine  History & Physical    Patient Name: Christine Horner  MRN: [id number]  Patient Class: IP- Inpatient  Admission Date: 2/13/2023  Attending Physician: Herber Hardy MD   Primary Care Provider: Eloy Hdez MD         Patient information was obtained from patient, past medical records and ER records.     Subjective:     Principal Problem:<principal problem not specified>    Chief Complaint:   Chief Complaint   Patient presents with    Shortness of Breath     Increased SOB. Hx of COPD. Duoneb given by [facility]         HPI: Christine Horner is a 63 y.o. female with a PMH  has a past medical history of Arthritis, Chronic back pain, COPD (chronic obstructive pulmonary disease), COPD with acute exacerbation (04/24/2016), Pneumonia, and SOB (shortness of breath). who presented to the ED complaining of progressively worsening shortness a breath x1 week duration with acute worsening yesterday.  Patient reports being on chronic home O2 of 3 L via nasal cannula now requiring up titration of supplemental oxygen.  Patient denied endorsing any fever, chills, sweats, chest pain, abdominal pain, dysuria, melena, hematochezia, diarrhea, or onset neurological deficits however did report intermittent nausea with vomiting, dyspnea on exertion, bilateral for chest pain upon deep inspiration described as sharp/stabbing in nature, rated 8/10 in severity, in addition to dyspnea worsened upon lying flat.  She reported no other known alleviating or aggravating factors and reported all other review of systems negative except as noted above.  Patient also reported DuoNebs administered at home had little to no relief in symptoms prompting her to come to the ED to be further evaluated.  Patient also reported similar symptoms occurred when she was diagnosed with pneumonia requiring hospitalization and IV antibiotics.  Hospital Medicine consulted by ED staff for admission for continued  treatment of pneumonia in setting of acute on chronic hypoxemic respiratory failure.     PCP: Eloy Hdez        Past Medical History:   Diagnosis Date    Arthritis     Chronic back pain     COPD (chronic obstructive pulmonary disease)     COPD with acute exacerbation 2016    Pneumonia     SOB (shortness of breath)        Past Surgical History:   Procedure Laterality Date    BACK SURGERY       SECTION      TONSILLECTOMY      WISDOM TOOTH EXTRACTION         Review of patient's allergies indicates:   Allergen Reactions    Levofloxacin     Nitrofurantoin monohyd/m-cryst Other (See Comments)    Penicillins Other (See Comments)    Ciprofloxacin Itching and Rash       No current facility-administered medications on file prior to encounter.     Current Outpatient Medications on File Prior to Encounter   Medication Sig    ADVAIR DISKUS 250-50 mcg/dose diskus inhaler USE 1 INHALATION TWICE DAILY **THANK YOU**    albuterol (PROAIR HFA) 90 mcg/actuation inhaler INHALE TWO PUFFS EVERY 4 TO 6 HOURS **THANK YOU**    albuterol (PROVENTIL) 2.5 mg /3 mL (0.083 %) nebulizer solution INHALE 1 VIAL VIA NEBULIZER EVERY 4 HOURS AS NEEDED **THANK YOU**    busPIRone (BUSPAR) 7.5 MG tablet Take 7.5 mg by mouth 2 (two) times a day.    calcitRIOL (ROCALTROL) 0.25 MCG Cap Take 0.25 mcg by mouth once daily.    calcium carbonate-magnesium hydroxide (ROLAIDS) 550-110 mg Chew Take 2,000 mg by mouth 3 (three) times daily as needed.    diltiaZEM (CARDIZEM) 120 MG tablet Take 120 mg by mouth 2 (two) times a day.    gabapentin (NEURONTIN) 400 MG capsule Take by mouth 4 (four) times daily.     levothyroxine (SYNTHROID) 125 MCG tablet Take 125 mcg by mouth before breakfast.    montelukast (SINGULAIR) 10 mg tablet Take 10 mg by mouth once daily.    tiotropium (SPIRIVA) 18 mcg inhalation capsule Inhale 18 mcg into the lungs once daily. Controller    tizanidine (ZANAFLEX) 4 MG tablet Take 1 tablet by mouth 3  (three) times daily.     fluticasone (FLONASE) 50 mcg/actuation nasal spray 2 sprays by Each Nare route 2 (two) times daily.    tiotropium-olodaterol (STIOLTO RESPIMAT) 2.5-2.5 mcg/actuation Mist Inhale 2 puffs into the lungs once daily.     Family History       Problem Relation (Age of Onset)    Cancer Father    Diabetes Maternal Grandmother          Tobacco Use    Smoking status: Former     Packs/day: 1.00     Years: 30.00     Pack years: 30.00     Types: Cigarettes     Quit date: 1/3/2012     Years since quittin.1    Smokeless tobacco: Not on file   Substance and Sexual Activity    Alcohol use: No    Drug use: No    Sexual activity: Never     Birth control/protection: None     Review of Systems   All other systems reviewed and are negative.  Objective:     Vital Signs (Most Recent):  Temp: 97.6 °F (36.4 °C) (23 2349)  Pulse: 102 (23 002)  Resp: 20 (23 002)  BP: 113/64 (239)  SpO2: (!) 94 % (23 002)   Vital Signs (24h Range):  Temp:  [97.6 °F (36.4 °C)-99.2 °F (37.3 °C)] 97.6 °F (36.4 °C)  Pulse:  [102-132] 102  Resp:  [15-28] 20  SpO2:  [90 %-96 %] 94 %  BP: ()/(55-82) 113/64     Weight: 56.8 kg (125 lb 3.5 oz)  Body mass index is 21.49 kg/m².    Physical Exam  Vitals reviewed.   Constitutional:       General: She is not in acute distress.     Appearance: Normal appearance. She is normal weight. She is not ill-appearing, toxic-appearing or diaphoretic.   HENT:      Head: Normocephalic and atraumatic.      Right Ear: External ear normal.      Left Ear: External ear normal.      Nose: Nose normal. No congestion or rhinorrhea.      Mouth/Throat:      Mouth: Mucous membranes are moist.      Pharynx: Oropharynx is clear. No oropharyngeal exudate or posterior oropharyngeal erythema.   Eyes:      General: No scleral icterus.     Extraocular Movements: Extraocular movements intact.      Conjunctiva/sclera: Conjunctivae normal.      Pupils: Pupils are equal, round,  and reactive to light.   Neck:      Vascular: No carotid bruit.   Cardiovascular:      Rate and Rhythm: Regular rhythm. Tachycardia present.      Pulses: Normal pulses.      Heart sounds: Normal heart sounds. No murmur heard.    No friction rub. No gallop.   Pulmonary:      Effort: Pulmonary effort is normal. No respiratory distress.      Breath sounds: Normal breath sounds. No stridor. No wheezing, rhonchi or rales.      Comments: Slightly diminished breath sounds noted throughout right middle and right lower lung fields without evidence of wheezes, rales, rhonchi noted.  Patient currently in no acute distress speaking in full sentences without use of accessory muscles noted.  Chest:      Chest wall: No tenderness.   Abdominal:      General: Abdomen is flat. Bowel sounds are normal. There is no distension.      Palpations: Abdomen is soft.      Tenderness: There is no abdominal tenderness. There is no right CVA tenderness, left CVA tenderness, guarding or rebound.      Hernia: No hernia is present.   Musculoskeletal:         General: No swelling, tenderness, deformity or signs of injury. Normal range of motion.      Cervical back: Normal range of motion and neck supple. No rigidity or tenderness.      Right lower leg: No edema.      Left lower leg: No edema.   Lymphadenopathy:      Cervical: No cervical adenopathy.   Skin:     General: Skin is warm and dry.      Capillary Refill: Capillary refill takes less than 2 seconds.      Coloration: Skin is not jaundiced or pale.      Findings: No bruising, erythema, lesion or rash.   Neurological:      General: No focal deficit present.      Mental Status: She is alert and oriented to person, place, and time. Mental status is at baseline.      Cranial Nerves: No cranial nerve deficit.      Sensory: No sensory deficit.      Motor: No weakness.      Coordination: Coordination normal.   Psychiatric:         Mood and Affect: Mood normal.         Behavior: Behavior normal.          Thought Content: Thought content normal.         Judgment: Judgment normal.         CRANIAL NERVES     CN III, IV, VI   Pupils are equal, round, and reactive to light.     Significant Labs: All pertinent labs within the past 24 hours have been reviewed.    Significant Imaging: I have reviewed all pertinent imaging results/findings within the past 24 hours.    LABS:  Recent Results (from the past 24 hour(s))   COVID-19 Rapid Screening    Collection Time: 02/13/23  4:45 PM   Result Value Ref Range    SARS-CoV-2 RNA, Amplification, Qual Negative Negative   Influenza A & B by Molecular    Collection Time: 02/13/23  4:46 PM    Specimen: Nasopharyngeal Swab   Result Value Ref Range    Influenza A, Molecular Negative Negative    Influenza B, Molecular Negative Negative    Flu A & B Source Nasal swab    CBC auto differential    Collection Time: 02/13/23  5:02 PM   Result Value Ref Range    WBC 53.34 (HH) 3.90 - 12.70 K/uL    RBC 4.41 4.00 - 5.40 M/uL    Hemoglobin 11.6 (L) 12.0 - 16.0 g/dL    Hematocrit 37.2 37.0 - 48.5 %    MCV 84 82 - 98 fL    MCH 26.3 (L) 27.0 - 31.0 pg    MCHC 31.2 (L) 32.0 - 36.0 g/dL    RDW 14.4 11.5 - 14.5 %    Platelets 794 (H) 150 - 450 K/uL    MPV 9.5 9.2 - 12.9 fL    Immature Granulocytes CANCELED 0.0 - 0.5 %    Immature Grans (Abs) CANCELED 0.00 - 0.04 K/uL    nRBC 0 0 /100 WBC    Gran % 55.0 38.0 - 73.0 %    Lymph % 40.0 18.0 - 48.0 %    Mono % 5.0 4.0 - 15.0 %    Eosinophil % 0.0 0.0 - 8.0 %    Basophil % 0.0 0.0 - 1.9 %    Platelet Estimate Increased (A)     Ovalocytes Occasional     Jessica Cells Moderate     Smudge Cells Present     Differential Method Manual    Comprehensive metabolic panel    Collection Time: 02/13/23  5:02 PM   Result Value Ref Range    Sodium 136 136 - 145 mmol/L    Potassium 3.8 3.5 - 5.1 mmol/L    Chloride 90 (L) 95 - 110 mmol/L    CO2 18 (L) 23 - 29 mmol/L    Glucose 97 70 - 110 mg/dL    BUN 12 8 - 23 mg/dL    Creatinine 0.8 0.5 - 1.4 mg/dL    Calcium 9.4 8.7 - 10.5  mg/dL    Total Protein 7.3 6.0 - 8.4 g/dL    Albumin 3.3 (L) 3.5 - 5.2 g/dL    Total Bilirubin 0.4 0.1 - 1.0 mg/dL    Alkaline Phosphatase 143 (H) 55 - 135 U/L    AST 66 (H) 10 - 40 U/L    ALT 44 10 - 44 U/L    Anion Gap 28 (H) 8 - 16 mmol/L    eGFR >60 >60 mL/min/1.73 m^2   Lactic acid, plasma    Collection Time: 02/13/23  5:02 PM   Result Value Ref Range    Lactate (Lactic Acid) 2.7 (H) 0.5 - 2.2 mmol/L   Procalcitonin    Collection Time: 02/13/23 10:39 PM   Result Value Ref Range    Procalcitonin 259.43 (H) <0.25 ng/mL   POCT glucose    Collection Time: 02/13/23 11:01 PM   Result Value Ref Range    POCT Glucose 125 (H) 70 - 110 mg/dL   Urinalysis, Reflex to Urine Culture Urine, Catheterized    Collection Time: 02/14/23  3:29 AM    Specimen: Urine   Result Value Ref Range    Specimen UA Urine, Catheterized     Color, UA Orange (A) Yellow, Straw, Jeannine    Appearance, UA Clear Clear    pH, UA 6.0 5.0 - 8.0    Specific Gravity, UA 1.020 1.005 - 1.030    Protein, UA Trace (A) Negative    Glucose, UA Negative Negative    Ketones, UA 1+ (A) Negative    Bilirubin (UA) Negative Negative    Occult Blood UA Negative Negative    Nitrite, UA Positive (A) Negative    Urobilinogen, UA Negative <2.0 EU/dL    Leukocytes, UA Negative Negative   Urinalysis Microscopic    Collection Time: 02/14/23  3:29 AM   Result Value Ref Range    RBC, UA 1 0 - 4 /hpf    Bacteria None None-Occ /hpf    Hyaline Casts, UA 1 0-1/lpf /lpf    Microscopic Comment SEE COMMENT        RADIOLOGY  X-Ray Chest AP Portable    Result Date: 2/13/2023  EXAMINATION: XR CHEST AP PORTABLE CLINICAL HISTORY: dyspnea; TECHNIQUE: Single frontal view of the chest was performed. COMPARISON: Prior FINDINGS: Deformity of the posterolateral right ribs.  Curvilinear opacities suggestive of atelectasis or scarring.  Increase nodular opacities in the right midlung zone may relate to developing new right mid and right lower lung zone pneumonia. Bones are intact.     As above  Electronically signed by: John Radha Date:    02/13/2023 Time:    18:09      EKG    MICROBIOLOGY    MDM      Assessment/Plan:     Centrilobular emphysema        COPD, very severe        Acute on chronic respiratory failure with hypoxia  Patient with Hypoxic Respiratory failure which is Acute on chronic.  she is on home oxygen at 3 LPM. Supplemental oxygen was provided and noted- Oxygen Concentration (%):  [36] 36.   Signs/symptoms of respiratory failure include- tachypnea and increased work of breathing. Contributing diagnoses includes - COPD, Interstitial lung disease, Pleural effusion and Pneumonia Labs and images were reviewed. Patient Has not had a recent ABG but does have evidence of anion gap metabolic acidosis, elevated lactic acid and, and decreased bicarb noted on labs. Will treat underlying causes and adjust management of respiratory failure as follows-   Plan:  -continue antibiotics  -DuoNebs p.r.n.  -titrate oxygen therapy as needed  -incentive spirometry  -f/u ABG      Paroxysmal SVT (supraventricular tachycardia)  Currently on diltiazem outpatient.  Plan:  -telemetry  -continue home medication, titrate as needed      Anemia  Appears above last reported baseline with H/H currently measuring 11.6/37.2 without evidence of active bleeding noted.  Plan:  -continue to monitor  -type/screen, transfuse as needed      CLL (chronic lymphocytic leukemia)  Patient currently follows Dr. Prieto from hem/onc.  CBC currently showing WBC 53.34, H/H 11.6/37.2, platelets 794.  Hematology consult by ED staff with recommendations to treat for pneumonia and order quantitative immunoglobulins.  Plan:  -continued treatment of pneumonia noted above  -f/u immunoglobulins  -f/u Heme-Onc      Postablative hypothyroidism  Patient has chronic hypothyroidism. TFTs reviewed- No results found for: TSH.   -Plan:  -f/u TSH  -Will continue chronic levothyroxine and adjust for and clinical changes.        VTE Risk Mitigation (From  admission, onward)         Ordered     enoxaparin injection 40 mg  Daily         02/13/23 2052     IP VTE HIGH RISK PATIENT  Once         02/13/23 2052     Place sequential compression device  Until discontinued         02/13/23 2052              //Core Measures   -DVT proph: SCDs, Lovenox    -Code status: Full    -Surrogate: none provided       Components of this note were documented using a voice recognition system and are subject to errors not corrected at the time the document was proof read. Please contact the author for any clarifications.        Herber Hardy MD  Department of Hospital Medicine   [facility] - Telemetry ([facility])

## 2023-02-14 NOTE — HOSPITAL COURSE
2/14 endorses symptom improvement with dyspnea, appetite and fatigue. Reports palpitations upon standing. Continue scheduled breathing treatments, intravenous antibiotic(s). Add acappella and incentive spirometer. Smoking cessation approximately 10 years ago. [facility], followed by Dr. Prieto  2/15 at baseline supplemental oxygen. Continue intravenous antibiotic(s) h/o pseudomonas pneumonia. Leukocytosis trending down. Awaiting skilled nursing facility placement. Blood culture negative growth to date.     2/16  Remains at baseline supplemental oxygen. On intravenous merrem for pmh pseudomonas pneumonia. Tolerated well without allergic reaction. Transitioned to po levaquin prior to discharge. Blood culture remain negative growth to date. Vital signs stable. Ambulatory oxygen evaluation performed without any changes to baseline supplemental oxygen.     Speech evaluation performed for aspiration risk and concerns for dysphagia. Modified barium swallow performed without overt signs or symptoms of aspiration. Speech recommended continued therapy due to risk for aspiration with multiple comorbidities.    Physical/occupational therapy recommended skilled nursing facility placement but patient refused due to financial constraints. Patient agreeable to homehealth physical/occupational therapy and speech therapy.     Patient seen and evaluated by me. Patient was determined to be suitable for d/c. Patient deemed stable for discharge to home with homehealth physical/occupational therapy and speech therapy and nurse practitioner to visit home program.

## 2023-02-14 NOTE — PT/OT/SLP EVAL
Occupational Therapy   Evaluation    Name: Christine Horner  MRN: [id number]  Admitting Diagnosis: <principal problem not specified>  Recent Surgery: * No surgery found *      Recommendations:     Discharge Recommendations: nursing facility, skilled  Discharge Equipment Recommendations:  none  Barriers to discharge:       Assessment:     Christine Horner is a 63 y.o. female with a medical diagnosis of <principal problem not specified>.  She presents with DEBILITY AND GENERALIZED WEAKNESS. Performance deficits affecting function: weakness, impaired functional mobility, decreased safety awareness, impaired endurance, gait instability, impaired balance, impaired self care skills, decreased lower extremity function, decreased upper extremity function.      Rehab Prognosis: Fair; patient would benefit from acute skilled OT services to address these deficits and reach maximum level of function.       Plan:     Patient to be seen   to address the above listed problems via self-care/home management, therapeutic activities, therapeutic exercises  Plan of Care Expires:    Plan of Care Reviewed with:      Subjective     Chief Complaint: DEBILITY AND GENERALIZED WEAKNESS  Patient/Family Comments/goals:     Occupational Profile:  Living Environment: LIVES WITH SPOUSE IN 1 STORY HOUSE WITH NO STEPS TO ENTER  Previous level of function: SBA WITH ADL'S AND MIN / SBA WITH TRANSFERS  Roles and Routines: OCCUPATIONAL THERAPY  Equipment Used at Home: oxygen, bedside commode, shower chair, rollator  Assistance upon Discharge:     Pain/Comfort:  Pain Rating 1: 0/10    Patients cultural, spiritual, Orthodox conflicts given the current situation:      Objective:     Communicated with: NURSE AND Epic CHART REVIEW prior to session.  Patient found HOB elevated with peripheral IV, telemetry, oxygen (3 LITERS) upon OT entry to room.    General Precautions: Standard, fall  Orthopedic Precautions: N/A  Braces: N/A  Respiratory Status: Nasal  cannula, flow 3 L/min    Occupational Performance:    Bed Mobility:    Patient completed Rolling/Turning to Right with minimum assistance  Patient completed Scooting/Bridging with minimum assistance  Patient completed Supine to Sit with minimum assistance    Functional Mobility/Transfers:  Patient completed Sit <> Stand Transfer with minimum assistance  with  hand-held assist   Patient completed Bed <> Chair Transfer using Step Transfer technique with minimum assistance with hand-held assist    Cognitive/Visual Perceptual:  Cognitive/Psychosocial Skills:     -       Oriented to: Person, Place, Time, and Situation   -       Follows Commands/attention:Follows multistep  commands  -       Communication: clear/fluent  -       Memory: No Deficits noted  -       Safety awareness/insight to disability: impaired     Physical Exam:  Upper Extremity Range of Motion:     -       Right Upper Extremity: WFL  -       Left Upper Extremity: WFL  Upper Extremity Strength:    -       Right Upper Extremity: MMT: 3/5 GROSSLY  -       Left Upper Extremity: MMT: 35 GROSSLY   Strength:    -       Right Upper Extremity: mmt: 3/5 grossly  -       Left Upper Extremity: mmt: 3/5 grossly    AMPAC 6 Click ADL:  AMPAC Total Score: 15    Treatment & Education:  Patient educated on role of OT in acute setting and benefits of participation. Educated on techniques to use to increase independence and decrease fall risk with functional transfers. Educated on importance of OOB activity and calling for A to transfer back to bed. Pt educated on HEP. P t performed 1 set x 10 reps b ue rom exercise (shoulder flexion; chest press; elbow flex/ext; hand/digits flex/ext). Encouraged completion of B UE AROM therex throughout the day to tolerance to increase functional strength and activity tolerance. Patient stated understanding and in agreement with POC.      Patient left up in chair with all lines intact, call button in reach, chair alarm on, nurse  notified, and spouse present    GOALS:   Multidisciplinary Problems       Occupational Therapy Goals          Problem: Occupational Therapy    Goal Priority Disciplines Outcome Interventions   Occupational Therapy Goal     OT, PT/OT     Description: O.T. GOALS TO BE MET BY 10-21-23  MIN A WITH UE DRESSING  PT WILL TOLERATE 1 SET X 8 REPS B UE ROM EXERCISE  SBA WITH BSC TRANSFER                         History:     Past Medical History:   Diagnosis Date    Anemia 2023    Arthritis     Chronic back pain     COPD (chronic obstructive pulmonary disease)     COPD with acute exacerbation 2016    Pneumonia     SOB (shortness of breath)          Past Surgical History:   Procedure Laterality Date    BACK SURGERY       SECTION      TONSILLECTOMY      WISDOM TOOTH EXTRACTION         Time Tracking:     OT Date of Treatment: 23  OT Start Time: 1021  OT Stop Time: 1044  OT Total Time (min): 23 min    Billable Minutes:Evaluation 13 minutes  Therapeutic Activity 10 minutes    2023

## 2023-02-14 NOTE — ASSESSMENT & PLAN NOTE
Patient with Hypoxic Respiratory failure which is Acute on chronic.  she is on home oxygen at 3 LPM. Supplemental oxygen was provided and noted- Oxygen Concentration (%):  [36] 36.   Signs/symptoms of respiratory failure include- tachypnea and increased work of breathing. Contributing diagnoses includes - COPD, Interstitial lung disease, Pleural effusion and Pneumonia Labs and images were reviewed. Patient Has not had a recent ABG but does have evidence of anion gap metabolic acidosis, elevated lactic acid and, and decreased bicarb noted on labs. Will treat underlying causes and adjust management of respiratory failure as follows-   Plan:  -continue antibiotics  -DuoNebs p.r.n.  -titrate oxygen therapy as needed  -incentive spirometry  -f/u ABG    Continue current regimen

## 2023-02-14 NOTE — SUBJECTIVE & OBJECTIVE
Interval History: See hospital course for today      Review of Systems   Constitutional:  Positive for activity change, appetite change (improved), fatigue (off and on since weekend) and fever.   HENT:  Negative for trouble swallowing.    Respiratory:  Positive for cough and shortness of breath.    Cardiovascular:  Positive for palpitations (upon standing).   Gastrointestinal:  Negative for abdominal pain, diarrhea, nausea and vomiting.   Allergic/Immunologic: Positive for immunocompromised state.   Neurological:  Positive for tremors and weakness.   Psychiatric/Behavioral:  The patient is nervous/anxious.    Objective:     Vital Signs (Most Recent):  Temp: 97.5 °F (36.4 °C) (02/14/23 0725)  Pulse: 87 (02/14/23 0725)  Resp: 19 (02/14/23 0743)  BP: (!) 117/58 (02/14/23 0725)  SpO2: 95 % (02/14/23 0725)   Vital Signs (24h Range):  Temp:  [97.5 °F (36.4 °C)-99.2 °F (37.3 °C)] 97.5 °F (36.4 °C)  Pulse:  [] 87  Resp:  [15-28] 19  SpO2:  [90 %-96 %] 95 %  BP: ()/(50-82) 117/58     Weight: 56.8 kg (125 lb 3.5 oz)  Body mass index is 21.49 kg/m².    Intake/Output Summary (Last 24 hours) at 2/14/2023 0905  Last data filed at 2/14/2023 0353  Gross per 24 hour   Intake --   Output 9115 ml   Net -9115 ml      Physical Exam  Vitals and nursing note reviewed.   Constitutional:       General: She is not in acute distress.     Appearance: She is normal weight. She is ill-appearing. She is not toxic-appearing.      Interventions: Nasal cannula in place.   HENT:      Head: Normocephalic and atraumatic.   Cardiovascular:      Rate and Rhythm: Normal rate.   Pulmonary:      Effort: Tachypnea and respiratory distress present.      Breath sounds: Decreased air movement present.      Comments: Tachypnea with conversing  Abdominal:      Palpations: Abdomen is soft.      Tenderness: There is no abdominal tenderness.   Musculoskeletal:      Right lower leg: Edema present.      Left lower leg: Edema present.   Skin:     General:  Skin is warm and dry.      Capillary Refill: Capillary refill takes less than 2 seconds.      Coloration: Skin is pale.   Neurological:      Mental Status: She is alert and oriented to person, place, and time.      Motor: Weakness and tremor present.   Psychiatric:         Mood and Affect: Mood is anxious.         Behavior: Behavior is cooperative.       Significant Labs: All pertinent labs within the past 24 hours have been reviewed.  CBC:   Recent Labs   Lab 02/13/23  1702 02/14/23  0500   WBC 53.34* 30.37*   HGB 11.6* 8.2*   HCT 37.2 25.9*   * 460*     CMP:   Recent Labs   Lab 02/13/23  1702 02/14/23  0500    132*   K 3.8 3.1*   CL 90* 96   CO2 18* 19*   GLU 97 108   BUN 12 12   CREATININE 0.8 0.7   CALCIUM 9.4 7.7*   PROT 7.3 4.8*   ALBUMIN 3.3* 2.3*   BILITOT 0.4 0.2   ALKPHOS 143* 89   AST 66* 40   ALT 44 26   ANIONGAP 28* 17*       Significant Imaging: I have reviewed all pertinent imaging results/findings within the past 24 hours.

## 2023-02-14 NOTE — CONSULTS
[facility] - Telemetry ([facility])  Adult Nutrition  Consult Note    SUMMARY     Recommendations    Recommendation/Intervention:   1. Recommend for Pt to continue Regular Diet.   2.Recommend for Pt to receive Landen BID to assist with wound healing. 3. Recommend for Pt to be initiated onto Bowel regiment; LBM 2/11(3 days). 4.Recommend for Pt weight to be monitored weekly.  Goals:   1. Pt will continue consuming >75% of meals and beverages.   2. Pt will receive Landen BID by RD follow-up.   3. Pt will receive Bowel Regiment by RD Follow-up.  Nutrition Goal Status: new  Communication of RD Recs: Other (Comment); POC; Sticky note)  Assessment and Plan      Nutrition Problem  Inadequate protein intake     Related to (etiology):   Increased demand for protein    Signs and Symptoms (as evidenced by):   Wound healing     Interventions(treatment strategy):  Recommendation/Intervention:   1. Recommend for Pt to continue Regular Diet.   2.Recommend for Pt to receive Landen BID to assist with wound healing. 3. Recommend for Pt to be initiated onto Bowel regiment; LBM 2/11(3 days).   4.Recommend for Pt weight to be monitored weekly.  5. Collaboration of care with medical providers.   Goals:   1. Pt will continue consuming >75% of meals and beverages.   2. Pt will receive Landen BID by RD follow-up.   3. Pt will receive Bowel Regiment by RD Follow-up.    Nutrition Diagnosis Status:   New      Malnutrition Assessment                                       Reason for Assessment    Reason For Assessment: consult  Diagnosis: other (see comments) (No active principal problem)  Relevant Medical History: Anemia,CLL (chronic lymphocytic leukemia),Postablative hypothyroidism,COPD, very severe (Chronic)  Interdisciplinary Rounds: did not attend  General Information Comments: Pt. states appetite is good and consuming 75%-100% of meals. Pt was currently experencing nausea without V/D. Pt. denies difficulties chewing/swallowing or abdominal  "pain/distension.Pt wt is stable per chart review.  LBM 2/11. Pt. has alter skin intergirty located at the Coccyx with partial thickness loss. NFPE was not perform Pt appears well nourished. RD will continue to monitor.  Nutrition Discharge Planning: Adult Regular Diet    Nutrition Risk Screen    Nutrition Risk Screen: large or nonhealing wound, burn or pressure injury    Nutrition/Diet History    Spiritual, Cultural Beliefs, Cheondoism Practices, Values that Affect Care: no  Food Allergies: NKFA  Factors Affecting Nutritional Intake: None identified at this time    Anthropometrics    Temp: 98.1 °F (36.7 °C)  Height Method: Stated  Height: 5' 4" (162.6 cm)  Height (inches): 64 in  Weight Method: Bed Scale  Weight: 56.8 kg (125 lb 3.5 oz)  Weight (lb): 125.22 lb  Ideal Body Weight (IBW), Female: 120 lb  % Ideal Body Weight, Female (lb): 104.35 %  BMI (Calculated): 21.5     Wt Readings from Last 15 Encounters:   02/14/23 56.8 kg (125 lb 3.5 oz)   11/08/22 59.5 kg (131 lb 2.8 oz)   05/03/21 61.1 kg (134 lb 12.8 oz)   05/02/16 78 kg (172 lb)   04/24/16 75.3 kg (166 lb 1.6 oz)   12/29/15 77.4 kg (170 lb 10.2 oz)   12/29/15 77.4 kg (170 lb 10.2 oz)   11/20/15 77.1 kg (170 lb)   04/28/15 64.4 kg (142 lb)   07/11/14 69.4 kg (153 lb)   07/11/14 69.4 kg (153 lb)   05/20/14 66.7 kg (147 lb)   01/03/14 72.6 kg (160 lb)   01/03/14 72.6 kg (160 lb)   12/20/13 70.8 kg (156 lb)       Lab/Procedures/Meds    Pertinent Labs Reviewed: reviewed  Pertinent Labs Comments: Na 132 (L), K+ 3.1  Pertinent Medications Reviewed: reviewed  Lab Results   Component Value Date     (L) 02/14/2023    K 3.1 (L) 02/14/2023    CL 96 02/14/2023    CO2 19 (L) 02/14/2023    BUN 12 02/14/2023    CREATININE 0.7 02/14/2023    CALCIUM 7.7 (L) 02/14/2023    ANIONGAP 17 (H) 02/14/2023    EGFRNORACEVR >60 02/14/2023   Scheduled Meds:   arformoteroL  15 mcg Nebulization BID    busPIRone  7.5 mg Oral BID    calcitRIOL  0.25 mcg Oral Daily    diltiaZEM  120 mg " Oral BID    enoxaparin  40 mg Subcutaneous Daily    gabapentin  400 mg Oral QID    ipratropium  0.5 mg Nebulization Q6H    levothyroxine  125 mcg Oral Before breakfast    magnesium oxide  400 mg Oral BID    meropenem (MERREM) IVPB  500 mg Intravenous Q6H    potassium bicarbonate  25 mEq Oral BID    tiZANidine  4 mg Oral TID     Continuous Infusions:  PRN Meds:.acetaminophen, acetaminophen, albuterol-ipratropium, aluminum-magnesium hydroxide-simethicone, dextrose 10%, dextrose 10%, glucagon (human recombinant), glucose, glucose, HYDROcodone-acetaminophen, melatonin, morphine, naloxone, ondansetron, promethazine, senna-docusate 8.6-50 mg, sodium chloride 0.9%          Estimated/Assessed Needs    Weight Used For Calorie Calculations: 56.8 kg (125 lb 3.5 oz)  Energy Calorie Requirements (kcal): 3382-8530 (25-35Kcal/kg (COPD)  Energy Need Method: Kcal/kg  Protein Requirements: 68-85 (1.2-1.5g/kg)  Weight Used For Protein Calculations: 56.8 kg (125 lb 3.5 oz)        RDA Method (mL): 1420  CHO Requirement: 178-249g      Nutrition Prescription Ordered    Current Diet Order: Adult Regular    Evaluation of Received Nutrient/Fluid Intake    I/O: 2/14; -9915  Energy Calories Required: meeting needs  Protein Required: meeting needs  Fluid Required: meeting needs  Tolerance: tolerating  % Intake of Estimated Energy Needs: 75 - 100 %  % Meal Intake: 75 - 100 %    Nutrition Risk    Level of Risk/Frequency of Follow-up: low       Monitor and Evaluation    Food and Nutrient Intake: energy intake, food and beverage intake  Food and Nutrient Adminstration: diet order  Knowledge/Beliefs/Attitudes: food and nutrition knowledge/skill, beliefs and attitudes  Anthropometric Measurements: weight, weight change, body mass index  Biochemical Data, Medical Tests and Procedures: electrolyte and renal panel, gastrointestinal profile, glucose/endocrine profile, inflammatory profile, lipid profile  Nutrition-Focused Physical Findings: overall  appearance       Nutrition Follow-Up    RD Follow-up?: Yes  Garrett Maher, Dietetic Intern

## 2023-02-14 NOTE — PLAN OF CARE
SEE EVAL FOR DETAILS. PT DISPLAYED DEFICITS WITH ADL'S SKILLS, DECREASE B UE STRENGTH/ENDURANCE AS WAS AS DEFICITS WITH TRANSFERS/ MOBILITY. RECOMMEND :SNF

## 2023-02-14 NOTE — ASSESSMENT & PLAN NOTE
Appears above last reported baseline with H/H currently measuring 11.6/37.2 without evidence of active bleeding noted.  Plan:  -continue to monitor  -type/screen, transfuse as needed

## 2023-02-14 NOTE — ASSESSMENT & PLAN NOTE
Appears above last reported baseline with H/H currently measuring 11.6/37.2 without evidence of active bleeding noted.  Plan:  -continue to monitor  -type/screen, transfuse as needed    2/14  Hb/hct trending down  No overt signs or symptoms of bleeding  Likely dilutional   Iron studies pending

## 2023-02-14 NOTE — SUBJECTIVE & OBJECTIVE
Past Medical History:   Diagnosis Date    Arthritis     Chronic back pain     COPD (chronic obstructive pulmonary disease)     COPD with acute exacerbation 2016    Pneumonia     SOB (shortness of breath)        Past Surgical History:   Procedure Laterality Date    BACK SURGERY       SECTION      TONSILLECTOMY      WISDOM TOOTH EXTRACTION         Review of patient's allergies indicates:   Allergen Reactions    Levofloxacin     Nitrofurantoin monohyd/m-cryst Other (See Comments)    Penicillins Other (See Comments)    Ciprofloxacin Itching and Rash       No current facility-administered medications on file prior to encounter.     Current Outpatient Medications on File Prior to Encounter   Medication Sig    ADVAIR DISKUS 250-50 mcg/dose diskus inhaler USE 1 INHALATION TWICE DAILY **THANK YOU**    albuterol (PROAIR HFA) 90 mcg/actuation inhaler INHALE TWO PUFFS EVERY 4 TO 6 HOURS **THANK YOU**    albuterol (PROVENTIL) 2.5 mg /3 mL (0.083 %) nebulizer solution INHALE 1 VIAL VIA NEBULIZER EVERY 4 HOURS AS NEEDED **THANK YOU**    busPIRone (BUSPAR) 7.5 MG tablet Take 7.5 mg by mouth 2 (two) times a day.    calcitRIOL (ROCALTROL) 0.25 MCG Cap Take 0.25 mcg by mouth once daily.    calcium carbonate-magnesium hydroxide (ROLAIDS) 550-110 mg Chew Take 2,000 mg by mouth 3 (three) times daily as needed.    diltiaZEM (CARDIZEM) 120 MG tablet Take 120 mg by mouth 2 (two) times a day.    gabapentin (NEURONTIN) 400 MG capsule Take by mouth 4 (four) times daily.     levothyroxine (SYNTHROID) 125 MCG tablet Take 125 mcg by mouth before breakfast.    montelukast (SINGULAIR) 10 mg tablet Take 10 mg by mouth once daily.    tiotropium (SPIRIVA) 18 mcg inhalation capsule Inhale 18 mcg into the lungs once daily. Controller    tizanidine (ZANAFLEX) 4 MG tablet Take 1 tablet by mouth 3 (three) times daily.     fluticasone (FLONASE) 50 mcg/actuation nasal spray 2 sprays by Each Nare route 2 (two) times daily.     tiotropium-olodaterol (STIOLTO RESPIMAT) 2.5-2.5 mcg/actuation Mist Inhale 2 puffs into the lungs once daily.     Family History       Problem Relation (Age of Onset)    Cancer Father    Diabetes Maternal Grandmother          Tobacco Use    Smoking status: Former     Packs/day: 1.00     Years: 30.00     Pack years: 30.00     Types: Cigarettes     Quit date: 1/3/2012     Years since quittin.1    Smokeless tobacco: Not on file   Substance and Sexual Activity    Alcohol use: No    Drug use: No    Sexual activity: Never     Birth control/protection: None     Review of Systems   All other systems reviewed and are negative.  Objective:     Vital Signs (Most Recent):  Temp: 97.6 °F (36.4 °C) (23 2349)  Pulse: 102 (23 002)  Resp: 20 (23)  BP: 113/64 (239)  SpO2: (!) 94 % (23)   Vital Signs (24h Range):  Temp:  [97.6 °F (36.4 °C)-99.2 °F (37.3 °C)] 97.6 °F (36.4 °C)  Pulse:  [102-132] 102  Resp:  [15-28] 20  SpO2:  [90 %-96 %] 94 %  BP: ()/(55-82) 113/64     Weight: 56.8 kg (125 lb 3.5 oz)  Body mass index is 21.49 kg/m².    Physical Exam  Vitals reviewed.   Constitutional:       General: She is not in acute distress.     Appearance: Normal appearance. She is normal weight. She is not ill-appearing, toxic-appearing or diaphoretic.   HENT:      Head: Normocephalic and atraumatic.      Right Ear: External ear normal.      Left Ear: External ear normal.      Nose: Nose normal. No congestion or rhinorrhea.      Mouth/Throat:      Mouth: Mucous membranes are moist.      Pharynx: Oropharynx is clear. No oropharyngeal exudate or posterior oropharyngeal erythema.   Eyes:      General: No scleral icterus.     Extraocular Movements: Extraocular movements intact.      Conjunctiva/sclera: Conjunctivae normal.      Pupils: Pupils are equal, round, and reactive to light.   Neck:      Vascular: No carotid bruit.   Cardiovascular:      Rate and Rhythm: Regular rhythm. Tachycardia  present.      Pulses: Normal pulses.      Heart sounds: Normal heart sounds. No murmur heard.    No friction rub. No gallop.   Pulmonary:      Effort: Pulmonary effort is normal. No respiratory distress.      Breath sounds: Normal breath sounds. No stridor. No wheezing, rhonchi or rales.      Comments: Slightly diminished breath sounds noted throughout right middle and right lower lung fields without evidence of wheezes, rales, rhonchi noted.  Patient currently in no acute distress speaking in full sentences without use of accessory muscles noted.  Chest:      Chest wall: No tenderness.   Abdominal:      General: Abdomen is flat. Bowel sounds are normal. There is no distension.      Palpations: Abdomen is soft.      Tenderness: There is no abdominal tenderness. There is no right CVA tenderness, left CVA tenderness, guarding or rebound.      Hernia: No hernia is present.   Musculoskeletal:         General: No swelling, tenderness, deformity or signs of injury. Normal range of motion.      Cervical back: Normal range of motion and neck supple. No rigidity or tenderness.      Right lower leg: No edema.      Left lower leg: No edema.   Lymphadenopathy:      Cervical: No cervical adenopathy.   Skin:     General: Skin is warm and dry.      Capillary Refill: Capillary refill takes less than 2 seconds.      Coloration: Skin is not jaundiced or pale.      Findings: No bruising, erythema, lesion or rash.   Neurological:      General: No focal deficit present.      Mental Status: She is alert and oriented to person, place, and time. Mental status is at baseline.      Cranial Nerves: No cranial nerve deficit.      Sensory: No sensory deficit.      Motor: No weakness.      Coordination: Coordination normal.   Psychiatric:         Mood and Affect: Mood normal.         Behavior: Behavior normal.         Thought Content: Thought content normal.         Judgment: Judgment normal.         CRANIAL NERVES     CN III, IV, VI   Pupils  are equal, round, and reactive to light.     Significant Labs: All pertinent labs within the past 24 hours have been reviewed.    Significant Imaging: I have reviewed all pertinent imaging results/findings within the past 24 hours.    LABS:  Recent Results (from the past 24 hour(s))   COVID-19 Rapid Screening    Collection Time: 02/13/23  4:45 PM   Result Value Ref Range    SARS-CoV-2 RNA, Amplification, Qual Negative Negative   Influenza A & B by Molecular    Collection Time: 02/13/23  4:46 PM    Specimen: Nasopharyngeal Swab   Result Value Ref Range    Influenza A, Molecular Negative Negative    Influenza B, Molecular Negative Negative    Flu A & B Source Nasal swab    CBC auto differential    Collection Time: 02/13/23  5:02 PM   Result Value Ref Range    WBC 53.34 (HH) 3.90 - 12.70 K/uL    RBC 4.41 4.00 - 5.40 M/uL    Hemoglobin 11.6 (L) 12.0 - 16.0 g/dL    Hematocrit 37.2 37.0 - 48.5 %    MCV 84 82 - 98 fL    MCH 26.3 (L) 27.0 - 31.0 pg    MCHC 31.2 (L) 32.0 - 36.0 g/dL    RDW 14.4 11.5 - 14.5 %    Platelets 794 (H) 150 - 450 K/uL    MPV 9.5 9.2 - 12.9 fL    Immature Granulocytes CANCELED 0.0 - 0.5 %    Immature Grans (Abs) CANCELED 0.00 - 0.04 K/uL    nRBC 0 0 /100 WBC    Gran % 55.0 38.0 - 73.0 %    Lymph % 40.0 18.0 - 48.0 %    Mono % 5.0 4.0 - 15.0 %    Eosinophil % 0.0 0.0 - 8.0 %    Basophil % 0.0 0.0 - 1.9 %    Platelet Estimate Increased (A)     Ovalocytes Occasional     Jessica Cells Moderate     Smudge Cells Present     Differential Method Manual    Comprehensive metabolic panel    Collection Time: 02/13/23  5:02 PM   Result Value Ref Range    Sodium 136 136 - 145 mmol/L    Potassium 3.8 3.5 - 5.1 mmol/L    Chloride 90 (L) 95 - 110 mmol/L    CO2 18 (L) 23 - 29 mmol/L    Glucose 97 70 - 110 mg/dL    BUN 12 8 - 23 mg/dL    Creatinine 0.8 0.5 - 1.4 mg/dL    Calcium 9.4 8.7 - 10.5 mg/dL    Total Protein 7.3 6.0 - 8.4 g/dL    Albumin 3.3 (L) 3.5 - 5.2 g/dL    Total Bilirubin 0.4 0.1 - 1.0 mg/dL    Alkaline  Phosphatase 143 (H) 55 - 135 U/L    AST 66 (H) 10 - 40 U/L    ALT 44 10 - 44 U/L    Anion Gap 28 (H) 8 - 16 mmol/L    eGFR >60 >60 mL/min/1.73 m^2   Lactic acid, plasma    Collection Time: 02/13/23  5:02 PM   Result Value Ref Range    Lactate (Lactic Acid) 2.7 (H) 0.5 - 2.2 mmol/L   Procalcitonin    Collection Time: 02/13/23 10:39 PM   Result Value Ref Range    Procalcitonin 259.43 (H) <0.25 ng/mL   POCT glucose    Collection Time: 02/13/23 11:01 PM   Result Value Ref Range    POCT Glucose 125 (H) 70 - 110 mg/dL   Urinalysis, Reflex to Urine Culture Urine, Catheterized    Collection Time: 02/14/23  3:29 AM    Specimen: Urine   Result Value Ref Range    Specimen UA Urine, Catheterized     Color, UA Orange (A) Yellow, Straw, Jeannine    Appearance, UA Clear Clear    pH, UA 6.0 5.0 - 8.0    Specific Gravity, UA 1.020 1.005 - 1.030    Protein, UA Trace (A) Negative    Glucose, UA Negative Negative    Ketones, UA 1+ (A) Negative    Bilirubin (UA) Negative Negative    Occult Blood UA Negative Negative    Nitrite, UA Positive (A) Negative    Urobilinogen, UA Negative <2.0 EU/dL    Leukocytes, UA Negative Negative   Urinalysis Microscopic    Collection Time: 02/14/23  3:29 AM   Result Value Ref Range    RBC, UA 1 0 - 4 /hpf    Bacteria None None-Occ /hpf    Hyaline Casts, UA 1 0-1/lpf /lpf    Microscopic Comment SEE COMMENT        RADIOLOGY  X-Ray Chest AP Portable    Result Date: 2/13/2023  EXAMINATION: XR CHEST AP PORTABLE CLINICAL HISTORY: dyspnea; TECHNIQUE: Single frontal view of the chest was performed. COMPARISON: Prior FINDINGS: Deformity of the posterolateral right ribs.  Curvilinear opacities suggestive of atelectasis or scarring.  Increase nodular opacities in the right midlung zone may relate to developing new right mid and right lower lung zone pneumonia. Bones are intact.     As above Electronically signed by: John Garcia Date:    02/13/2023 Time:    18:09      EKG    MICROBIOLOGY    MDM

## 2023-02-14 NOTE — PROGRESS NOTES
[facility] Medicine  Progress Note    Patient Name: Christine Horner  MRN: [id number]  Patient Class: IP- Inpatient   Admission Date: 2/13/2023  Length of Stay: 1 days  Attending Physician: Ike Allen MD  Primary Care Provider: Eloy Hdez MD        Subjective:     Principal Problem:<principal problem not specified>        HPI:  Christine Horner is a 63 y.o. female with a PMH  has a past medical history of Arthritis, Chronic back pain, COPD (chronic obstructive pulmonary disease), COPD with acute exacerbation (04/24/2016), Pneumonia, and SOB (shortness of breath). who presented to the ED complaining of progressively worsening shortness a breath x1 week duration with acute worsening yesterday.  Patient reports being on chronic home O2 of 3 L via nasal cannula now requiring up titration of supplemental oxygen.  Patient denied endorsing any fever, chills, sweats, chest pain, abdominal pain, dysuria, melena, hematochezia, diarrhea, or onset neurological deficits however did report intermittent nausea with vomiting, dyspnea on exertion, bilateral for chest pain upon deep inspiration described as sharp/stabbing in nature, rated 8/10 in severity, in addition to dyspnea worsened upon lying flat.  She reported no other known alleviating or aggravating factors and reported all other review of systems negative except as noted above.  Patient also reported DuoNebs administered at home had little to no relief in symptoms prompting her to come to the ED to be further evaluated.  Patient also reported similar symptoms occurred when she was diagnosed with pneumonia requiring hospitalization and IV antibiotics.  Hospital Medicine consulted by ED staff for admission for continued treatment of pneumonia in setting of acute on chronic hypoxemic respiratory failure.     PCP: Eloy Hdez        Overview/Hospital Course:  2/14 endorses symptom improvement with dyspnea, appetite and fatigue.  Reports palpitations upon standing. Continue scheduled breathing treatments, intravenous antibiotic(s). Add acappella and incentive spirometer. Smoking cessation approximately 10 years ago. [facility] cll, followed by Dr. Prieto      Interval History: See hospital course for today      Review of Systems   Constitutional:  Positive for activity change, appetite change (improved), fatigue (off and on since weekend) and fever.   HENT:  Negative for trouble swallowing.    Respiratory:  Positive for cough and shortness of breath.    Cardiovascular:  Positive for palpitations (upon standing).   Gastrointestinal:  Negative for abdominal pain, diarrhea, nausea and vomiting.   Allergic/Immunologic: Positive for immunocompromised state.   Neurological:  Positive for tremors and weakness.   Psychiatric/Behavioral:  The patient is nervous/anxious.    Objective:     Vital Signs (Most Recent):  Temp: 97.5 °F (36.4 °C) (02/14/23 0725)  Pulse: 87 (02/14/23 0725)  Resp: 19 (02/14/23 0743)  BP: (!) 117/58 (02/14/23 0725)  SpO2: 95 % (02/14/23 0725)   Vital Signs (24h Range):  Temp:  [97.5 °F (36.4 °C)-99.2 °F (37.3 °C)] 97.5 °F (36.4 °C)  Pulse:  [] 87  Resp:  [15-28] 19  SpO2:  [90 %-96 %] 95 %  BP: ()/(50-82) 117/58     Weight: 56.8 kg (125 lb 3.5 oz)  Body mass index is 21.49 kg/m².    Intake/Output Summary (Last 24 hours) at 2/14/2023 0905  Last data filed at 2/14/2023 0353  Gross per 24 hour   Intake --   Output 9115 ml   Net -9115 ml      Physical Exam  Vitals and nursing note reviewed.   Constitutional:       General: She is not in acute distress.     Appearance: She is normal weight. She is ill-appearing. She is not toxic-appearing.      Interventions: Nasal cannula in place.   HENT:      Head: Normocephalic and atraumatic.   Cardiovascular:      Rate and Rhythm: Normal rate.   Pulmonary:      Effort: Tachypnea and respiratory distress present.      Breath sounds: Decreased air movement present.      Comments: Tachypnea  with conversing  Abdominal:      Palpations: Abdomen is soft.      Tenderness: There is no abdominal tenderness.   Musculoskeletal:      Right lower leg: Edema present.      Left lower leg: Edema present.   Skin:     General: Skin is warm and dry.      Capillary Refill: Capillary refill takes less than 2 seconds.      Coloration: Skin is pale.   Neurological:      Mental Status: She is alert and oriented to person, place, and time.      Motor: Weakness and tremor present.   Psychiatric:         Mood and Affect: Mood is anxious.         Behavior: Behavior is cooperative.       Significant Labs: All pertinent labs within the past 24 hours have been reviewed.  CBC:   Recent Labs   Lab 02/13/23  1702 02/14/23  0500   WBC 53.34* 30.37*   HGB 11.6* 8.2*   HCT 37.2 25.9*   * 460*     CMP:   Recent Labs   Lab 02/13/23 1702 02/14/23  0500    132*   K 3.8 3.1*   CL 90* 96   CO2 18* 19*   GLU 97 108   BUN 12 12   CREATININE 0.8 0.7   CALCIUM 9.4 7.7*   PROT 7.3 4.8*   ALBUMIN 3.3* 2.3*   BILITOT 0.4 0.2   ALKPHOS 143* 89   AST 66* 40   ALT 44 26   ANIONGAP 28* 17*       Significant Imaging: I have reviewed all pertinent imaging results/findings within the past 24 hours.      Assessment/Plan:      Anemia  Appears above last reported baseline with H/H currently measuring 11.6/37.2 without evidence of active bleeding noted.  Plan:  -continue to monitor  -type/screen, transfuse as needed    2/14  Hb/hct trending down  No overt signs or symptoms of bleeding  Likely dilutional   Iron studies pending    CLL (chronic lymphocytic leukemia)  Patient currently follows Dr. Prieto from hem/onc.  CBC currently showing WBC 53.34, H/H 11.6/37.2, platelets 794.  Hematology consult by ED staff with recommendations to treat for pneumonia and order quantitative immunoglobulins.  Plan:  -continued treatment of pneumonia noted above  -f/u immunoglobulins  -f/u Heme-Onc outpatient       Postablative hypothyroidism  Continue home  medication(s)   Follow up outpatient       Paroxysmal SVT (supraventricular tachycardia)  Currently on diltiazem outpatient.  Plan:  -telemetry  -continue home medication, titrate as needed      Acute on chronic respiratory failure with hypoxia  Patient with Hypoxic Respiratory failure which is Acute on chronic.  she is on home oxygen at 3 LPM. Supplemental oxygen was provided and noted- Oxygen Concentration (%):  [36] 36.   Signs/symptoms of respiratory failure include- tachypnea and increased work of breathing. Contributing diagnoses includes - COPD, Interstitial lung disease, Pleural effusion and Pneumonia Labs and images were reviewed. Patient Has not had a recent ABG but does have evidence of anion gap metabolic acidosis, elevated lactic acid and, and decreased bicarb noted on labs. Will treat underlying causes and adjust management of respiratory failure as follows-   Plan:  -continue antibiotics  -DuoNebs p.r.n.  -titrate oxygen therapy as needed  -incentive spirometry  -f/u ABG    Continue current regimen      Centrilobular emphysema  Smoking cessation 10 years ago  On baseline supplemental oxygen 3L       COPD, very severe  Initiated on intravenous cefepime  Recent history of pseudomonas pneumonia in 11/2022  Intermediate sensitivity to cefepime   Switch to merrem  Allergy noted to penicillin   penfast 0, very low risk  acapella and incentive spirometer       VTE Risk Mitigation (From admission, onward)         Ordered     enoxaparin injection 40 mg  Daily         02/13/23 2052     IP VTE HIGH RISK PATIENT  Once         02/13/23 2052     Place sequential compression device  Until discontinued         02/13/23 2052                Discharge Planning   MARK:      Code Status: Full Code   Is the patient medically ready for discharge?:     Reason for patient still in hospital (select all that apply): Patient new problem, Patient trending condition, Laboratory test, Treatment, Imaging, Consult recommendations, PT  / OT recommendations and Pending disposition                     Ike Allen MD  Department of Hospital Medicine   [facility] - Telemetry ([facility])

## 2023-02-14 NOTE — ASSESSMENT & PLAN NOTE
Patient with Hypoxic Respiratory failure which is Acute on chronic.  she is on home oxygen at 3 LPM. Supplemental oxygen was provided and noted- Oxygen Concentration (%):  [36] 36.   Signs/symptoms of respiratory failure include- tachypnea and increased work of breathing. Contributing diagnoses includes - COPD, Interstitial lung disease, Pleural effusion and Pneumonia Labs and images were reviewed. Patient Has not had a recent ABG. Will treat underlying causes and adjust management of respiratory failure as follows-   Plan:  -continue antibiotics  -Bhupendra p.r.n.  -titrate oxygen therapy as needed  -incentive spirometry

## 2023-02-14 NOTE — ASSESSMENT & PLAN NOTE
Patient with Hypoxic Respiratory failure which is Acute on chronic.  she is on home oxygen at 3 LPM. Supplemental oxygen was provided and noted- Oxygen Concentration (%):  [36] 36.   Signs/symptoms of respiratory failure include- tachypnea and increased work of breathing. Contributing diagnoses includes - COPD, Interstitial lung disease, Pleural effusion and Pneumonia Labs and images were reviewed. Patient Has not had a recent ABG but does have evidence of anion gap metabolic acidosis, elevated lactic acid and, and decreased bicarb noted on labs. Will treat underlying causes and adjust management of respiratory failure as follows-   Plan:  -continue antibiotics  -DuoNebs p.r.n.  -titrate oxygen therapy as needed  -incentive spirometry  -f/u ABG

## 2023-02-14 NOTE — PLAN OF CARE
Problem: Adult Inpatient Plan of Care  Goal: Plan of Care Review  Outcome: Ongoing, Progressing  Goal: Patient-Specific Goal (Individualized)  Outcome: Ongoing, Progressing  Goal: Absence of Hospital-Acquired Illness or Injury  Outcome: Ongoing, Progressing  Goal: Optimal Comfort and Wellbeing  Outcome: Ongoing, Progressing  Goal: Readiness for Transition of Care  Outcome: Ongoing, Progressing     Problem: Fluid Imbalance (Pneumonia)  Goal: Fluid Balance  Outcome: Ongoing, Progressing     Problem: Infection (Pneumonia)  Goal: Resolution of Infection Signs and Symptoms  Outcome: Ongoing, Progressing     Problem: Respiratory Compromise (Pneumonia)  Goal: Effective Oxygenation and Ventilation  Outcome: Ongoing, Progressing     Problem: Impaired Wound Healing  Goal: Optimal Wound Healing  Outcome: Ongoing, Progressing     Problem: Infection  Goal: Absence of Infection Signs and Symptoms  Outcome: Ongoing, Progressing     Problem: Skin Injury Risk Increased  Goal: Skin Health and Integrity  Outcome: Ongoing, Progressing

## 2023-02-14 NOTE — ASSESSMENT & PLAN NOTE
Initiated on intravenous cefepime  Recent history of pseudomonas pneumonia in 11/2022  Intermediate sensitivity to cefepime   Switch to merrem  Allergy noted to penicillin   penfast 0, very low risk  acapella and incentive spirometer

## 2023-02-14 NOTE — ASSESSMENT & PLAN NOTE
Patient currently follows Dr. Prieto from hem/onc.  CBC currently showing WBC 53.34, H/H 11.6/37.2, platelets 794.  Hematology consult by ED staff with recommendations to treat for pneumonia and order quantitative immunoglobulins.  Plan:  -continued treatment of pneumonia noted above  -f/u immunoglobulins  -f/u Heme-Onc

## 2023-02-14 NOTE — PLAN OF CARE
[facility] - Telemetry (Hospital)  Initial Discharge Assessment    Anticipated DC dispo: SNF vs HH    Prior Level of Function: Lives at home with spouse,needs assist with ADLS and uses DME for ambulation  PCP: SREE Otto ([facility])     Comments: SW met with patient at the bedside to complete discharge assessment. SW discussed SNF recommendations. Pt states that she's previously attempted placement and was informed she does not have insurance benefits for this level of care. SW offered to send SNF referrals and have a facility confirm SNF bed days. Patient agreeable and would like placement if possible. SW discussed alternative plan for home health, if SNF is not possible. SNF placement process explained and pt agreeable to referrals being sent and choosing facility from accepting.     SW to continue following    Pt's whiteboard updated to reflect discharge disposition and SW contact information. SW to continue following.     Primary Care Provider: Eloy Hdez MD    Admission Diagnosis: Dyspnea [R06.00]  Personal history of CLL (chronic lymphocytic leukemia) [Z85.6]  Leukocytosis, unspecified type [D72.829]  Pneumonia due to infectious organism, unspecified laterality, unspecified part of lung [J18.9]    Admission Date: 2/13/2023  Expected Discharge Date:     Discharge Barriers Identified: None    Payor: BLUE CROSS BLUE SHIELD / Plan: [place] PPO / Product Type: PPO /     Extended Emergency Contact Information  Primary Emergency Contact: Chu Horner  Address: [address] [place] [address] . .           [place], [place] [id number] [place] [address] of [place]  Home Phone: [phone]  Mobile Phone: [phone]  Relation: Spouse    Discharge Plan A: Skilled Nursing Facility  Discharge Plan B: Home Health      [facility] [place] - [address] [id number] [address] [address]  Phone: [phone] Fax: [phone]    [facility] [place]   [address] [facility] [address]  [address] [facility] [address]  [facility] [address]  Phone: [phone] Fax: [phone]      Initial Assessment (most recent)       Adult Discharge Assessment - 02/14/23 1536          Discharge Assessment    Assessment Type Discharge Planning Assessment     Confirmed/corrected address, phone number and insurance Yes     Confirmed Demographics Correct on Facesheet     Source of Information patient     Communicated MARK with patient/caregiver Date not available/Unable to determine     Reason For Admission SOB     People in Home spouse     Facility Arrived From: Home     Do you expect to return to your current living situation? Yes     Do you have help at home or someone to help you manage your care at home? Yes     Who are your caregiver(s) and their phone number(s)? Pts spouse, Chu     Prior to hospitilization cognitive status: Alert/Oriented     Current cognitive status: Alert/Oriented     Walking or Climbing Stairs ambulation difficulty, requires equipment     Readmission within 30 days? No     Patient currently being followed by outpatient case management? No     Do you currently have service(s) that help you manage your care at home? No   [facility] in the past    Do you take prescription medications? Yes     Do you have prescription coverage? Yes     Do you have any problems affording any of your prescribed medications? No     Is the patient taking medications as prescribed? yes     Who is going to help you get home at discharge? TBD by discharge disposition     How do you get to doctors appointments? family or friend will provide     Are you on dialysis? No     Do you take coumadin? No     Discharge Plan A Skilled Nursing Facility     Discharge Plan B Home Health     DME Needed Upon Discharge  none     Discharge Plan discussed with: Patient     Discharge Barriers Identified None

## 2023-02-14 NOTE — HPI
Christine Horner is a 63 y.o. female with a PMH  has a past medical history of Arthritis, Chronic back pain, COPD (chronic obstructive pulmonary disease), COPD with acute exacerbation (04/24/2016), Pneumonia, and SOB (shortness of breath). who presented to the ED complaining of progressively worsening shortness a breath x1 week duration with acute worsening yesterday.  Patient reports being on chronic home O2 of 3 L via nasal cannula now requiring up titration of supplemental oxygen.  Patient denied endorsing any fever, chills, sweats, chest pain, abdominal pain, dysuria, melena, hematochezia, diarrhea, or onset neurological deficits however did report intermittent nausea with vomiting, dyspnea on exertion, bilateral for chest pain upon deep inspiration described as sharp/stabbing in nature, rated 8/10 in severity, in addition to dyspnea worsened upon lying flat.  She reported no other known alleviating or aggravating factors and reported all other review of systems negative except as noted above.  Patient also reported DuoNebs administered at home had little to no relief in symptoms prompting her to come to the ED to be further evaluated.  Patient also reported similar symptoms occurred when she was diagnosed with pneumonia requiring hospitalization and IV antibiotics.  Hospital Medicine consulted by ED staff for admission for continued treatment of pneumonia in setting of acute on chronic hypoxemic respiratory failure.     PCP: Eloy Hdez

## 2023-02-14 NOTE — CARE UPDATE
Patient reports concerns with dysphagia, nausea  Denies vomiting  Denies concerns with throat swelling but notes hoarse voice    Allergy to penicillin but unknown allergy    Respiratory status stable, not requiring increase oxygen support  Wheezing  No pharyngeal edema or erythema  Anxious     Penfast 0, low risk for allergy  Continue intravenous Merrem  If symptoms worsen, discontinue Merrem   Speech evaluation   Refuses soft diet   Landen added per nutrition recommendations     Physical/occupational therapy recommending skilled nursing facility placement

## 2023-02-14 NOTE — PLAN OF CARE
Nutrition Recommendation/Intervention: 2/14/23   1. Recommend for Pt to continue Regular Diet.   2.Recommend for Pt to receive Landen BID to assist with wound healing. 3. Recommend for Pt to be initiated onto Bowel regiment; LBM 2/11(3 days). 4.Recommend for Pt weight to be monitored weekly.  Goals:   1. Pt will continue consuming >75% of meals and beverages.   2. Pt will receive Landen BID by RD follow-up.   3. Pt will receive Bowel Regiment by RD Follow-up.    Garrett Maher Dietetic Intern

## 2023-02-15 LAB
ALBUMIN SERPL BCP-MCNC: 2.8 G/DL (ref 3.5–5.2)
ALP SERPL-CCNC: 129 U/L (ref 55–135)
ALT SERPL W/O P-5'-P-CCNC: 26 U/L (ref 10–44)
ANION GAP SERPL CALC-SCNC: 13 MMOL/L (ref 8–16)
ANISOCYTOSIS BLD QL SMEAR: SLIGHT
AST SERPL-CCNC: 45 U/L (ref 10–40)
BASOPHILS NFR BLD: 1 % (ref 0–1.9)
BILIRUB SERPL-MCNC: 0.3 MG/DL (ref 0.1–1)
BUN SERPL-MCNC: 8 MG/DL (ref 8–23)
CALCIUM SERPL-MCNC: 8.4 MG/DL (ref 8.7–10.5)
CHLORIDE SERPL-SCNC: 99 MMOL/L (ref 95–110)
CO2 SERPL-SCNC: 26 MMOL/L (ref 23–29)
CREAT SERPL-MCNC: 0.7 MG/DL (ref 0.5–1.4)
DIFFERENTIAL METHOD: ABNORMAL
EOSINOPHIL NFR BLD: 6 % (ref 0–8)
ERYTHROCYTE [DISTWIDTH] IN BLOOD BY AUTOMATED COUNT: 14.5 % (ref 11.5–14.5)
EST. GFR  (NO RACE VARIABLE): >60 ML/MIN/1.73 M^2
GLUCOSE SERPL-MCNC: 82 MG/DL (ref 70–110)
HCT VFR BLD AUTO: 30.5 % (ref 37–48.5)
HGB BLD-MCNC: 9.2 G/DL (ref 12–16)
IMM GRANULOCYTES # BLD AUTO: ABNORMAL K/UL (ref 0–0.04)
IMM GRANULOCYTES NFR BLD AUTO: ABNORMAL % (ref 0–0.5)
LYMPHOCYTES NFR BLD: 23 % (ref 18–48)
MCH RBC QN AUTO: 26.8 PG (ref 27–31)
MCHC RBC AUTO-ENTMCNC: 30.2 G/DL (ref 32–36)
MCV RBC AUTO: 89 FL (ref 82–98)
MONOCYTES NFR BLD: 7 % (ref 4–15)
NEUTROPHILS NFR BLD: 61 % (ref 38–73)
NEUTS BAND NFR BLD MANUAL: 2 %
NRBC BLD-RTO: 0 /100 WBC
OVALOCYTES BLD QL SMEAR: ABNORMAL
PLATELET # BLD AUTO: 497 K/UL (ref 150–450)
PLATELET BLD QL SMEAR: ABNORMAL
PMV BLD AUTO: 9.4 FL (ref 9.2–12.9)
POIKILOCYTOSIS BLD QL SMEAR: ABNORMAL
POTASSIUM SERPL-SCNC: 3.9 MMOL/L (ref 3.5–5.1)
PROT SERPL-MCNC: 5.8 G/DL (ref 6–8.4)
RBC # BLD AUTO: 3.43 M/UL (ref 4–5.4)
SMUDGE CELLS BLD QL SMEAR: PRESENT
SODIUM SERPL-SCNC: 138 MMOL/L (ref 136–145)
TARGETS BLD QL SMEAR: ABNORMAL
WBC # BLD AUTO: 27.67 K/UL (ref 3.9–12.7)

## 2023-02-15 PROCEDURE — 97110 THERAPEUTIC EXERCISES: CPT

## 2023-02-15 PROCEDURE — 63600175 PHARM REV CODE 636 W HCPCS: Performed by: HOSPITALIST

## 2023-02-15 PROCEDURE — 36415 COLL VENOUS BLD VENIPUNCTURE: CPT | Performed by: HOSPITALIST

## 2023-02-15 PROCEDURE — 25000242 PHARM REV CODE 250 ALT 637 W/ HCPCS: Performed by: HOSPITALIST

## 2023-02-15 PROCEDURE — 63600175 PHARM REV CODE 636 W HCPCS: Mod: TB,JG | Performed by: FAMILY MEDICINE

## 2023-02-15 PROCEDURE — 97535 SELF CARE MNGMENT TRAINING: CPT

## 2023-02-15 PROCEDURE — 94664 DEMO&/EVAL PT USE INHALER: CPT

## 2023-02-15 PROCEDURE — 92611 MOTION FLUOROSCOPY/SWALLOW: CPT

## 2023-02-15 PROCEDURE — 94761 N-INVAS EAR/PLS OXIMETRY MLT: CPT

## 2023-02-15 PROCEDURE — 21400001 HC TELEMETRY ROOM

## 2023-02-15 PROCEDURE — 94799 UNLISTED PULMONARY SVC/PX: CPT

## 2023-02-15 PROCEDURE — 25000003 PHARM REV CODE 250: Performed by: HOSPITALIST

## 2023-02-15 PROCEDURE — 94640 AIRWAY INHALATION TREATMENT: CPT

## 2023-02-15 PROCEDURE — 27000221 HC OXYGEN, UP TO 24 HOURS

## 2023-02-15 PROCEDURE — 85007 BL SMEAR W/DIFF WBC COUNT: CPT | Performed by: HOSPITALIST

## 2023-02-15 PROCEDURE — 80053 COMPREHEN METABOLIC PANEL: CPT | Performed by: HOSPITALIST

## 2023-02-15 PROCEDURE — 92610 EVALUATE SWALLOWING FUNCTION: CPT

## 2023-02-15 PROCEDURE — 25000003 PHARM REV CODE 250: Performed by: FAMILY MEDICINE

## 2023-02-15 PROCEDURE — 97530 THERAPEUTIC ACTIVITIES: CPT

## 2023-02-15 PROCEDURE — 25500020 PHARM REV CODE 255: Performed by: FAMILY MEDICINE

## 2023-02-15 PROCEDURE — 99900035 HC TECH TIME PER 15 MIN (STAT)

## 2023-02-15 PROCEDURE — 85027 COMPLETE CBC AUTOMATED: CPT | Performed by: HOSPITALIST

## 2023-02-15 PROCEDURE — A9698 NON-RAD CONTRAST MATERIALNOC: HCPCS | Performed by: FAMILY MEDICINE

## 2023-02-15 RX ORDER — HYDROCODONE BITARTRATE AND ACETAMINOPHEN 5; 325 MG/1; MG/1
1 TABLET ORAL EVERY 6 HOURS PRN
Status: DISCONTINUED | OUTPATIENT
Start: 2023-02-15 | End: 2023-02-16 | Stop reason: HOSPADM

## 2023-02-15 RX ORDER — HYDROCODONE BITARTRATE AND ACETAMINOPHEN 5; 325 MG/1; MG/1
1 TABLET ORAL ONCE
Status: COMPLETED | OUTPATIENT
Start: 2023-02-15 | End: 2023-02-15

## 2023-02-15 RX ORDER — HYDROCODONE BITARTRATE AND ACETAMINOPHEN 7.5; 325 MG/1; MG/1
1 TABLET ORAL EVERY 8 HOURS PRN
Status: DISCONTINUED | OUTPATIENT
Start: 2023-02-15 | End: 2023-02-15

## 2023-02-15 RX ADMIN — GABAPENTIN 400 MG: 400 CAPSULE ORAL at 01:02

## 2023-02-15 RX ADMIN — ONDANSETRON 4 MG: 2 INJECTION INTRAMUSCULAR; INTRAVENOUS at 10:02

## 2023-02-15 RX ADMIN — HYDROCODONE BITARTRATE AND ACETAMINOPHEN 1 TABLET: 5; 325 TABLET ORAL at 01:02

## 2023-02-15 RX ADMIN — IPRATROPIUM BROMIDE 0.5 MG: 0.5 SOLUTION RESPIRATORY (INHALATION) at 07:02

## 2023-02-15 RX ADMIN — DILTIAZEM HYDROCHLORIDE 120 MG: 60 TABLET, FILM COATED ORAL at 09:02

## 2023-02-15 RX ADMIN — MEROPENEM AND SODIUM CHLORIDE 500 MG: 500 INJECTION, SOLUTION INTRAVENOUS at 05:02

## 2023-02-15 RX ADMIN — POLYETHYLENE GLYCOL 3350 17 G: 17 POWDER, FOR SOLUTION ORAL at 09:02

## 2023-02-15 RX ADMIN — Medication 400 MG: at 09:02

## 2023-02-15 RX ADMIN — HYDROCODONE BITARTRATE AND ACETAMINOPHEN 1 TABLET: 5; 325 TABLET ORAL at 03:02

## 2023-02-15 RX ADMIN — POTASSIUM BICARBONATE 25 MEQ: 977.5 TABLET, EFFERVESCENT ORAL at 09:02

## 2023-02-15 RX ADMIN — BARIUM SULFATE 75 ML: 0.81 POWDER, FOR SUSPENSION ORAL at 11:02

## 2023-02-15 RX ADMIN — GABAPENTIN 400 MG: 400 CAPSULE ORAL at 04:02

## 2023-02-15 RX ADMIN — CALCITRIOL CAPSULES 0.25 MCG 0.25 MCG: 0.25 CAPSULE ORAL at 09:02

## 2023-02-15 RX ADMIN — IPRATROPIUM BROMIDE 0.5 MG: 0.5 SOLUTION RESPIRATORY (INHALATION) at 01:02

## 2023-02-15 RX ADMIN — ENOXAPARIN SODIUM 40 MG: 40 INJECTION SUBCUTANEOUS at 04:02

## 2023-02-15 RX ADMIN — HYDROCODONE BITARTRATE AND ACETAMINOPHEN 1 TABLET: 5; 325 TABLET ORAL at 09:02

## 2023-02-15 RX ADMIN — GABAPENTIN 400 MG: 400 CAPSULE ORAL at 09:02

## 2023-02-15 RX ADMIN — TIZANIDINE 4 MG: 4 TABLET ORAL at 09:02

## 2023-02-15 RX ADMIN — MEROPENEM AND SODIUM CHLORIDE 500 MG: 500 INJECTION, SOLUTION INTRAVENOUS at 11:02

## 2023-02-15 RX ADMIN — LEVOTHYROXINE SODIUM 125 MCG: 125 TABLET ORAL at 05:02

## 2023-02-15 RX ADMIN — BUSPIRONE HYDROCHLORIDE 7.5 MG: 5 TABLET ORAL at 09:02

## 2023-02-15 RX ADMIN — TIZANIDINE 4 MG: 4 TABLET ORAL at 04:02

## 2023-02-15 RX ADMIN — MEROPENEM AND SODIUM CHLORIDE 500 MG: 500 INJECTION, SOLUTION INTRAVENOUS at 04:02

## 2023-02-15 RX ADMIN — HYDROCODONE BITARTRATE AND ACETAMINOPHEN 1 TABLET: 7.5; 325 TABLET ORAL at 09:02

## 2023-02-15 RX ADMIN — ARFORMOTEROL TARTRATE 15 MCG: 15 SOLUTION RESPIRATORY (INHALATION) at 07:02

## 2023-02-15 RX ADMIN — SENNOSIDES AND DOCUSATE SODIUM 1 TABLET: 50; 8.6 TABLET ORAL at 09:02

## 2023-02-15 RX ADMIN — MEROPENEM AND SODIUM CHLORIDE 500 MG: 500 INJECTION, SOLUTION INTRAVENOUS at 12:02

## 2023-02-15 NOTE — NURSING
POC reviewed with patient. Pt verbalized understanding  C/o pain to back. Moderately controlled by PRN meds and relaxation techniques.   AAO x4. VSS  NR on tele monitor.   PIV saline locked, clean, dry, intact  IV antibiotics maintained  Pt c/o nausea this shift  Pt on 3 L NC  No other c/o at this time.  Pt remains free of injuries and falls; fall precaution in place.   Bed low, side rails x2, call light in reach, personal belongings at bedside, bed alarm in use  Reminded to call for assistance.  Repositioned independently.  Hourly rounding complete. 12 hr chart check complete. Will continue to monitor.

## 2023-02-15 NOTE — PROCEDURES
[facility]   Inpatient MODIFIED BARIUM SWALLOW STUDY    Date: 2/13/2023     Name: Christine Horner   MRN: [id number]    Therapy Diagnosis: mild- moderate oropharyngeal dysphagia    Physician Orders: Fl Modified Barium Swallow Speech/SLP Video Swallow      Date of Evaluation:  2/13/2023    Procedure Min.   Fl Modified Barium Swallow Speech  45     Time in: 11:05 AM  Time out: 11:50 AM  Total Billable Time: 45 minutes    Radiation time: 2.3 minutes    Precautions: Standard, Fall, and Respiratory   Subjective   History of Current Condition: Christine Horner is a 63 y.o. female here today for Modified Barium Swallow Study (MBSS). Patient came to ED on 2/13/23 for an evaluation of shortness of breath. Patient has a history of COPD, shortness of breath, and pneumonia. Patient reported that she felt winded and presented with a hoarse vocal quality. Patient reported difficulty with swallowing, choking, globus sensation, and occasional vomiting.     History was provided by patient, and/or taken from chart review:   -Current diet at home: Thin liquids and regular consistencies   -Recommended diet from previous study: N/a  -Therapy received: n/a  -Neurological: Pt denied any neurological diagnoses.  -Gastroenterologist (GI): Pt denied any GI diagnoses.    -Pulmonary: Pt endorsed pulmonary diagnosis of COPD and pneumonia. Pt denied all other pulmonary diagnoses.  -Surgery: n/a  -Cancer: n/a    The following observations were made:   -Mental status: Alert and Cooperative  -Factors affecting performance: impairment or difficulty noted in endurance  -Feeding Method: needs some assistance    Respiratory Status:   -Respiratory Status: Nasal Canula 3L      Past Medical History: Christine Horner  has a past medical history of Anemia (2/14/2023), Arthritis, Chronic back pain, COPD (chronic obstructive pulmonary disease), COPD with acute exacerbation (04/24/2016), Pneumonia, and SOB (shortness of breath).  Christine  "Maria Alejandra Horner  has a past surgical history that includes Hodges tooth extraction; Tonsillectomy;  section; and Back surgery.    Medical Hx and Allergies:   Review of patient's allergies indicates:   Allergen Reactions    Ciprofloxacin Itching and Rash    Nitrofurantoin monohyd/m-cryst Hives    Penicillins Hives     Patient broke out in hives. Patient also said she had hives in her throat       Objective     Modified Barium Swallow Study  Purpose: to evaluate anatomy and physiology of the oropharyngeal swallow, to determine effectiveness of rehabilitation strategies, and to determine diet consistency and intervention recommendations. The study was performed using the "Gold Standard" of 30 fps with as low as reasonably achievable (ALARA) exposure.     The patient was seen in radiology seated in High Parson's position in a video imaging chair for lateral views of the larynx and an A/P view. The study was conducted using Varibar thin liquid (IDDSI 0), Varibar nectar liquid (IDDSI 2), Varibar pudding (IDDSI 4), Peaches mixed with Varibar thin liquid (IDDSI 6/0), and solid coated in Varibar pudding (IDDSI 7). She tolerated the procedure well.    Consistency  Presentation  Findings Strategy Attempted Rosenbeck's Penetration/Aspiration Scale (PAS)   Thin (IDDSI 0) Method: Clinician-fed     Volume: straw sip x3      Projection: lateral view; AP view  Oral phase: WFL    Pharyngeal phase: Deep penetration observed during and after the swallow; minimum residue at the valleculae and pyriform sinuses that was reduced with a cued swallow.     Esophageal screen: Retrograde flow and retention at the proximal-medial esophagus.  Supraglottic Swallow and small sip:  - effective for facilitating clearance of laryngeal vestibule after penetration  Best: (2) Material enters the airway, remains above the vocal folds, and is ejected from the airway    Worst: (5) Material enters the airway, contacts the vocal folds, and is not ejected " from the airway     Nectar thick (mildly thick/IDDSI 2) Method: clinician fed    Volume: straw sip x2     Projection: lateral view Oral phase: WFL    Pharyngeal phase: Penetration observed during and after the swallow      Best: (2) Material enters the airway, remains above the vocal folds, and is ejected from the airway    Worst: (3) Material enters the airway, remains above the vocal folds, and is not ejected from the airway     Puree (extremely thick/ IDDSI 4) Method: clinician fed    Volume: tbsp bite x2     Projection: lateral view; AP view  Oral phase: Minimum oral residue    Pharyngeal phase: Transient penetration after the swallow; minimum residue at the base of tongue, valleculae, posterior pharyngeal wall, and UES    Esophageal screen: Retention observed at the level of the aortic arch    Best: (1) Material does not enter the airway    Worst: (2) Material enters the airway, remains above the vocal folds, and is ejected from the airway     Solid (regular/ IDDSI 7) Method: Self-fed    Volume: bite x2     Projection: lateral view; AP view  Oral phase: Trace to minimum residue    Pharyngeal phase: Minimum residue at the base of tongue and valleculae   Best: (1) Material does not enter the airway    Worst: (1) Material does not enter the airway     Barium tablet  Method: Self-fed    Volume: single tablet with water bolus(es)    Projection: lateral view Timely and efficient oropharyngeal clearance         Treatment   Treatment Time In: n/a  Treatment Time Out: n/a  Total Treatment Time: n/a  Patient educated regarding results and recommendations of the evaluation. See the recommendations section below.    Education: Plan of Care, role of SLP in care, and aspiration precautions  and anatomy and physiology of swallow mechanism as it relates to MBSS findings and recommendations were discussed with the patient. Patient expressed understanding. All questions were answered.     Assessment     Christine Horner is a  63 y.o. female referred for Modified Barium Swallow Study. The patient presents with mild-moderate oropharyngeal dysphagia as determined by the Dysphagia Outcome and Severity Scale (GENESIS). Level 4: Mild-Moderate Dysphagia.    Modified Barium Swallow Study (MBSS) revealed oral phase characterized by reduced lingual and labial strength and range of motion for tongue control, bolus preparation and transport. Lip closure was adequate with no labial escape. Bolus prep and mastication was prolonged with complete recollection. Lingual motion was brisk for adequate bolus transport. There was trace to minimum oral residue on puree and solid consistencies which was reduced with cued swallow. The swallow was initiated when the head of the bolus entered the vallecula.    Pharyngeal phase characterized by timely initiation of swallow across consistencies. The soft palate elevated for complete of the velopharyngeal port. During pharyngeal swallow, reduced base of tongue retraction, anterior hyoid excursion, laryngeal elevation, and pharyngeal stripping wave resulted in incomplete epiglottic inversion and UES opening with  deep penetration of thin liquids, penetration of nectar thick liquids, and transient penetration of puree. Of note, penetration of various consistencies occurred either during the swallow or after the swallow from pharyngeal residue. Minimal residue was observed at the base of tongue, valleculae, pyriform sinuses, posterior pharyngeal wall, and UES and was reduced with a cued sequential swallow. Of note, a CP bar was observed, however this did not appear to impact the safety or efficiency of the swallow. Patient's respiratory compromise, observed fatigue and inability to consistently clear laryngeal vestibule throughout the study may overall negatively impact swallow safety over the course of a meal. Additionally, nectar thick liquids did not prove to make a significant difference in swallow safety or  "efficiency.   Thickened liquids are also associated with risks including dehydration, increased pharyngeal residue, potential interference with medication absorption, and decreased quality of life (Shae, 2013). Thickened liquids are also more likely to be silently aspirated than thin liquids (Sidney et al., 2018). Additionally, patient stated "I will not change my diet."    On esophageal screen, delayed esophageal emptying and retrograde flow below the pharyngo-esophageal segment  were noted. Of note, retention was observed around the level of the aortic arch. See frames 21-24 from imaging. Further esophageal imaging including EGD and/or barium esophagram as well as follow-up with GI is recommended.    Impressions: Patient presents with mild to moderate oropharyngeal dysphagia, likely acute on chronic related to history of COPD, shortness of breath, pneumonia and generalized weakness. In consideration of the Dynamic Imaging Grade of Swallowing Toxicity (DIGEST) (Nicolas et al, 2017), patient presents with mild safety impairment and mild to moderate efficiency impairment. Patient appears to be at low to moderate risk for aspiration related PNA in consideration of three pillars of aspiration pneumonia (Tammy, 2005) including preserved oral health status, compromised overall health/immune status, and laryngeal vestibule closure/severity of dysphagia. However, unable to assess risk related to aspiration pneumonia cause by the aspiration of gastric content. Patient appears to be a good candidate for behavioral swallow rehabilitation.     References:  SREE Munoz (2005, March). Pneumonia: Factors Beyond Aspiration. Perspectives in Swallowing and Swallowing Disorders (Dysphagia), 14, 10-16.  Shae CROW (2013). Thickening agents used for dysphagia management: Effect on bioavailability of water, medication and feelings of satiety. Nutrition Journal, 12, 54. https://doi.org/10.1186/7850-8114-94-54  Ld PIZARRO, " You MP, Lucho DA, Toma JK, Lexie HY, Donte RS, Hemant CD, Ajit SY, Joey CP, Dianna J, Lazarus CL, May A, Yudelka J, Doroteo JW, Tank HM, Al JS. Dynamic Imaging Grade of Swallowing Toxicity (DIGEST): Scale development and validation. Cancer. 2017 Jan 1;123(1):62-70. doi: 10.1002/cncr.17190. Epub 2016 Aug 26. PMID: 55249508; PMCID: [id number].    Recommendations:     Consistency Recommendations:  Thin liquids (IDDSI 0) and Regular consistencies (IDDSI 7).   Risk Management/Swallow Guidelines: use good oral hygiene , sit upright for all PO intake, increase physical mobility as tolerated, small bites and sips, and encourage volitional dry swallows and coughs throughout meals use hard swallows  Specialist Referrals: GI  Ancillary Tests: Consider EGD   Therapy: Dysphagia therapy is recommended including Chin Tuck Against Resistance (CTAR), Supraglottic Swallow, Mendelsohn, and effortful swallows.  Follow-up exam: Follow up instrumental assessment of the swallow should be completed at the recommendation of the treating clinician.    Please contact [facility] at [phone] if there are questions re: the above or if we can be of additional service to this patient.    Chantale Barr BA  Student Speech Language Pathologist     Jud Prieto, CCC-SLP, CBIS   Speech Language Pathologist   Certified Brain Injury Specialist   2/15/2023

## 2023-02-15 NOTE — NURSING
"Pt c/o  a little dysphagia since lunch. Pt stated, "I feel like some food has been getting stuck." Pt's voice is hoarse.  Pt was started on meropenem today and pharmacy requested additional information about severity of allergy levaquin, which the patient responded that she does not recall having an allergy to it. Pt appears to be in no distress at this time and is not requiring additional oxygen. Pt has expiratory wheezing. Notified Ike Allen MD. MD assessed pt at bedside. MD instructed to continue IV meropenem. Will continue to monitor.   "

## 2023-02-15 NOTE — ASSESSMENT & PLAN NOTE
Patient with Hypoxic Respiratory failure which is Acute on chronic.  she is on home oxygen at 3 LPM. Supplemental oxygen was provided and noted- Oxygen Concentration (%):  [36] 36.   Signs/symptoms of respiratory failure include- tachypnea and increased work of breathing. Contributing diagnoses includes - COPD, Interstitial lung disease, Pleural effusion and Pneumonia Labs and images were reviewed. Patient Has not had a recent ABG but does have evidence of anion gap metabolic acidosis, elevated lactic acid and, and decreased bicarb noted on labs. Will treat underlying causes and adjust management of respiratory failure as follows-   Plan:  -continue antibiotics  -titrate oxygen therapy as needed  -incentive spirometry  -f/u ABG    Continue current regimen  Added acapella   Schedule xopenex while awake

## 2023-02-15 NOTE — PT/OT/SLP PROGRESS
Physical Therapy  Treatment    Christine Horner   MRN: [id number]   Admitting Diagnosis: <principal problem not specified>    PT Received On: 02/15/23  PT Start Time: 1055     PT Stop Time: 1120    PT Total Time (min): 25 min       Billable Minutes:  Therapeutic Activity 15 and Therapeutic Exercise 10    Treatment Type: Treatment  PT/PTA: PT     PTA Visit Number: 0       General Precautions: Standard, fall  Orthopedic Precautions: N/A  Braces: N/A  Respiratory Status: Nasal cannula, flow 3 L/min         Subjective:  Communicated with NURSE WILSON AND [facility] CHART REVIEW  prior to session.   PT AGREED TO TX ON 2ND ATTEMPT    Pain/Comfort  Pain Rating 1: 0/10  Pain Rating Post-Intervention 1: 0/10    Objective:   Patient found with: peripheral IV, telemetry, oxygen    Functional Mobility:  PT MET IN RM SUP IN BED. PT REPORTED NEEDING TO URINATE AND USING PUREWICK. P.T. ENCOURAGED PT TO GET UP TO BSC HOWEVER PT DECLINED REPORTED IT WOULD WEAR PT OUT TO MUCH. P.T. EDUCATED PT ON PURPOSE FOR P.T. AND INC ENDURANCE AND STRENGTH. PT CONT TO DECLINE. PT SUP>SIT EOB AFTER PUREWICK USE WITH SBA AND SCOOTED TO EOB WITH SBA. PT COMPLETE SQUAT PIVOT T/F TO CHAIR WITH CGA. PT SEATED FOR REST AND EDUCATED ON PURSED LIP BREATHING. PT REQUIRING INC TIME TO RECOVER D/T REPORTS OF FATIGUE AND SOB. PT COMPLETED B LE TE X 8 REPS OF AP, TKE, AND MIP WITH LIMITED ROM AND INC REST. PT LEFT SEATED WITH ALL NEEDS MET.   Treatment and Education:      AM-PAC 6 CLICK MOBILITY  How much help from another person does this patient currently need?   1 = Unable, Total/Dependent Assistance  2 = A lot, Maximum/Moderate Assistance  3 = A little, Minimum/Contact Guard/Supervision  4 = None, Modified [place]/Independent    Turning over in bed (including adjusting bedclothes, sheets and blankets)?: 4  Sitting down on and standing up from a chair with arms (e.g., wheelchair, bedside commode, etc.): 3  Moving from lying on back to sitting on the side of  the bed?: 4  Moving to and from a bed to a chair (including a wheelchair)?: 3  Need to walk in hospital room?: 1  Climbing 3-5 steps with a railing?: 1  Basic Mobility Total Score: 16    AM-PAC Raw Score CMS G-Code Modifier Level of Impairment Assistance   6 % Total / Unable   7 - 9 CM 80 - 100% Maximal Assist   10 - 14 CL 60 - 80% Moderate Assist   15 - 19 CK 40 - 60% Moderate Assist   20 - 22 CJ 20 - 40% Minimal Assist   23 CI 1-20% SBA / CGA   24 CH 0% Independent/ Mod I     Patient left up in chair with all lines intact, call button in reach, and chair alarm on.    Assessment:  PT REANNA TX WITH INC REST BREAKS    Rehab identified problem list/impairments: weakness, impaired endurance, decreased coordination, decreased upper extremity function, decreased lower extremity function, decreased safety awareness, impaired balance, gait instability, impaired functional mobility, impaired self care skills, decreased ROM, impaired cardiopulmonary response to activity    Rehab potential is good.    Activity tolerance: Poor    Discharge recommendations: nursing facility, skilled      Barriers to discharge:      Equipment recommendations: none     GOALS:   Multidisciplinary Problems       Physical Therapy Goals          Problem: Physical Therapy    Goal Priority Disciplines Outcome Goal Variances Interventions   Physical Therapy Goal     PT, PT/OT Ongoing, Progressing     Description: LT23  1. PT WILL COMPLETE BED MOBILITY IND  2. PT WILL STAND T/F TO CHAIR WITH RW AND S.  3. PT WILL GT TRAIN X 10' WITH RW AND MIN A                         PLAN:    Patient to be seen 3 x/week to address the above listed problems via therapeutic activities, therapeutic exercises  Plan of Care expires: 23  Plan of Care reviewed with: patient         02/15/2023

## 2023-02-15 NOTE — PLAN OF CARE
Problem: Adult Inpatient Plan of Care  Goal: Plan of Care Review  Outcome: Ongoing, Progressing  Goal: Patient-Specific Goal (Individualized)  Outcome: Ongoing, Progressing  Goal: Absence of Hospital-Acquired Illness or Injury  Outcome: Ongoing, Progressing  Goal: Optimal Comfort and Wellbeing  Outcome: Ongoing, Progressing     Problem: Fluid Imbalance (Pneumonia)  Goal: Fluid Balance  Outcome: Ongoing, Progressing     Problem: Infection (Pneumonia)  Goal: Resolution of Infection Signs and Symptoms  Outcome: Ongoing, Progressing

## 2023-02-15 NOTE — PT/OT/SLP EVAL
Speech Language Pathology Evaluation  Bedside Swallow    Patient Name:  Christine Horner   MRN:  [id number]  Admitting Diagnosis: <principal problem not specified>    Recommendations:                 General Recommendations:  Dysphagia therapy and Modified barium swallow study  Diet recommendations:  Regular Diet - IDDSI Level 7, Thin liquids - IDDSI Level 0   Aspiration Precautions: 1 bite/sip at a time, Assistance with meals, Avoid talking while eating, Double swallow with each bite/sip, Eliminate distractions, Feed only when awake/alert, Frequent oral care, HOB to 90 degrees, Remain upright 30 minutes post meal, Small bites/sips, Strict aspiration precautions, and Wear oxygen during intake   General Precautions: Standard, aspiration  Communication strategies:  none    History:     Past Medical History:   Diagnosis Date    Anemia 2023    Arthritis     Chronic back pain     COPD (chronic obstructive pulmonary disease)     COPD with acute exacerbation 2016    Pneumonia     SOB (shortness of breath)        Past Surgical History:   Procedure Laterality Date    BACK SURGERY       SECTION      TONSILLECTOMY      WISDOM TOOTH EXTRACTION         Social History: Patient lives at home with spouse in [place], [place].    Prior Intubation HX:  N/A    Modified Barium Swallow: Completed 2/15/23--See report for details and recs/goals for continued dysphagia intervention.    Chest X-Rays:   EXAMINATION:  XR CHEST AP PORTABLE     CLINICAL HISTORY:  dyspnea;     TECHNIQUE:  Single frontal view of the chest was performed.     COMPARISON:  Prior     FINDINGS:  Deformity of the posterolateral right ribs.  Curvilinear opacities suggestive of atelectasis or scarring.  Increase nodular opacities in the right midlung zone may relate to developing new right mid and right lower lung zone pneumonia.     Bones are intact.     Impression:     As above        Electronically signed by: John Garcia  Date:                                             02/13/2023  Time:                                           18:09           Exam Ended: 02/13/23 17:50 Last Resulted: 02/13/23 18:09             Prior diet: Pt consumes a regular diet at home and denied dysphagia hx.  Pt stated she will not modify her diet and would like to continue regular diet despite medical hx and aspiration risks.    Subjective     Pt seen bedside for ST bedside swallowing evaluation.  No c/o pain.  No family present.  Pt pleasant but anxious.  Patient goals: To continue eating/drinking regular diet.    Pain/Comfort:  Pain Rating 1: 0/10  Pain Rating Post-Intervention 1: 0/10  Pain Rating 2: 0/10  Pain Rating Post-Intervention 2: 0/10    Respiratory Status: Nasal cannula, flow 3 L/min    Objective:     Oral Musculature Evaluation  Oral Musculature: general weakness  Secretion Management: adequate  Mucosal Quality: good  Mandibular Strength and Mobility: WFL  Oral Labial Strength and Mobility: WFL  Lingual Strength and Mobility: impaired strength  Velar Elevation: WFL  Buccal Strength and Mobility: WFL  Volitional Cough: present, chronic  Volitional Swallow: present  Voice Prior to PO Intake: WFL    Bedside Swallow Eval:   Consistencies Assessed:  Pt consumed thin liquids, pureed solids and regular solids during bedside CSE.     Thickened liquids were not used in this assessment. OSienna (2018) reported that thickened liquids have no sound evidence at reducing risk of pneumonia in patients with dysphagia and can cause harm by increasing risk of dehydration. It also presents an increased risk of UTI, electrolyte imbalance, constipation, fecal impaction, cognitive impairment, functional decline, and even death (Andrew, 2002; Rachana, 2016).  This supports the assertion that we should confirm a patient requires thickened liquids with an instrumental swallow study prior to recommending them.    Oral Phase:   WFL    Pharyngeal Phase:   Delayed cough x1 during bedside  assessment with consistency non-specific.  globus sensation    Compensatory Strategies  Upright at 90 degrees  Single sip strategy    Treatment: Pt recommended for MBSS as comprehensive swallowing assessment with diet recs/goals to follow.      Assessment:     Christine Horner is a 63 y.o. female with an SLP diagnosis of suspected Dysphagia following choking incident on rice, which led to SLP consult.  She presents with increased risk s/t cardiac and pulmonary comorbidites, specifically including severe COPD, emphysema and recurrent PNA.  Pt would benefit from MBSS as comprehensive swallowing evaluation with diet recs/goals to follow.  Post-acute dysphagia intervention recommended.    Goals:   Multidisciplinary Problems       SLP Goals          Problem: SLP    Goal Priority Disciplines Outcome   SLP Goal     SLP    Description: 1.  Pt will consume a regular consistency diet/thin liquids with use of compensatory strategies, following aspiration precautions.  2.  Pt will initiate aggressive oral care/hygiene practices into ADL's.  3.  MBSS as comprehensive swallowing assessment and STG's to follow as clinically indicated.                       Plan:     Patient to be seen:  2 x/week, 3 x/week   Plan of Care expires:  02/22/23  Plan of Care reviewed with:  patient   SLP Follow-Up:  Yes       Discharge recommendations:  nursing facility, skilled   Barriers to Discharge:  None    Time Tracking:     SLP Treatment Date:  02/15/2023  Speech Start Time:  1000  Speech Stop Time:  1030     Speech Total Time (min):  30 min    Billable Minutes: Eval Swallow and Oral Function 15 minutes and Self Care/Home Management Training 15 minutes    02/15/2023

## 2023-02-15 NOTE — PROGRESS NOTES
[facility] Medicine  Progress Note    Patient Name: Christine Horner  MRN: [id number]  Patient Class: IP- Inpatient   Admission Date: 2/13/2023  Length of Stay: 2 days  Attending Physician: Ike Allen MD  Primary Care Provider: Eloy Hdez MD        Subjective:     Principal Problem:<principal problem not specified>        HPI:  Christine Horner is a 63 y.o. female with a PMH  has a past medical history of Arthritis, Chronic back pain, COPD (chronic obstructive pulmonary disease), COPD with acute exacerbation (04/24/2016), Pneumonia, and SOB (shortness of breath). who presented to the ED complaining of progressively worsening shortness a breath x1 week duration with acute worsening yesterday.  Patient reports being on chronic home O2 of 3 L via nasal cannula now requiring up titration of supplemental oxygen.  Patient denied endorsing any fever, chills, sweats, chest pain, abdominal pain, dysuria, melena, hematochezia, diarrhea, or onset neurological deficits however did report intermittent nausea with vomiting, dyspnea on exertion, bilateral for chest pain upon deep inspiration described as sharp/stabbing in nature, rated 8/10 in severity, in addition to dyspnea worsened upon lying flat.  She reported no other known alleviating or aggravating factors and reported all other review of systems negative except as noted above.  Patient also reported DuoNebs administered at home had little to no relief in symptoms prompting her to come to the ED to be further evaluated.  Patient also reported similar symptoms occurred when she was diagnosed with pneumonia requiring hospitalization and IV antibiotics.  Hospital Medicine consulted by ED staff for admission for continued treatment of pneumonia in setting of acute on chronic hypoxemic respiratory failure.     PCP: Eloy Hdez        Overview/Hospital Course:  2/14 endorses symptom improvement with dyspnea, appetite and fatigue.  Reports palpitations upon standing. Continue scheduled breathing treatments, intravenous antibiotic(s). Add acappella and incentive spirometer. Smoking cessation approximately 10 years ago. [facility] cll, followed by Dr. Prieto  2/15 at baseline supplemental oxygen. Continue intravenous antibiotic(s) h/o pseudomonas pneumonia. Leukocytosis trending down. Awaiting skilled nursing facility placement. Blood culture negative growth to date.       Interval History: See hospital course for today      Review of Systems   Constitutional:  Positive for activity change and fatigue. Negative for appetite change and fever.   HENT:  Positive for trouble swallowing (improved).    Respiratory:  Positive for cough and shortness of breath (chronic, stable).    Cardiovascular:  Positive for palpitations.   Gastrointestinal:  Negative for abdominal pain, nausea and vomiting.   Skin:  Positive for wound.   Allergic/Immunologic: Positive for immunocompromised state.   Neurological:  Positive for weakness.   Psychiatric/Behavioral:  The patient is nervous/anxious.    Objective:     Vital Signs (Most Recent):  Temp: 97.8 °F (36.6 °C) (02/15/23 0752)  Pulse: (!) 112 (02/15/23 0752)  Resp: 20 (02/15/23 0918)  BP: 115/62 (02/15/23 0752)  SpO2: (!) 93 % (02/15/23 0752)   Vital Signs (24h Range):  Temp:  [97 °F (36.1 °C)-98.1 °F (36.7 °C)] 97.8 °F (36.6 °C)  Pulse:  [] 112  Resp:  [15-20] 20  SpO2:  [93 %-97 %] 93 %  BP: ()/(55-66) 115/62     Weight: 56.8 kg (125 lb 3.5 oz)  Body mass index is 21.49 kg/m².    Intake/Output Summary (Last 24 hours) at 2/15/2023 0947  Last data filed at 2/15/2023 0929  Gross per 24 hour   Intake 464.54 ml   Output 4950 ml   Net -4485.46 ml      Physical Exam  Vitals and nursing note reviewed.   Constitutional:       General: She is not in acute distress.     Appearance: She is ill-appearing. She is not toxic-appearing.      Interventions: Nasal cannula in place.   HENT:      Head: Normocephalic and  atraumatic.   Cardiovascular:      Rate and Rhythm: Tachycardia present.   Pulmonary:      Effort: Tachypnea and respiratory distress present.      Breath sounds: Decreased air movement present.   Abdominal:      Palpations: Abdomen is soft.      Tenderness: There is no abdominal tenderness.   Genitourinary:     Comments: myrick  Skin:     General: Skin is warm and dry.      Coloration: Skin is pale.   Neurological:      Mental Status: She is alert and oriented to person, place, and time.      Motor: Weakness present.   Psychiatric:         Mood and Affect: Mood is anxious.         Behavior: Behavior is cooperative.       Significant Labs: All pertinent labs within the past 24 hours have been reviewed.  Blood Culture:   Recent Labs   Lab 02/13/23  1643 02/13/23  1703   LABBLOO No Growth to date  No Growth to date No Growth to date  No Growth to date     CBC:   Recent Labs   Lab 02/13/23  1702 02/14/23  0500 02/15/23  0533   WBC 53.34* 30.37* 27.67*   HGB 11.6* 8.2* 9.2*   HCT 37.2 25.9* 30.5*   * 460* 497*     CMP:   Recent Labs   Lab 02/13/23  1702 02/14/23  0500 02/15/23  0533    132* 138   K 3.8 3.1* 3.9   CL 90* 96 99   CO2 18* 19* 26   GLU 97 108 82   BUN 12 12 8   CREATININE 0.8 0.7 0.7   CALCIUM 9.4 7.7* 8.4*   PROT 7.3 4.8* 5.8*   ALBUMIN 3.3* 2.3* 2.8*   BILITOT 0.4 0.2 0.3   ALKPHOS 143* 89 129   AST 66* 40 45*   ALT 44 26 26   ANIONGAP 28* 17* 13       Significant Imaging: I have reviewed all pertinent imaging results/findings within the past 24 hours.      Assessment/Plan:      Anemia  Appears above last reported baseline with H/H currently measuring 11.6/37.2 without evidence of active bleeding noted.  Plan:  -continue to monitor  -type/screen, transfuse as needed    2/14  Hb/hct trending down  No overt signs or symptoms of bleeding  Likely dilutional   Iron studies pending    2/15  Iron def anemia  Recommend fe po supplement on discharge     CLL (chronic lymphocytic leukemia)  Patient  currently follows Dr. Prieto from hem/onc.  CBC currently showing WBC 53.34, H/H 11.6/37.2, platelets 794.  Hematology consult by ED staff with recommendations to treat for pneumonia and order quantitative immunoglobulins.  Plan:  -continued treatment of pneumonia noted above  -f/u immunoglobulins  -f/u Heme-Onc outpatient, Dr Prieto  No smudge cells on peripheral smear    Postablative hypothyroidism  Continue home medication(s)   Follow up outpatient       Paroxysmal SVT (supraventricular tachycardia)  Currently on diltiazem outpatient.  Plan:  -telemetry  -continue home medication, titrate as needed      Acute on chronic respiratory failure with hypoxia  Patient with Hypoxic Respiratory failure which is Acute on chronic.  she is on home oxygen at 3 LPM. Supplemental oxygen was provided and noted- Oxygen Concentration (%):  [36] 36.   Signs/symptoms of respiratory failure include- tachypnea and increased work of breathing. Contributing diagnoses includes - COPD, Interstitial lung disease, Pleural effusion and Pneumonia Labs and images were reviewed. Patient Has not had a recent ABG but does have evidence of anion gap metabolic acidosis, elevated lactic acid and, and decreased bicarb noted on labs. Will treat underlying causes and adjust management of respiratory failure as follows-   Plan:  -continue antibiotics  -titrate oxygen therapy as needed  -incentive spirometry  -f/u ABG    Continue current regimen  Added acapella   Schedule xopenex while awake      Centrilobular emphysema  Smoking cessation 10 years ago  On baseline supplemental oxygen 3L       COPD, very severe  Initiated on intravenous cefepime  Recent history of pseudomonas pneumonia in 11/2022  Intermediate sensitivity to cefepime   Switch to merrem  Allergy noted to penicillin   penfast 0, very low risk  Tolerating merrem   acapella and incentive spirometer   Follow up primary pulmonology on discharge, [facility]    VTE Risk Mitigation (From  admission, onward)         Ordered     enoxaparin injection 40 mg  Daily         02/13/23 2052     IP VTE HIGH RISK PATIENT  Once         02/13/23 2052     Place sequential compression device  Until discontinued         02/13/23 2052                Discharge Planning   MARK:      Code Status: Full Code   Is the patient medically ready for discharge?: Yes    Reason for patient still in hospital (select all that apply): Patient trending condition, Laboratory test, Treatment, Consult recommendations, PT / OT recommendations and Pending disposition  Discharge Plan A: Skilled Nursing Facility                  Ike Allen MD  Department of Hospital Medicine   [facility] ([facility])

## 2023-02-15 NOTE — PLAN OF CARE
SW met with patient to provide update regarding SNF benefits. Patient would have to pay 20% of the costs for SNF, ~$72 daily. If pt would require 20 days of SNF, the cost would be ~$1440. Patient states she is unable to afford this cost.   Patient inquired about home health coverage. Pt agreeable to referral to [facility]. SW requested [facility] verify patient's benefits.  MD updated.  CALLIE to f/u.

## 2023-02-15 NOTE — ASSESSMENT & PLAN NOTE
Initiated on intravenous cefepime  Recent history of pseudomonas pneumonia in 11/2022  Intermediate sensitivity to cefepime   Switch to merrem  Allergy noted to penicillin   penfast 0, very low risk  Tolerating merrem   acapella and incentive spirometer   Follow up primary pulmonology on discharge, [facility]

## 2023-02-15 NOTE — ASSESSMENT & PLAN NOTE
Patient currently follows Dr. Prieto from hem/onc.  CBC currently showing WBC 53.34, H/H 11.6/37.2, platelets 794.  Hematology consult by ED staff with recommendations to treat for pneumonia and order quantitative immunoglobulins.  Plan:  -continued treatment of pneumonia noted above  -f/u immunoglobulins  -f/u Heme-Onc outpatient, Dr Prieto  No smudge cells on peripheral smear

## 2023-02-15 NOTE — SUBJECTIVE & OBJECTIVE
Interval History: See hospital course for today      Review of Systems   Constitutional:  Positive for activity change and fatigue. Negative for appetite change and fever.   HENT:  Positive for trouble swallowing (improved).    Respiratory:  Positive for cough and shortness of breath (chronic, stable).    Cardiovascular:  Positive for palpitations.   Gastrointestinal:  Negative for abdominal pain, nausea and vomiting.   Skin:  Positive for wound.   Allergic/Immunologic: Positive for immunocompromised state.   Neurological:  Positive for weakness.   Psychiatric/Behavioral:  The patient is nervous/anxious.    Objective:     Vital Signs (Most Recent):  Temp: 97.8 °F (36.6 °C) (02/15/23 0752)  Pulse: (!) 112 (02/15/23 0752)  Resp: 20 (02/15/23 0918)  BP: 115/62 (02/15/23 0752)  SpO2: (!) 93 % (02/15/23 0752)   Vital Signs (24h Range):  Temp:  [97 °F (36.1 °C)-98.1 °F (36.7 °C)] 97.8 °F (36.6 °C)  Pulse:  [] 112  Resp:  [15-20] 20  SpO2:  [93 %-97 %] 93 %  BP: ()/(55-66) 115/62     Weight: 56.8 kg (125 lb 3.5 oz)  Body mass index is 21.49 kg/m².    Intake/Output Summary (Last 24 hours) at 2/15/2023 0947  Last data filed at 2/15/2023 0929  Gross per 24 hour   Intake 464.54 ml   Output 4950 ml   Net -4485.46 ml      Physical Exam  Vitals and nursing note reviewed.   Constitutional:       General: She is not in acute distress.     Appearance: She is ill-appearing. She is not toxic-appearing.      Interventions: Nasal cannula in place.   HENT:      Head: Normocephalic and atraumatic.   Cardiovascular:      Rate and Rhythm: Tachycardia present.   Pulmonary:      Effort: Tachypnea and respiratory distress present.      Breath sounds: Decreased air movement present.   Abdominal:      Palpations: Abdomen is soft.      Tenderness: There is no abdominal tenderness.   Genitourinary:     Comments: myrick  Skin:     General: Skin is warm and dry.      Coloration: Skin is pale.   Neurological:      Mental Status: She is  alert and oriented to person, place, and time.      Motor: Weakness present.   Psychiatric:         Mood and Affect: Mood is anxious.         Behavior: Behavior is cooperative.       Significant Labs: All pertinent labs within the past 24 hours have been reviewed.  Blood Culture:   Recent Labs   Lab 02/13/23  1643 02/13/23  1703   LABBLOO No Growth to date  No Growth to date No Growth to date  No Growth to date     CBC:   Recent Labs   Lab 02/13/23  1702 02/14/23  0500 02/15/23  0533   WBC 53.34* 30.37* 27.67*   HGB 11.6* 8.2* 9.2*   HCT 37.2 25.9* 30.5*   * 460* 497*     CMP:   Recent Labs   Lab 02/13/23  1702 02/14/23  0500 02/15/23  0533    132* 138   K 3.8 3.1* 3.9   CL 90* 96 99   CO2 18* 19* 26   GLU 97 108 82   BUN 12 12 8   CREATININE 0.8 0.7 0.7   CALCIUM 9.4 7.7* 8.4*   PROT 7.3 4.8* 5.8*   ALBUMIN 3.3* 2.3* 2.8*   BILITOT 0.4 0.2 0.3   ALKPHOS 143* 89 129   AST 66* 40 45*   ALT 44 26 26   ANIONGAP 28* 17* 13       Significant Imaging: I have reviewed all pertinent imaging results/findings within the past 24 hours.

## 2023-02-15 NOTE — ASSESSMENT & PLAN NOTE
Appears above last reported baseline with H/H currently measuring 11.6/37.2 without evidence of active bleeding noted.  Plan:  -continue to monitor  -type/screen, transfuse as needed    2/14  Hb/hct trending down  No overt signs or symptoms of bleeding  Likely dilutional   Iron studies pending    2/15  Iron def anemia  Recommend fe po supplement on discharge

## 2023-02-16 VITALS
WEIGHT: 125.25 LBS | RESPIRATION RATE: 16 BRPM | TEMPERATURE: 98 F | HEIGHT: 64 IN | SYSTOLIC BLOOD PRESSURE: 101 MMHG | DIASTOLIC BLOOD PRESSURE: 62 MMHG | BODY MASS INDEX: 21.38 KG/M2 | HEART RATE: 83 BPM | OXYGEN SATURATION: 96 %

## 2023-02-16 LAB
ACANTHOCYTES BLD QL SMEAR: PRESENT
ALBUMIN SERPL BCP-MCNC: 2.8 G/DL (ref 3.5–5.2)
ALP SERPL-CCNC: 143 U/L (ref 55–135)
ALT SERPL W/O P-5'-P-CCNC: 25 U/L (ref 10–44)
ANION GAP SERPL CALC-SCNC: 15 MMOL/L (ref 8–16)
ANISOCYTOSIS BLD QL SMEAR: SLIGHT
AST SERPL-CCNC: 49 U/L (ref 10–40)
BASOPHILS NFR BLD: 1 % (ref 0–1.9)
BILIRUB SERPL-MCNC: 0.2 MG/DL (ref 0.1–1)
BUN SERPL-MCNC: 8 MG/DL (ref 8–23)
CALCIUM SERPL-MCNC: 8.2 MG/DL (ref 8.7–10.5)
CHLORIDE SERPL-SCNC: 95 MMOL/L (ref 95–110)
CO2 SERPL-SCNC: 28 MMOL/L (ref 23–29)
CREAT SERPL-MCNC: 0.6 MG/DL (ref 0.5–1.4)
DIFFERENTIAL METHOD: ABNORMAL
EOSINOPHIL NFR BLD: 5 % (ref 0–8)
ERYTHROCYTE [DISTWIDTH] IN BLOOD BY AUTOMATED COUNT: 14.8 % (ref 11.5–14.5)
EST. GFR  (NO RACE VARIABLE): >60 ML/MIN/1.73 M^2
GLUCOSE SERPL-MCNC: 89 MG/DL (ref 70–110)
HCT VFR BLD AUTO: 33.3 % (ref 37–48.5)
HGB BLD-MCNC: 9.7 G/DL (ref 12–16)
IMM GRANULOCYTES # BLD AUTO: ABNORMAL K/UL (ref 0–0.04)
IMM GRANULOCYTES NFR BLD AUTO: ABNORMAL % (ref 0–0.5)
LYMPHOCYTES NFR BLD: 40 % (ref 18–48)
MCH RBC QN AUTO: 26.8 PG (ref 27–31)
MCHC RBC AUTO-ENTMCNC: 29.1 G/DL (ref 32–36)
MCV RBC AUTO: 92 FL (ref 82–98)
MONOCYTES NFR BLD: 2 % (ref 4–15)
NEUTROPHILS NFR BLD: 51 % (ref 38–73)
NEUTS BAND NFR BLD MANUAL: 1 %
NRBC BLD-RTO: 0 /100 WBC
OVALOCYTES BLD QL SMEAR: ABNORMAL
PLATELET # BLD AUTO: 532 K/UL (ref 150–450)
PLATELET BLD QL SMEAR: ABNORMAL
PMV BLD AUTO: 9.5 FL (ref 9.2–12.9)
POIKILOCYTOSIS BLD QL SMEAR: ABNORMAL
POTASSIUM SERPL-SCNC: 4.6 MMOL/L (ref 3.5–5.1)
PROT SERPL-MCNC: 6 G/DL (ref 6–8.4)
RBC # BLD AUTO: 3.62 M/UL (ref 4–5.4)
SMUDGE CELLS BLD QL SMEAR: PRESENT
SODIUM SERPL-SCNC: 138 MMOL/L (ref 136–145)
WBC # BLD AUTO: 29 K/UL (ref 3.9–12.7)

## 2023-02-16 PROCEDURE — 94664 DEMO&/EVAL PT USE INHALER: CPT

## 2023-02-16 PROCEDURE — 85027 COMPLETE CBC AUTOMATED: CPT | Performed by: HOSPITALIST

## 2023-02-16 PROCEDURE — 25000003 PHARM REV CODE 250: Performed by: HOSPITALIST

## 2023-02-16 PROCEDURE — 97535 SELF CARE MNGMENT TRAINING: CPT

## 2023-02-16 PROCEDURE — 92526 ORAL FUNCTION THERAPY: CPT

## 2023-02-16 PROCEDURE — 94799 UNLISTED PULMONARY SVC/PX: CPT

## 2023-02-16 PROCEDURE — 97530 THERAPEUTIC ACTIVITIES: CPT

## 2023-02-16 PROCEDURE — 94761 N-INVAS EAR/PLS OXIMETRY MLT: CPT

## 2023-02-16 PROCEDURE — 85007 BL SMEAR W/DIFF WBC COUNT: CPT | Performed by: HOSPITALIST

## 2023-02-16 PROCEDURE — 27000221 HC OXYGEN, UP TO 24 HOURS

## 2023-02-16 PROCEDURE — 25000003 PHARM REV CODE 250: Performed by: FAMILY MEDICINE

## 2023-02-16 PROCEDURE — 63600175 PHARM REV CODE 636 W HCPCS: Mod: TB,JG | Performed by: FAMILY MEDICINE

## 2023-02-16 PROCEDURE — 94640 AIRWAY INHALATION TREATMENT: CPT

## 2023-02-16 PROCEDURE — 97110 THERAPEUTIC EXERCISES: CPT

## 2023-02-16 PROCEDURE — 25000242 PHARM REV CODE 250 ALT 637 W/ HCPCS: Performed by: HOSPITALIST

## 2023-02-16 PROCEDURE — 36415 COLL VENOUS BLD VENIPUNCTURE: CPT | Performed by: HOSPITALIST

## 2023-02-16 PROCEDURE — 80053 COMPREHEN METABOLIC PANEL: CPT | Performed by: HOSPITALIST

## 2023-02-16 PROCEDURE — 99900035 HC TECH TIME PER 15 MIN (STAT)

## 2023-02-16 PROCEDURE — 94618 PULMONARY STRESS TESTING: CPT

## 2023-02-16 PROCEDURE — 27100171 HC OXYGEN HIGH FLOW UP TO 24 HOURS

## 2023-02-16 RX ORDER — TIZANIDINE 4 MG/1
4 TABLET ORAL EVERY 8 HOURS
Qty: 9 TABLET | Refills: 0 | Status: ON HOLD | OUTPATIENT
Start: 2023-02-16 | End: 2023-02-28 | Stop reason: HOSPADM

## 2023-02-16 RX ORDER — AMOXICILLIN 250 MG
1 CAPSULE ORAL 2 TIMES DAILY
Status: DISCONTINUED | OUTPATIENT
Start: 2023-02-16 | End: 2023-02-16 | Stop reason: HOSPADM

## 2023-02-16 RX ORDER — FLUCONAZOLE 100 MG/1
100 TABLET ORAL ONCE
Status: DISCONTINUED | OUTPATIENT
Start: 2023-02-16 | End: 2023-02-16 | Stop reason: HOSPADM

## 2023-02-16 RX ORDER — ONDANSETRON 4 MG/1
4 TABLET, ORALLY DISINTEGRATING ORAL EVERY 12 HOURS PRN
Qty: 10 TABLET | Refills: 0 | Status: SHIPPED | OUTPATIENT
Start: 2023-02-16 | End: 2023-02-16 | Stop reason: SDUPTHER

## 2023-02-16 RX ORDER — LEVOFLOXACIN 750 MG/1
750 TABLET ORAL DAILY
Qty: 2 TABLET | Refills: 0 | Status: SHIPPED | OUTPATIENT
Start: 2023-02-16 | End: 2023-02-18

## 2023-02-16 RX ORDER — ONDANSETRON 4 MG/1
4 TABLET, ORALLY DISINTEGRATING ORAL EVERY 12 HOURS PRN
Qty: 3 TABLET | Refills: 0 | Status: ON HOLD | OUTPATIENT
Start: 2023-02-16 | End: 2023-02-28 | Stop reason: HOSPADM

## 2023-02-16 RX ORDER — LEVOFLOXACIN 750 MG/1
750 TABLET ORAL DAILY
Status: DISCONTINUED | OUTPATIENT
Start: 2023-02-16 | End: 2023-02-16 | Stop reason: HOSPADM

## 2023-02-16 RX ORDER — POLYETHYLENE GLYCOL 3350 17 G/17G
17 POWDER, FOR SOLUTION ORAL 2 TIMES DAILY
Status: DISCONTINUED | OUTPATIENT
Start: 2023-02-16 | End: 2023-02-16 | Stop reason: HOSPADM

## 2023-02-16 RX ADMIN — MEROPENEM AND SODIUM CHLORIDE 500 MG: 500 INJECTION, SOLUTION INTRAVENOUS at 10:02

## 2023-02-16 RX ADMIN — LEVOFLOXACIN 750 MG: 750 TABLET, FILM COATED ORAL at 04:02

## 2023-02-16 RX ADMIN — ARFORMOTEROL TARTRATE 15 MCG: 15 SOLUTION RESPIRATORY (INHALATION) at 07:02

## 2023-02-16 RX ADMIN — TIZANIDINE 4 MG: 4 TABLET ORAL at 09:02

## 2023-02-16 RX ADMIN — HYDROCODONE BITARTRATE AND ACETAMINOPHEN 1 TABLET: 5; 325 TABLET ORAL at 10:02

## 2023-02-16 RX ADMIN — GABAPENTIN 400 MG: 400 CAPSULE ORAL at 09:02

## 2023-02-16 RX ADMIN — BUSPIRONE HYDROCHLORIDE 7.5 MG: 5 TABLET ORAL at 09:02

## 2023-02-16 RX ADMIN — HYDROCODONE BITARTRATE AND ACETAMINOPHEN 1 TABLET: 5; 325 TABLET ORAL at 04:02

## 2023-02-16 RX ADMIN — IPRATROPIUM BROMIDE 0.5 MG: 0.5 SOLUTION RESPIRATORY (INHALATION) at 01:02

## 2023-02-16 RX ADMIN — POLYETHYLENE GLYCOL 3350 17 G: 17 POWDER, FOR SOLUTION ORAL at 09:02

## 2023-02-16 RX ADMIN — HYDROCODONE BITARTRATE AND ACETAMINOPHEN 1 TABLET: 5; 325 TABLET ORAL at 03:02

## 2023-02-16 RX ADMIN — GABAPENTIN 400 MG: 400 CAPSULE ORAL at 04:02

## 2023-02-16 RX ADMIN — Medication 400 MG: at 09:02

## 2023-02-16 RX ADMIN — DILTIAZEM HYDROCHLORIDE 120 MG: 60 TABLET, FILM COATED ORAL at 09:02

## 2023-02-16 RX ADMIN — GABAPENTIN 400 MG: 400 CAPSULE ORAL at 12:02

## 2023-02-16 RX ADMIN — POTASSIUM BICARBONATE 25 MEQ: 977.5 TABLET, EFFERVESCENT ORAL at 09:02

## 2023-02-16 RX ADMIN — TIZANIDINE 4 MG: 4 TABLET ORAL at 02:02

## 2023-02-16 RX ADMIN — ACETAMINOPHEN 650 MG: 325 TABLET ORAL at 07:02

## 2023-02-16 RX ADMIN — LEVOTHYROXINE SODIUM 125 MCG: 125 TABLET ORAL at 05:02

## 2023-02-16 RX ADMIN — IPRATROPIUM BROMIDE 0.5 MG: 0.5 SOLUTION RESPIRATORY (INHALATION) at 07:02

## 2023-02-16 RX ADMIN — MEROPENEM AND SODIUM CHLORIDE 500 MG: 500 INJECTION, SOLUTION INTRAVENOUS at 05:02

## 2023-02-16 RX ADMIN — SENNOSIDES AND DOCUSATE SODIUM 1 TABLET: 50; 8.6 TABLET ORAL at 09:02

## 2023-02-16 RX ADMIN — CALCITRIOL CAPSULES 0.25 MCG 0.25 MCG: 0.25 CAPSULE ORAL at 09:02

## 2023-02-16 NOTE — PLAN OF CARE
[facility] - Telemetry (Hospital)  Discharge Final Note    Primary Care Provider: Eloy Hdez MD    Expected Discharge Date: 2/16/2023    Final Discharge Note (most recent)       Final Note - 02/16/23 1246          Final Note    Assessment Type Final Discharge Note     Anticipated Discharge Disposition Home-Health Care [facility]     Hospital Resources/Appts/Education Provided Post-Acute resouces added to AVS        Post-Acute Status    Discharge Delays None known at this time                   Pt to discharge home with [facility]. Pt declined SNF placement d/t co-pay costs that patient would be responsible for. [facility] rep met with patient at the bedside. Pt advised to have family bring portable O2 at discharge.     Pt sees non-[facility] PCP and specialists, pt will need to coordinate follow up.     No CM needs for discharge.      Important Message from Medicare              Follow-up providers       Eloy Hdez MD   Specialty: Family Medicine   Relationship: PCP - General    [address] [address]   Phone: [phone]       Next Steps: Schedule an appointment as soon as possible for a visit in 3 day(s)    Instructions: hospital follow up    Yessenia Bailey NP   Specialty: Family Medicine    [address]  [address] [facility]  [facility] [address]   Phone: [phone]       Next Steps: Schedule an appointment as soon as possible for a visit in 1 week(s)    Instructions: hospital follow up    Sara Guy MD   Specialty: Cardiology, Cardiovascular Disease    [address]  [place] [address]   Phone: [phone]       Next Steps: Schedule an appointment as soon as possible for a visit in 1 week(s)    Instructions: hospital follow up    Bella Yanez MD   Specialty: Hematology and Oncology    [address] [facility] [address]   Phone: [phone]       Next Steps: Schedule an appointment as soon as possible for a visit in 1 week(s)     Instructions: hospital follow up              After-discharge care                Home Medical Care       [facility]  [place]   Service: Home Health Services    [address]  [place] [address]   Phone: [phone]

## 2023-02-16 NOTE — RESPIRATORY THERAPY
Home Oxygen Evaluation - [facility] - Cardiopulmonary Department      Date Performed: 2023      1) Patient's Home O2 Sat on room air, while at rest: Room Air SpO2 At Rest: (!) 74 %        If O2 sats on room air at rest are 88% or below, patient qualifies.  Document O2 liter flow needed in Step 2.  If O2 sats are 89% or above, complete Step 3.        2)  If patient is not ambulated and O2 sats are <88%, what is the O2 liter flow required to meet ordered saturation?      If O2 sats on room air while exercising remain 89% or above patient does not qualify, no further testing needed Document N/A in step 3. If O2 sats on room air while exercising are 88% or below, continue to Step 4.    3) Patient's O2 Sat on room air while exercisin) Patient's O2 Sat while exercising on O2: SpO2 During Ambulation on O2: 92 % at Ambulation O2 LPM: 3 LPM         (Must show improvement from #4 for patients to qualify)

## 2023-02-16 NOTE — PLAN OF CARE
Continue OT POC.  Pt progressing with functional mobility and BUE strengthening.  SBA for bed mobility. CGA for t/fs with HHA.

## 2023-02-16 NOTE — DISCHARGE SUMMARY
[facility] - Telemetry ([facility])  [facility] Medicine  Discharge Summary      Patient Name: Christine Horner  MRN: [id number]  [facility]: [id number]  Patient Class: IP- Inpatient  Admission Date: 2/13/2023  Hospital Length of Stay: 3 days  Discharge Date and Time:  02/16/2023 11:19 AM  Attending Physician: Ike Allen MD   Discharging Provider: Ike Allen MD  Primary Care Provider: Eloy Hdez MD    Primary Care Team: Networked reference to record PCT     HPI:   Christine Horner is a 63 y.o. female with a PMH  has a past medical history of Arthritis, Chronic back pain, COPD (chronic obstructive pulmonary disease), COPD with acute exacerbation (04/24/2016), Pneumonia, and SOB (shortness of breath). who presented to the ED complaining of progressively worsening shortness a breath x1 week duration with acute worsening yesterday.  Patient reports being on chronic home O2 of 3 L via nasal cannula now requiring up titration of supplemental oxygen.  Patient denied endorsing any fever, chills, sweats, chest pain, abdominal pain, dysuria, melena, hematochezia, diarrhea, or onset neurological deficits however did report intermittent nausea with vomiting, dyspnea on exertion, bilateral for chest pain upon deep inspiration described as sharp/stabbing in nature, rated 8/10 in severity, in addition to dyspnea worsened upon lying flat.  She reported no other known alleviating or aggravating factors and reported all other review of systems negative except as noted above.  Patient also reported DuoNebs administered at home had little to no relief in symptoms prompting her to come to the ED to be further evaluated.  Patient also reported similar symptoms occurred when she was diagnosed with pneumonia requiring hospitalization and IV antibiotics.  Hospital Medicine consulted by ED staff for admission for continued treatment of pneumonia in setting of acute on chronic hypoxemic respiratory failure.     PCP: Eloy Hdez        * No  surgery found *      Hospital Course:   2/14 endorses symptom improvement with dyspnea, appetite and fatigue. Reports palpitations upon standing. Continue scheduled breathing treatments, intravenous antibiotic(s). Add acappella and incentive spirometer. Smoking cessation approximately 10 years ago. [facility] cll, followed by Dr. Prieto  2/15 at baseline supplemental oxygen. Continue intravenous antibiotic(s) h/o pseudomonas pneumonia. Leukocytosis trending down. Awaiting skilled nursing facility placement. Blood culture negative growth to date.     2/16  Remains at baseline supplemental oxygen. On intravenous merrem for pmh pseudomonas pneumonia. Tolerated well without allergic reaction. Transitioned to po levaquin prior to discharge. Blood culture remain negative growth to date. Vital signs stable. Ambulatory oxygen evaluation performed without any changes to baseline supplemental oxygen.     Speech evaluation performed for aspiration risk and concerns for dysphagia. Modified barium swallow performed without overt signs or symptoms of aspiration. Speech recommended continued therapy due to risk for aspiration with multiple comorbidities.    Physical/occupational therapy recommended skilled nursing facility placement but patient refused due to financial constraints. Patient agreeable to homehealth physical/occupational therapy and speech therapy.     Patient seen and evaluated by me. Patient was determined to be suitable for d/c. Patient deemed stable for discharge to home with homehealth physical/occupational therapy and speech therapy and nurse practitioner to visit home program.         Goals of Care Treatment Preferences:  Code Status: Full Code      Consults:   Consults (From admission, onward)        Status Ordering Provider     Inpatient consult to Social Work  Once        Provider:  (Not yet assigned)    MAGDALENO Spangler     Inpatient consult to Registered Dietitian/Nutritionist  Once        Provider:  (Not  yet assigned)    Completed MAGDALENO RYAN          No new Assessment & Plan notes have been filed under this hospital service since the last note was generated.  Service: Hospital Medicine    Final Active Diagnoses:    Diagnosis Date Noted POA    PRINCIPAL PROBLEM:  COPD, very severe [J44.9] 12/29/2015 Yes     Chronic    Paroxysmal SVT (supraventricular tachycardia) [I47.1] 02/14/2023 Yes    Postablative hypothyroidism [E89.0] 02/14/2023 Yes    CLL (chronic lymphocytic leukemia) [C91.10] 02/14/2023 Yes    Anemia [D64.9] 02/14/2023 Yes    Acute on chronic respiratory failure with hypoxia [J96.21] 04/24/2016 Yes    Centrilobular emphysema [J43.2] 12/29/2015 Yes     Chronic      Problems Resolved During this Admission:       Discharged Condition: stable    Disposition:     Follow Up:   Follow-up Information     Eloy Hdez MD. Schedule an appointment as soon as possible for a visit in 3 day(s).    Specialty: Family Medicine  Why: hospital follow up  Contact information:  [address] [facility] [address] [phone]             Yessenia Bailey NP. Schedule an appointment as soon as possible for a visit in 1 week(s).    Specialty: Family Medicine  Why: hospital follow up  Contact information:  [address] [place] [address] [phone]             Sara Guy MD. Schedule an appointment as soon as possible for a visit in 1 week(s).    Specialties: Cardiology, Cardiovascular Disease  Why: hospital follow up  Contact information:  [address] [facility]  [address]  [facility] [address] [phone]             Bella Yanez MD. Schedule an appointment as soon as possible for a visit in 1 week(s).    Specialty: Hematology and Oncology  Why: hospital follow up  Contact information:  [address] [facility] [address] [phone]                       Patient Instructions:   No discharge procedures on file.    Significant Diagnostic Studies: Labs:   CMP    Recent Labs   Lab 02/15/23  0533 02/16/23 0459    138   K 3.9 4.6   CL 99 95   CO2 26 28   GLU 82 89   BUN 8 8   CREATININE 0.7 0.6   CALCIUM 8.4* 8.2*   PROT 5.8* 6.0   ALBUMIN 2.8* 2.8*   BILITOT 0.3 0.2   ALKPHOS 129 143*   AST 45* 49*   ALT 26 25   ANIONGAP 13 15   , CBC   Recent Labs   Lab 02/15/23  0533 02/16/23 0459   WBC 27.67* 29.00*   HGB 9.2* 9.7*   HCT 30.5* 33.3*   * 532*    and All labs within the past 24 hours have been reviewed  Microbiology:   Blood Culture   Lab Results   Component Value Date    LABBLOO No Growth to date 02/13/2023    LABBLOO No Growth to date 02/13/2023    LABBLOO No Growth to date 02/13/2023    and flu negative   Radiology: X-Ray: CXR: portable  Modified barium swallow    Pending Diagnostic Studies:     None         Medications:  Reconciled Home Medications:      Medication List      ASK your doctor about these medications    ADVAIR DISKUS 250-50 mcg/dose diskus inhaler  Generic drug: fluticasone-salmeterol 250-50 mcg/dose  USE 1 INHALATION TWICE DAILY **THANK YOU**     * albuterol 2.5 mg /3 mL (0.083 %) nebulizer solution  Commonly known as: PROVENTIL  INHALE 1 VIAL VIA NEBULIZER EVERY 4 HOURS AS NEEDED **THANK YOU**     * albuterol 90 mcg/actuation inhaler  Commonly known as: PROAIR HFA  INHALE TWO PUFFS EVERY 4 TO 6 HOURS **THANK YOU**     busPIRone 7.5 MG tablet  Commonly known as: BUSPAR  Take 7.5 mg by mouth 2 (two) times a day.     calcitRIOL 0.25 MCG Cap  Commonly known as: ROCALTROL  Take 0.25 mcg by mouth once daily.     calcium carbonate-magnesium hydroxide 550-110 mg Chew  Commonly known as: ROLAIDS  Take 2,000 mg by mouth 3 (three) times daily as needed.     diltiaZEM 120 MG tablet  Commonly known as: CARDIZEM  Take 120 mg by mouth 2 (two) times a day.     fluticasone propionate 50 mcg/actuation nasal spray  Commonly known as: FLONASE  2 sprays by Each Nare route 2 (two) times daily.     gabapentin 400 MG capsule  Commonly known as: NEURONTIN  Take  by mouth 4 (four) times daily.     levothyroxine 125 MCG tablet  Commonly known as: SYNTHROID  Take 125 mcg by mouth before breakfast.     montelukast 10 mg tablet  Commonly known as: SINGULAIR  Take 10 mg by mouth once daily.     tiotropium 18 mcg inhalation capsule  Commonly known as: SPIRIVA  Inhale 18 mcg into the lungs once daily. Controller     tiotropium-olodateroL 2.5-2.5 mcg/actuation Mist  Commonly known as: STIOLTO RESPIMAT  Inhale 2 puffs into the lungs once daily.     tiZANidine 4 MG tablet  Commonly known as: ZANAFLEX  Take 1 tablet by mouth 3 (three) times daily.         * This list has 2 medication(s) that are the same as other medications prescribed for you. Read the directions carefully, and ask your doctor or other care provider to review them with you.                Indwelling Lines/Drains at time of discharge:   Lines/Drains/Airways     None                 Time spent on the discharge of patient: 51 minutes         Ike Allen MD  Department of Hospital Medicine  [facility] - Telemetry ([facility])

## 2023-02-16 NOTE — NURSING
POC reviewed with patient. Pt verbalized understanding  C/o pain to back. Moderately controlled by PRN meds and relaxation techniques.   AAO x4. VSS  NR on tele monitor.   PIV saline locked, clean, dry, intact  IV antibiotics maintained  Oxygen at 3.5 L NC  No other c/o at this time.  Pt remains free of injuries and falls; fall precaution in place.   Bed low, side rails x2, call light in reach, personal belongings at bedside, bed alarm in use  Reminded to call for assistance.  Hourly rounding complete. 12 hr chart check complete. Will continue to monitor.

## 2023-02-16 NOTE — PT/OT/SLP PROGRESS
Speech Language Pathology Treatment    Patient Name:  Christine Horner   MRN:  [id number]  Admitting Diagnosis: COPD, very severe    Recommendations:                 General Recommendations:  Dysphagia therapy  Diet recommendations:  Regular Diet - IDDSI Level 7, Liquid Diet Level: Thin liquids - IDDSI Level 0   Aspiration Precautions: 1 bite/sip at a time, Assistance with meals, Avoid talking while eating, Double swallow with each bite/sip, Feed only when awake/alert, Frequent oral care, HOB to 90 degrees, Meds whole 1 at a time, Remain upright 30 minutes post meal, Small bites/sips, and Standard aspiration precautions   General Precautions: Standard, aspiration  Communication strategies:  none    Subjective     Pt seen bedside for ST.  No c/o pain.  Pt d/c home today with continued HH ST for dysphagia, PT and OT.  Patient goals: To eat regular diet despite risks    Pain/Comfort:  Pain Rating 1: 0/10  Pain Rating Post-Intervention 1: 0/10  Pain Rating 2: 0/10  Pain Rating Post-Intervention 2: 0/10    Respiratory Status: Nasal cannula, flow 3.5 L/min    Objective:     Has the patient been evaluated by SLP for swallowing?   Yes  Keep patient NPO? No   Current Respiratory Status: Nasal cannula    Pt educated on oral care and need for aggressive oral hygiene s/t confirmed dysphagia/aspiration risks and compromised immune system.      Compensatory strategies discussed and demonstrated during tx feeds.  Again, pt stated she will continue regular diet, despite risks.    Pt completed effortful swallows and Mendelsohn maneuver during session today with mod. Assist.      Assessment:     Christine Horner is a 63 y.o. female with an SLP diagnosis of Dysphagia.  She presents with confirmed pharyngeal dysphagia s/p MBSS (2/15/23) with increased risk s/t cardiac and pulmonary comorbidites, specifically including severe COPD, emphysema and recurrent PNA. She is recommended for IDDSI 7 (regular solids) with IDDSI 0 (thin liquids),  following aspiration precautions and use of compensatory strategies.  Pt has stated she will continue regular diet, despite medical risks.  Pt recommended to continue SLP intervention post-acute for pharyngeal strengthening/dysphagia tx.    Goals:   Multidisciplinary Problems       SLP Goals          Problem: SLP    Goal Priority Disciplines Outcome   SLP Goal     SLP Ongoing, Progressing   Description: 1.  Pt will consume a regular consistency diet/thin liquids with use of compensatory strategies, following aspiration precautions.  2.  Pt will initiate aggressive oral care/hygiene practices into ADL's.  3.  MBSS as comprehensive swallowing assessment and STG's to follow as clinically indicated.--MET. Rec: IDDSI 7/0, aspiration precautions and compensatory strategies.  4.  Pt will complete pharyngeal strengthening exercises with repetitions as tolerated:  *Chin Tuck Against Resistance (CTAR)  *Supraglottic Swallow  *Mendelsohn  *Effortful swallows                       Plan:     Patient to be seen:  2 x/week, 3 x/week   Plan of Care expires:  02/22/23  Plan of Care reviewed with:  patient   SLP Follow-Up:  Yes       Discharge recommendations:  nursing facility, skilled (Pt denied SNF recs and has chosen  on d/c.)   Barriers to Discharge:  None    Time Tracking:     SLP Treatment Date:   02/16/23  Speech Start Time:  0930  Speech Stop Time:  1000     Speech Total Time (min):  30 min    Billable Minutes: Treatment Swallowing Dysfunction 15 minutes and Self Care/Home Management Training 15 minutes    02/16/2023

## 2023-02-16 NOTE — PT/OT/SLP PROGRESS
Occupational Therapy   Treatment    Name: Christine Horner  MRN: [id number]  Admitting Diagnosis:  COPD, very severe       Recommendations:     Discharge Recommendations: nursing facility, skilled  Discharge Equipment Recommendations:  none  Barriers to discharge:       Assessment:     Christine Horner is a 63 y.o. female with a medical diagnosis of COPD, very severe.  She presents with the following performance deficits affecting function are weakness, impaired endurance, impaired self care skills, impaired functional mobility, gait instability, impaired balance, decreased coordination, decreased upper extremity function, decreased lower extremity function, decreased safety awareness, impaired cardiopulmonary response to activity.     Rehab Prognosis:  Fair; patient would benefit from acute skilled OT services to address these deficits and reach maximum level of function.       Plan:     Patient to be seen 2 x/week to address the above listed problems via self-care/home management, therapeutic activities, therapeutic exercises  Plan of Care Expires: 02/28/23  Plan of Care Reviewed with: patient    Subjective     Pain/Comfort:  Pain Rating 1: 0/10    Objective:     Communicated with: Nurse and epic chart review prior to session.  Patient found HOB elevated with peripheral IV, telemetry, oxygen upon OT entry to room.    General Precautions: Standard, fall    Orthopedic Precautions:N/A  Braces: N/A  Respiratory Status: Nasal cannula, flow 3 L/min     Occupational Performance:     Bed Mobility:    Patient completed Rolling/Turning to Right with stand by assistance  Patient completed Scooting/Bridging with stand by assistance  Patient completed Supine to Sit with stand by assistance     Functional Mobility/Transfers:  Patient completed Sit <> Stand Transfer with contact guard assistance  with  hand-held assist   Patient completed Bed <> Chair Transfer using Stand Pivot technique with contact guard assistance with  hand-held assist    Activities of Daily Living:  Feeding:  modified independence pt taking medication and drinking through straw, able to bring items to mouth    AMPAC 6 Click ADL: 15    Treatment & Education:  Pt educated on and performed x8 reps BUE AROM therex in chair:  Shoulder flexion  Elbow flexion/ext  Digit flexion/ext  Pt requiring rest breaks between therex sets. Pt with increased SOB with exertion; educated pt on pursed lip breathing technique and importance of activity pacing. Educated on techniques to use to increase independence and decrease fall risk with functional transfers. Educated on importance of OOB activity and calling for A to transfer back to bed/meet needs. Encouraged completion of B UE AROM therex throughout the day to tolerance to increase functional strength and activity tolerance. Educated patient on importance of increased tolerance to upright position and direct impact on CV endurance and strength. Patient encouraged to sit up in chair for a minimum of 2 consecutive hours per day. Patient stated understanding and in agreement with POC.     Patient left up in chair with all lines intact, call button in reach, chair alarm on, and nurse notified    GOALS:   Multidisciplinary Problems       Occupational Therapy Goals          Problem: Occupational Therapy    Goal Priority Disciplines Outcome Interventions   Occupational Therapy Goal     OT, PT/OT Ongoing, Progressing    Description: O.T. GOALS TO BE MET BY 10-21-23  MIN A WITH UE DRESSING  PT WILL TOLERATE 1 SET X 8 REPS B UE ROM EXERCISE  SBA WITH [facility] TRANSFER                         Time Tracking:     OT Date of Treatment: 02/16/23  OT Start Time: 0900  OT Stop Time: 0925  OT Total Time (min): 25 min    Billable Minutes:Therapeutic Activity 15  Therapeutic Exercise 10    OT/RACHELE: LAURA Rondon OT     2/16/2023

## 2023-02-16 NOTE — PT/OT/SLP PROGRESS
Physical Therapy  Treatment    Christine Horner   MRN: [id number]   Admitting Diagnosis: COPD, very severe    PT Received On: 02/16/23  PT Start Time: 1000     PT Stop Time: 1025    PT Total Time (min): 25 min       Billable Minutes:  Therapeutic Activity 15 and Therapeutic Exercise 10    Treatment Type: Treatment  PT/PTA: PT     PTA Visit Number: 0       General Precautions: Standard, fall  Orthopedic Precautions: N/A  Braces: N/A  Respiratory Status: High flow, flow 3 L/min         Subjective:  Communicated with NURSE CABRERA AND [place] [facility] CHART REVIEW  prior to session.   PT AGREED TO TX  ON 2ND ATTEMPT    Pain/Comfort  Pain Rating 1: 0/10  Pain Rating Post-Intervention 1: 0/10    Objective:   Patient found with: peripheral IV, telemetry, oxygen    Functional Mobility:  PT MET IN  AGREED TO TX AFTER MEDS GIVEN. PT SUP>SIT EOB WITH S. PT SCOOTED TO EOB AND STOOD WITH NO AD AND CGA FOR STAND PIVOT T/F TO CHAIR WITH CGA. PT SEATED FOR REST AND COMPLETED B LE TE X 8 REPS OF AP, TKE, AND MIP. PT LEFT SEATED WITH ALL NEEDS MET AND CALL BELL IN REACH.       AM-PAC 6 CLICK MOBILITY  How much help from another person does this patient currently need?   1 = Unable, Total/Dependent Assistance  2 = A lot, Maximum/Moderate Assistance  3 = A little, Minimum/Contact Guard/Supervision  4 = None, Modified [employer]/Independent    Turning over in bed (including adjusting bedclothes, sheets and blankets)?: 4  Sitting down on and standing up from a chair with arms (e.g., wheelchair, bedside commode, etc.): 4  Moving from lying on back to sitting on the side of the bed?: 4  Moving to and from a bed to a chair (including a wheelchair)?: 3  Need to walk in hospital room?: 1  Climbing 3-5 steps with a railing?: 1  Basic Mobility Total Score: 17    AM-PAC Raw Score CMS G-Code Modifier Level of Impairment Assistance   6 % Total / Unable   7 - 9 CM 80 - 100% Maximal Assist   10 - 14 CL 60 - 80% Moderate Assist   15 - 19 CK 40 - 60%  Moderate Assist   20 - 22 CJ 20 - 40% Minimal Assist   23 CI 1-20% SBA / CGA   24 CH 0% Independent/ Mod I     Patient left up in chair with call button in reach.    Assessment:  PT REANNA MIN TX .     Rehab identified problem list/impairments: weakness, impaired endurance, impaired self care skills, impaired functional mobility, gait instability, impaired balance, decreased safety awareness, decreased lower extremity function, decreased ROM, impaired cardiopulmonary response to activity    Rehab potential is good.    Activity tolerance: Fair    Discharge recommendations: nursing facility, skilled      Barriers to discharge:      Equipment recommendations: none     GOALS:   Multidisciplinary Problems       Physical Therapy Goals          Problem: Physical Therapy    Goal Priority Disciplines Outcome Goal Variances Interventions   Physical Therapy Goal     PT, PT/OT Ongoing, Progressing     Description: LT23  1. PT WILL COMPLETE BED MOBILITY IND  2. PT WILL STAND T/F TO CHAIR WITH RW AND S.  3. PT WILL GT TRAIN X 10' WITH RW AND MIN A                         PLAN:    Patient to be seen 3 x/week to address the above listed problems via therapeutic activities, therapeutic exercises  Plan of Care expires: 23  Plan of Care reviewed with: patient         2023

## 2023-02-16 NOTE — PLAN OF CARE
Problem: Adult Inpatient Plan of Care  Goal: Plan of Care Review  Outcome: Ongoing, Progressing  Flowsheets (Taken 2/16/2023 0535)  Plan of Care Reviewed With: patient  Goal: Patient-Specific Goal (Individualized)  Outcome: Ongoing, Progressing  Goal: Absence of Hospital-Acquired Illness or Injury  Outcome: Ongoing, Progressing  Goal: Optimal Comfort and Wellbeing  Outcome: Ongoing, Progressing  Goal: Readiness for Transition of Care  Outcome: Ongoing, Progressing     Problem: Fluid Imbalance (Pneumonia)  Goal: Fluid Balance  Outcome: Ongoing, Progressing  Intervention: Monitor and Manage Fluid Balance  Flowsheets (Taken 2/16/2023 0572)  Fluid/Electrolyte Management: oral rehydration therapy initiated     Problem: Infection (Pneumonia)  Goal: Resolution of Infection Signs and Symptoms  Outcome: Ongoing, Progressing     Problem: Skin Injury Risk Increased  Goal: Skin Health and Integrity  Outcome: Ongoing, Progressing

## 2023-02-16 NOTE — DISCHARGE INSTRUCTIONS
Homehealth with physical/occupational therapy order has been ordered. Someone will be in contact.    A nurse practitioner may be contacting you to assess your status post-hospitalization.

## 2023-02-17 ENCOUNTER — PATIENT OUTREACH (OUTPATIENT)
Dept: ADMINISTRATIVE | Facility: CLINIC | Age: 63
End: 2023-02-17
Payer: COMMERCIAL

## 2023-02-17 NOTE — NURSING
Discharge instructions received and reviewed with pt and family at bedside.  Pt voiced understanding and all questions answered to satisfaction.  Stressed importance to making and keeping all follow up appointments.  Medications delivered to bedside.  Tele monitor removed and brought to monitor tech.  IV d/c'd with tip intact, pressure dressing applied.  Pt waiting on  to betransported to front of hospital via w/c to be discharged home.

## 2023-02-17 NOTE — PROGRESS NOTES
C3 nurse attempted to contact Christine Horner  for a TCC post hospital discharge follow up call. No answer. The patient does not have a scheduled HOSFU appointment, and the pt does not have an [facility] PCP.

## 2023-02-19 LAB
BACTERIA BLD CULT: NORMAL
BACTERIA BLD CULT: NORMAL

## 2023-02-20 ENCOUNTER — HOSPITAL ENCOUNTER (INPATIENT)
Facility: HOSPITAL | Age: 63
LOS: 8 days | Discharge: HOME HEALTH | DRG: 193 | End: 2023-02-28
Attending: EMERGENCY MEDICINE | Admitting: INTERNAL MEDICINE
Payer: COMMERCIAL

## 2023-02-20 DIAGNOSIS — J44.9 CHRONIC OBSTRUCTIVE PULMONARY DISEASE, UNSPECIFIED COPD TYPE: ICD-10-CM

## 2023-02-20 DIAGNOSIS — J18.9 PNEUMONIA INVOLVING RIGHT LUNG: ICD-10-CM

## 2023-02-20 DIAGNOSIS — J18.9 PNEUMONIA OF RIGHT MIDDLE LOBE DUE TO INFECTIOUS ORGANISM: ICD-10-CM

## 2023-02-20 DIAGNOSIS — R16.0 LIVER MASS: ICD-10-CM

## 2023-02-20 DIAGNOSIS — C91.10 CLL (CHRONIC LYMPHOCYTIC LEUKEMIA): ICD-10-CM

## 2023-02-20 DIAGNOSIS — J96.21 ACUTE ON CHRONIC RESPIRATORY FAILURE WITH HYPOXEMIA: Primary | ICD-10-CM

## 2023-02-20 DIAGNOSIS — R06.02 SOB (SHORTNESS OF BREATH): ICD-10-CM

## 2023-02-20 DIAGNOSIS — E83.42 HYPOMAGNESEMIA: ICD-10-CM

## 2023-02-20 DIAGNOSIS — R00.0 TACHYCARDIA: ICD-10-CM

## 2023-02-20 LAB
ALBUMIN SERPL BCP-MCNC: 3 G/DL (ref 3.5–5.2)
ALLENS TEST: ABNORMAL
ALLENS TEST: ABNORMAL
ALP SERPL-CCNC: 149 U/L (ref 55–135)
ALT SERPL W/O P-5'-P-CCNC: 18 U/L (ref 10–44)
AMMONIA PLAS-SCNC: 25 UMOL/L (ref 10–50)
AMPHET+METHAMPHET UR QL: NEGATIVE
ANION GAP SERPL CALC-SCNC: 14 MMOL/L (ref 8–16)
ANISOCYTOSIS BLD QL SMEAR: SLIGHT
AST SERPL-CCNC: 61 U/L (ref 10–40)
BACTERIA #/AREA URNS HPF: NORMAL /HPF
BARBITURATES UR QL SCN>200 NG/ML: NEGATIVE
BASOPHILS NFR BLD: 0 % (ref 0–1.9)
BENZODIAZ UR QL SCN>200 NG/ML: NEGATIVE
BILIRUB SERPL-MCNC: 0.5 MG/DL (ref 0.1–1)
BILIRUB UR QL STRIP: NEGATIVE
BUN SERPL-MCNC: 7 MG/DL (ref 8–23)
BZE UR QL SCN: NEGATIVE
CALCIUM SERPL-MCNC: 7.7 MG/DL (ref 8.7–10.5)
CANNABINOIDS UR QL SCN: NEGATIVE
CHLORIDE SERPL-SCNC: 87 MMOL/L (ref 95–110)
CLARITY UR: CLEAR
CO2 SERPL-SCNC: 34 MMOL/L (ref 23–29)
COLOR UR: YELLOW
CREAT SERPL-MCNC: 0.7 MG/DL (ref 0.5–1.4)
CREAT UR-MCNC: 26.8 MG/DL (ref 15–325)
CTP QC/QA: YES
D DIMER PPP IA.FEU-MCNC: 3.22 MG/L FEU
DELSYS: ABNORMAL
DELSYS: ABNORMAL
DIFFERENTIAL METHOD: ABNORMAL
EOSINOPHIL NFR BLD: 4 % (ref 0–8)
ERYTHROCYTE [DISTWIDTH] IN BLOOD BY AUTOMATED COUNT: 15.1 % (ref 11.5–14.5)
EST. GFR  (NO RACE VARIABLE): >60 ML/MIN/1.73 M^2
FLOW: 3
FLOW: 4
GLUCOSE SERPL-MCNC: 75 MG/DL (ref 70–110)
GLUCOSE UR QL STRIP: NEGATIVE
HCO3 UR-SCNC: 39.5 MMOL/L (ref 24–28)
HCO3 UR-SCNC: 43.5 MMOL/L (ref 24–28)
HCT VFR BLD AUTO: 32.8 % (ref 37–48.5)
HCV AB SERPL QL IA: NEGATIVE
HEP C VIRUS HOLD SPECIMEN: NORMAL
HGB BLD-MCNC: 10 G/DL (ref 12–16)
HGB UR QL STRIP: NEGATIVE
HIV 1+2 AB+HIV1 P24 AG SERPL QL IA: NEGATIVE
IMM GRANULOCYTES # BLD AUTO: ABNORMAL K/UL (ref 0–0.04)
IMM GRANULOCYTES NFR BLD AUTO: ABNORMAL % (ref 0–0.5)
KETONES UR QL STRIP: NEGATIVE
LACTATE SERPL-SCNC: 1.5 MMOL/L (ref 0.5–2.2)
LEUKOCYTE ESTERASE UR QL STRIP: ABNORMAL
LYMPHOCYTES NFR BLD: 16 % (ref 18–48)
MAGNESIUM SERPL-MCNC: 1.4 MG/DL (ref 1.6–2.6)
MCH RBC QN AUTO: 27 PG (ref 27–31)
MCHC RBC AUTO-ENTMCNC: 30.5 G/DL (ref 32–36)
MCV RBC AUTO: 89 FL (ref 82–98)
METHADONE UR QL SCN>300 NG/ML: NEGATIVE
MICROSCOPIC COMMENT: NORMAL
MODE: ABNORMAL
MODE: ABNORMAL
MONOCYTES NFR BLD: 4 % (ref 4–15)
NEUTROPHILS NFR BLD: 75 % (ref 38–73)
NEUTS BAND NFR BLD MANUAL: 1 %
NITRITE UR QL STRIP: NEGATIVE
NRBC BLD-RTO: 0 /100 WBC
OPIATES UR QL SCN: ABNORMAL
OVALOCYTES BLD QL SMEAR: ABNORMAL
PCO2 BLDA: 60.1 MMHG (ref 35–45)
PCO2 BLDA: 73.4 MMHG (ref 35–45)
PCP UR QL SCN>25 NG/ML: NEGATIVE
PH SMN: 7.38 [PH] (ref 7.35–7.45)
PH SMN: 7.42 [PH] (ref 7.35–7.45)
PH UR STRIP: 7 [PH] (ref 5–8)
PLATELET # BLD AUTO: 383 K/UL (ref 150–450)
PLATELET BLD QL SMEAR: ABNORMAL
PMV BLD AUTO: 9.1 FL (ref 9.2–12.9)
PO2 BLDA: 122 MMHG (ref 80–100)
PO2 BLDA: 72 MMHG (ref 80–100)
POC BE: 15 MMOL/L
POC BE: 18 MMOL/L
POC SATURATED O2: 94 % (ref 95–100)
POC SATURATED O2: 98 % (ref 95–100)
POIKILOCYTOSIS BLD QL SMEAR: SLIGHT
POTASSIUM SERPL-SCNC: 4.5 MMOL/L (ref 3.5–5.1)
PROT SERPL-MCNC: 6.6 G/DL (ref 6–8.4)
PROT UR QL STRIP: NEGATIVE
RBC # BLD AUTO: 3.7 M/UL (ref 4–5.4)
SAMPLE: ABNORMAL
SAMPLE: ABNORMAL
SARS-COV-2 RDRP RESP QL NAA+PROBE: NEGATIVE
SITE: ABNORMAL
SITE: ABNORMAL
SMUDGE CELLS BLD QL SMEAR: PRESENT
SODIUM SERPL-SCNC: 135 MMOL/L (ref 136–145)
SP GR UR STRIP: 1.01 (ref 1–1.03)
SQUAMOUS #/AREA URNS HPF: 1 /HPF
STOMATOCYTES BLD QL SMEAR: PRESENT
TARGETS BLD QL SMEAR: ABNORMAL
TOXICOLOGY INFORMATION: ABNORMAL
TROPONIN I SERPL DL<=0.01 NG/ML-MCNC: <0.006 NG/ML (ref 0–0.03)
TSH SERPL DL<=0.005 MIU/L-ACNC: 3.68 UIU/ML (ref 0.4–4)
URN SPEC COLLECT METH UR: ABNORMAL
UROBILINOGEN UR STRIP-ACNC: NEGATIVE EU/DL
WBC # BLD AUTO: 30.84 K/UL (ref 3.9–12.7)
WBC #/AREA URNS HPF: 3 /HPF (ref 0–5)

## 2023-02-20 PROCEDURE — 96365 THER/PROPH/DIAG IV INF INIT: CPT | Mod: 59

## 2023-02-20 PROCEDURE — 85007 BL SMEAR W/DIFF WBC COUNT: CPT | Performed by: EMERGENCY MEDICINE

## 2023-02-20 PROCEDURE — 83605 ASSAY OF LACTIC ACID: CPT | Performed by: EMERGENCY MEDICINE

## 2023-02-20 PROCEDURE — 84484 ASSAY OF TROPONIN QUANT: CPT | Performed by: EMERGENCY MEDICINE

## 2023-02-20 PROCEDURE — 25000003 PHARM REV CODE 250: Performed by: EMERGENCY MEDICINE

## 2023-02-20 PROCEDURE — 93010 EKG 12-LEAD: ICD-10-PCS | Mod: ,,, | Performed by: INTERNAL MEDICINE

## 2023-02-20 PROCEDURE — 94640 AIRWAY INHALATION TREATMENT: CPT

## 2023-02-20 PROCEDURE — 99900035 HC TECH TIME PER 15 MIN (STAT)

## 2023-02-20 PROCEDURE — 11000001 HC ACUTE MED/SURG PRIVATE ROOM

## 2023-02-20 PROCEDURE — 96366 THER/PROPH/DIAG IV INF ADDON: CPT

## 2023-02-20 PROCEDURE — 85027 COMPLETE CBC AUTOMATED: CPT | Performed by: EMERGENCY MEDICINE

## 2023-02-20 PROCEDURE — 63600175 PHARM REV CODE 636 W HCPCS: Performed by: EMERGENCY MEDICINE

## 2023-02-20 PROCEDURE — 25500020 PHARM REV CODE 255: Performed by: EMERGENCY MEDICINE

## 2023-02-20 PROCEDURE — 87389 HIV-1 AG W/HIV-1&-2 AB AG IA: CPT | Performed by: EMERGENCY MEDICINE

## 2023-02-20 PROCEDURE — 27000221 HC OXYGEN, UP TO 24 HOURS

## 2023-02-20 PROCEDURE — 96361 HYDRATE IV INFUSION ADD-ON: CPT

## 2023-02-20 PROCEDURE — 36600 WITHDRAWAL OF ARTERIAL BLOOD: CPT

## 2023-02-20 PROCEDURE — 80307 DRUG TEST PRSMV CHEM ANLYZR: CPT | Performed by: EMERGENCY MEDICINE

## 2023-02-20 PROCEDURE — 80053 COMPREHEN METABOLIC PANEL: CPT | Performed by: EMERGENCY MEDICINE

## 2023-02-20 PROCEDURE — 94761 N-INVAS EAR/PLS OXIMETRY MLT: CPT

## 2023-02-20 PROCEDURE — 93005 ELECTROCARDIOGRAM TRACING: CPT

## 2023-02-20 PROCEDURE — 25000242 PHARM REV CODE 250 ALT 637 W/ HCPCS: Performed by: INTERNAL MEDICINE

## 2023-02-20 PROCEDURE — 86803 HEPATITIS C AB TEST: CPT | Performed by: EMERGENCY MEDICINE

## 2023-02-20 PROCEDURE — 82140 ASSAY OF AMMONIA: CPT | Performed by: EMERGENCY MEDICINE

## 2023-02-20 PROCEDURE — 93010 ELECTROCARDIOGRAM REPORT: CPT | Mod: ,,, | Performed by: INTERNAL MEDICINE

## 2023-02-20 PROCEDURE — 85379 FIBRIN DEGRADATION QUANT: CPT | Performed by: EMERGENCY MEDICINE

## 2023-02-20 PROCEDURE — 63600175 PHARM REV CODE 636 W HCPCS: Performed by: INTERNAL MEDICINE

## 2023-02-20 PROCEDURE — 84443 ASSAY THYROID STIM HORMONE: CPT | Performed by: EMERGENCY MEDICINE

## 2023-02-20 PROCEDURE — 25000003 PHARM REV CODE 250: Performed by: INTERNAL MEDICINE

## 2023-02-20 PROCEDURE — 83735 ASSAY OF MAGNESIUM: CPT | Performed by: EMERGENCY MEDICINE

## 2023-02-20 PROCEDURE — 99291 CRITICAL CARE FIRST HOUR: CPT | Mod: 25

## 2023-02-20 PROCEDURE — 87040 BLOOD CULTURE FOR BACTERIA: CPT | Performed by: EMERGENCY MEDICINE

## 2023-02-20 PROCEDURE — 82803 BLOOD GASES ANY COMBINATION: CPT

## 2023-02-20 PROCEDURE — 96375 TX/PRO/DX INJ NEW DRUG ADDON: CPT

## 2023-02-20 PROCEDURE — 81000 URINALYSIS NONAUTO W/SCOPE: CPT | Mod: 59 | Performed by: EMERGENCY MEDICINE

## 2023-02-20 PROCEDURE — 25000242 PHARM REV CODE 250 ALT 637 W/ HCPCS: Performed by: EMERGENCY MEDICINE

## 2023-02-20 RX ORDER — IPRATROPIUM BROMIDE 0.5 MG/2.5ML
0.5 SOLUTION RESPIRATORY (INHALATION) EVERY 6 HOURS
Status: DISCONTINUED | OUTPATIENT
Start: 2023-02-21 | End: 2023-02-23

## 2023-02-20 RX ORDER — MAGNESIUM SULFATE HEPTAHYDRATE 40 MG/ML
2 INJECTION, SOLUTION INTRAVENOUS
Status: COMPLETED | OUTPATIENT
Start: 2023-02-20 | End: 2023-02-20

## 2023-02-20 RX ORDER — DIPHENHYDRAMINE HYDROCHLORIDE 50 MG/ML
25 INJECTION INTRAMUSCULAR; INTRAVENOUS
Status: COMPLETED | OUTPATIENT
Start: 2023-02-20 | End: 2023-02-20

## 2023-02-20 RX ORDER — GABAPENTIN 400 MG/1
400 CAPSULE ORAL 4 TIMES DAILY
Status: DISCONTINUED | OUTPATIENT
Start: 2023-02-20 | End: 2023-02-28 | Stop reason: HOSPADM

## 2023-02-20 RX ORDER — MONTELUKAST SODIUM 10 MG/1
10 TABLET ORAL DAILY
Status: DISCONTINUED | OUTPATIENT
Start: 2023-02-21 | End: 2023-02-28 | Stop reason: HOSPADM

## 2023-02-20 RX ORDER — ENOXAPARIN SODIUM 100 MG/ML
40 INJECTION SUBCUTANEOUS EVERY 24 HOURS
Status: DISCONTINUED | OUTPATIENT
Start: 2023-02-20 | End: 2023-02-28 | Stop reason: HOSPADM

## 2023-02-20 RX ORDER — DILTIAZEM HYDROCHLORIDE 120 MG/1
120 CAPSULE, COATED, EXTENDED RELEASE ORAL 2 TIMES DAILY
Status: DISCONTINUED | OUTPATIENT
Start: 2023-02-20 | End: 2023-02-28 | Stop reason: HOSPADM

## 2023-02-20 RX ORDER — FLUTICASONE FUROATE AND VILANTEROL 200; 25 UG/1; UG/1
1 POWDER RESPIRATORY (INHALATION) DAILY
Status: DISCONTINUED | OUTPATIENT
Start: 2023-02-21 | End: 2023-02-20

## 2023-02-20 RX ORDER — TIZANIDINE 4 MG/1
4 TABLET ORAL ONCE
Status: COMPLETED | OUTPATIENT
Start: 2023-02-20 | End: 2023-02-20

## 2023-02-20 RX ORDER — SODIUM CHLORIDE 0.9 % (FLUSH) 0.9 %
10 SYRINGE (ML) INJECTION EVERY 12 HOURS PRN
Status: DISCONTINUED | OUTPATIENT
Start: 2023-02-20 | End: 2023-02-28 | Stop reason: HOSPADM

## 2023-02-20 RX ORDER — DOXYCYCLINE HYCLATE 100 MG
100 TABLET ORAL EVERY 12 HOURS
Status: COMPLETED | OUTPATIENT
Start: 2023-02-20 | End: 2023-02-27

## 2023-02-20 RX ORDER — IPRATROPIUM BROMIDE AND ALBUTEROL SULFATE 2.5; .5 MG/3ML; MG/3ML
3 SOLUTION RESPIRATORY (INHALATION)
Status: DISCONTINUED | OUTPATIENT
Start: 2023-02-20 | End: 2023-02-20

## 2023-02-20 RX ORDER — LEVALBUTEROL INHALATION SOLUTION 0.63 MG/3ML
0.63 SOLUTION RESPIRATORY (INHALATION)
Status: COMPLETED | OUTPATIENT
Start: 2023-02-20 | End: 2023-02-20

## 2023-02-20 RX ORDER — LEVOTHYROXINE SODIUM 125 UG/1
125 TABLET ORAL
Status: DISCONTINUED | OUTPATIENT
Start: 2023-02-21 | End: 2023-02-28 | Stop reason: HOSPADM

## 2023-02-20 RX ORDER — MEROPENEM AND SODIUM CHLORIDE 500 MG/50ML
500 INJECTION, SOLUTION INTRAVENOUS
Status: DISPENSED | OUTPATIENT
Start: 2023-02-20 | End: 2023-02-27

## 2023-02-20 RX ORDER — LEVALBUTEROL INHALATION SOLUTION 0.63 MG/3ML
0.63 SOLUTION RESPIRATORY (INHALATION) EVERY 8 HOURS PRN
Status: DISCONTINUED | OUTPATIENT
Start: 2023-02-20 | End: 2023-02-28 | Stop reason: HOSPADM

## 2023-02-20 RX ORDER — ARFORMOTEROL TARTRATE 15 UG/2ML
15 SOLUTION RESPIRATORY (INHALATION) 2 TIMES DAILY
Status: DISCONTINUED | OUTPATIENT
Start: 2023-02-20 | End: 2023-02-28 | Stop reason: HOSPADM

## 2023-02-20 RX ORDER — BUDESONIDE 0.5 MG/2ML
0.5 INHALANT ORAL EVERY 12 HOURS
Status: DISCONTINUED | OUTPATIENT
Start: 2023-02-20 | End: 2023-02-28 | Stop reason: HOSPADM

## 2023-02-20 RX ORDER — DILTIAZEM HYDROCHLORIDE 120 MG/1
120 CAPSULE, COATED, EXTENDED RELEASE ORAL 2 TIMES DAILY
COMMUNITY
Start: 2023-01-30

## 2023-02-20 RX ORDER — LEVOFLOXACIN 5 MG/ML
750 INJECTION, SOLUTION INTRAVENOUS
Status: COMPLETED | OUTPATIENT
Start: 2023-02-20 | End: 2023-02-20

## 2023-02-20 RX ORDER — OXYCODONE AND ACETAMINOPHEN 7.5; 325 MG/1; MG/1
1 TABLET ORAL 4 TIMES DAILY PRN
COMMUNITY
Start: 2023-02-18

## 2023-02-20 RX ORDER — OXYCODONE AND ACETAMINOPHEN 7.5; 325 MG/1; MG/1
1 TABLET ORAL 4 TIMES DAILY PRN
Status: DISCONTINUED | OUTPATIENT
Start: 2023-02-20 | End: 2023-02-28 | Stop reason: HOSPADM

## 2023-02-20 RX ORDER — FLUTICASONE PROPIONATE AND SALMETEROL 500; 50 UG/1; UG/1
1 POWDER RESPIRATORY (INHALATION) 2 TIMES DAILY
COMMUNITY
Start: 2023-01-30

## 2023-02-20 RX ADMIN — BUSPIRONE HYDROCHLORIDE 7.5 MG: 5 TABLET ORAL at 09:02

## 2023-02-20 RX ADMIN — DILTIAZEM HYDROCHLORIDE 120 MG: 120 CAPSULE, COATED, EXTENDED RELEASE ORAL at 09:02

## 2023-02-20 RX ADMIN — TIZANIDINE 4 MG: 4 TABLET ORAL at 11:02

## 2023-02-20 RX ADMIN — DOXYCYCLINE HYCLATE 100 MG: 100 TABLET, COATED ORAL at 09:02

## 2023-02-20 RX ADMIN — ENOXAPARIN SODIUM 40 MG: 40 INJECTION SUBCUTANEOUS at 05:02

## 2023-02-20 RX ADMIN — OXYCODONE AND ACETAMINOPHEN 1 TABLET: 7.5; 325 TABLET ORAL at 05:02

## 2023-02-20 RX ADMIN — ARFORMOTEROL TARTRATE 15 MCG: 15 SOLUTION RESPIRATORY (INHALATION) at 07:02

## 2023-02-20 RX ADMIN — IOHEXOL 100 ML: 350 INJECTION, SOLUTION INTRAVENOUS at 01:02

## 2023-02-20 RX ADMIN — MEROPENEM AND SODIUM CHLORIDE 500 MG: 500 INJECTION, SOLUTION INTRAVENOUS at 05:02

## 2023-02-20 RX ADMIN — LEVOFLOXACIN 750 MG: 750 INJECTION, SOLUTION INTRAVENOUS at 01:02

## 2023-02-20 RX ADMIN — SODIUM CHLORIDE 1000 ML: 9 INJECTION, SOLUTION INTRAVENOUS at 11:02

## 2023-02-20 RX ADMIN — MAGNESIUM SULFATE HEPTAHYDRATE 2 G: 40 INJECTION, SOLUTION INTRAVENOUS at 11:02

## 2023-02-20 RX ADMIN — BUDESONIDE 0.5 MG: 0.5 INHALANT ORAL at 07:02

## 2023-02-20 RX ADMIN — LEVALBUTEROL HYDROCHLORIDE 0.63 MG: 0.63 SOLUTION RESPIRATORY (INHALATION) at 10:02

## 2023-02-20 RX ADMIN — DIPHENHYDRAMINE HYDROCHLORIDE 25 MG: 50 INJECTION, SOLUTION INTRAMUSCULAR; INTRAVENOUS at 01:02

## 2023-02-20 RX ADMIN — GABAPENTIN 400 MG: 400 CAPSULE ORAL at 09:02

## 2023-02-20 NOTE — HPI
The patient is a 62 yo female with past medical history of anemia, arthritis, COPD, chronic respiratory failure on home oxygen, pneumonia and chronic back pain who presented to the ED with increased oxygen requirements. She is usually on 3L nasal cannula. She was discharged from the hospital last week on Levaquin for pneumonia. Home health went to see patient today and found her oxygen saturation to be 82% on 3L.  Her flow was increased to 5L with slow improvement to low 90s. She was instructed to come to the ED for further evaluation. She reports she was tired and weak over the weekend. Workup in the ED with increasing density in right lung on CT chest. She reports cough is becoming productive. The sputum is yellow greenish color. Previously cough was not productive.Hospital medicine consulted for admission. Discussed CT findings with patient.

## 2023-02-20 NOTE — ED NOTES
Pt still somnolent, but arouses to verbal stimuli. Pt is oriented x 4. Respirations e/u, normal respiratory effort noted. Call light within reach. Will continue to monitor.

## 2023-02-20 NOTE — ED PROVIDER NOTES
SCRIBE #1 NOTE: I, Kailey García, am scribing for, and in the presence of, Alexa Aldana DO. I have scribed the entire note.      History      Chief Complaint   Patient presents with    Shortness of Breath     Recent pneumonia and UTI       Review of patient's allergies indicates:   Allergen Reactions    Ciprofloxacin Itching and Rash    Nitrofurantoin monohyd/m-cryst Hives    Penicillins Hives     Patient broke out in hives. Patient also said she had hives in her throat        HPI   HPI    2/20/2023, 10:21 AM   History obtained from the patient and sig other       History of Present Illness: Christine Horner is a 63 y.o. female patient with a PMHx of anemia, CLL, and COPD who presents to the Emergency Department for SOB upon exertion which onset gradually. Pt reports that she was admitted at this facility last week for the same complaint. Pt reports that she received IV abx (merrum for h/o of pseudomonas pna, negative blood cultures) during her admission for PNA and a UTI and was discharged with 2 abx (levaquin) and zofran. Pt is unable to specify which abx she was discharged with. Today, the pt was advised to come back to the ER by home health because her O2 saturation was 80% on 4L and only got into the 90s when her O2 was increased to 5L. Pt is on 3L of O2 at baseline. Symptoms are intermittent and moderate in severity. Associated sxs include palpitations, a productive cough with greenish-yellow sputum, nausea, and light-headedness. Pt also c/o dysuria. Patient denies any new back pain, fever, CP, V/D, appetite change, and all other sxs at this time. Pt is a former smoker of 10 years. Pt also has CLL and states that she is followed by Dr. Yanez (Hematology and Oncology), but reports that she has never received any treatment for CLL. No further complaints or concerns at this time.  Patient is requesting a dose of her tizanidine for her chronic back pain.    Cardiologist Dr. Sara Guy  Pulmonologist  Dr. Prieto    Arrival mode: EMS    PCP: Eloy Hdez MD       Past Medical History:  Past Medical History:   Diagnosis Date    Anemia 2023    Arthritis     Chronic back pain     COPD (chronic obstructive pulmonary disease)     COPD with acute exacerbation 2016    Pneumonia     SOB (shortness of breath)        Past Surgical History:  Past Surgical History:   Procedure Laterality Date    BACK SURGERY       SECTION      TONSILLECTOMY      WISDOM TOOTH EXTRACTION           Family History:  Family History   Problem Relation Age of Onset    Cancer Father     Diabetes Maternal Grandmother        Social History:  Social History     Tobacco Use    Smoking status: Former     Packs/day: 1.00     Years: 30.00     Pack years: 30.00     Types: Cigarettes     Quit date: 1/3/2012     Years since quittin.1    Smokeless tobacco: Not on file   Substance and Sexual Activity    Alcohol use: No    Drug use: No    Sexual activity: Never     Birth control/protection: None       ROS   Review of Systems   Constitutional:  Negative for appetite change and fever.   Respiratory:  Positive for cough (productive with greenish-yellow sputum) and shortness of breath (upon exertion).    Cardiovascular:  Positive for palpitations. Negative for chest pain.   Gastrointestinal:  Positive for nausea. Negative for diarrhea and vomiting.   Genitourinary:  Positive for dysuria.   Musculoskeletal:  Negative for back pain (new).   Neurological:  Positive for light-headedness.     Physical Exam      Initial Vitals [23 1003]   BP Pulse Resp Temp SpO2   134/86 110 20 98.1 °F (36.7 °C) 96 %      MAP       --          Physical Exam  Nursing Notes and Vital Signs Reviewed.  Constitutional: Patient is in no acute distress. Well-developed and well-nourished.  Head: Atraumatic. Normocephalic.  Eyes: PERRL. EOM intact. Conjunctivae are not pale. No scleral icterus.  ENT: Mucous membranes are moist.   Neck: Supple. Full ROM. No  lymphadenopathy.  Cardiovascular: Tachycardic rate. Regular rhythm.   Pulmonary/Chest: No respiratory distress. Lung sounds are diminished.  Abdominal: Soft and non-distended.  There is no tenderness.  No rebound, guarding, or rigidity.   Musculoskeletal: Moves all extremities. No obvious deformities. No edema.  Skin: Warm and dry.  Neurological:  Alert, awake, and appropriate.  Normal speech.  No acute focal neurological deficits are appreciated.  Psychiatric: Normal affect. Good eye contact. Appropriate in content.    ED Course    Critical Care    Date/Time: 2/20/2023 11:46 AM  Performed by: Alexa Aldana DO  Authorized by: Alexa Aldana DO   Direct patient critical care time: 11 minutes  Additional history critical care time: 7 minutes  Ordering / reviewing critical care time: 6 minutes  Documentation critical care time: 7 minutes  Consulting other physicians critical care time: 6 minutes  Total critical care time (exclusive of procedural time) : 37 minutes  Critical care time was exclusive of separately billable procedures and treating other patients and teaching time.  Critical care was necessary to treat or prevent imminent or life-threatening deterioration of the following conditions: respiratory failure (hypoxia).  Critical care was time spent personally by me on the following activities: blood draw for specimens, development of treatment plan with patient or surrogate, discussions with consultants, interpretation of cardiac output measurements, evaluation of patient's response to treatment, examination of patient, obtaining history from patient or surrogate, ordering and review of laboratory studies, ordering and performing treatments and interventions, ordering and review of radiographic studies, pulse oximetry, re-evaluation of patient's condition and review of old charts.      ED Vital Signs:  Vitals:    02/20/23 1003 02/20/23 1015 02/20/23 1021 02/20/23 1041   BP: 134/86 133/72     Pulse: 110 (!)  116  110   Resp: 20 19 18   Temp: 98.1 °F (36.7 °C)      TempSrc: Oral      SpO2: 96% 97%  95%   Weight:   60 kg (132 lb 4.4 oz)     02/20/23 1100 02/20/23 1130 02/20/23 1245 02/20/23 1400   BP: 132/84  128/78 104/66   Pulse: (!) 115 108 106 98   Resp: 18 15 15 15   Temp:       TempSrc:       SpO2: (!) 89% (!) 93% (!) 93% 95%   Weight:        02/20/23 1700 02/20/23 1745 02/20/23 1756 02/20/23 1910   BP: 110/65 108/68     Pulse: 106 105  106   Resp: 20 15 20    Temp:       TempSrc:       SpO2: 95% (!) 94%     Weight:        02/20/23 1915   BP:    Pulse: 107   Resp: 18   Temp:    TempSrc:    SpO2: 96%   Weight:        Abnormal Lab Results:  Labs Reviewed   CBC W/ AUTO DIFFERENTIAL - Abnormal; Notable for the following components:       Result Value    WBC 30.84 (*)     RBC 3.70 (*)     Hemoglobin 10.0 (*)     Hematocrit 32.8 (*)     MCHC 30.5 (*)     RDW 15.1 (*)     MPV 9.1 (*)     Gran % 75.0 (*)     Lymph % 16.0 (*)     All other components within normal limits    Narrative:     Release to patient->Immediate   COMPREHENSIVE METABOLIC PANEL - Abnormal; Notable for the following components:    Sodium 135 (*)     Chloride 87 (*)     CO2 34 (*)     BUN 7 (*)     Calcium 7.7 (*)     Albumin 3.0 (*)     Alkaline Phosphatase 149 (*)     AST 61 (*)     All other components within normal limits    Narrative:     Release to patient->Immediate   D DIMER, QUANTITATIVE - Abnormal; Notable for the following components:    D-Dimer 3.22 (*)     All other components within normal limits    Narrative:     Release to patient->Immediate   MAGNESIUM - Abnormal; Notable for the following components:    Magnesium 1.4 (*)     All other components within normal limits    Narrative:     Release to patient->Immediate   URINALYSIS, REFLEX TO URINE CULTURE - Abnormal; Notable for the following components:    Leukocytes, UA 3+ (*)     All other components within normal limits    Narrative:     Specimen Source->Urine   DRUG SCREEN PANEL, URINE  EMERGENCY - Abnormal; Notable for the following components:    Opiate Scrn, Ur Presumptive Positive (*)     All other components within normal limits    Narrative:     Specimen Source->Urine   ISTAT PROCEDURE - Abnormal; Notable for the following components:    POC PCO2 73.4 (*)     POC PO2 122 (*)     POC HCO3 43.5 (*)     All other components within normal limits   ISTAT PROCEDURE - Abnormal; Notable for the following components:    POC PCO2 60.1 (*)     POC PO2 72 (*)     POC HCO3 39.5 (*)     POC SATURATED O2 94 (*)     All other components within normal limits   CULTURE, BLOOD   CULTURE, BLOOD   CULTURE, RESPIRATORY   HIV 1 / 2 ANTIBODY    Narrative:     Release to patient->Immediate   HEPATITIS C ANTIBODY    Narrative:     Release to patient->Immediate   HEP C VIRUS HOLD SPECIMEN    Narrative:     Release to patient->Immediate   LACTIC ACID, PLASMA    Narrative:     Release to patient->Immediate   TROPONIN I    Narrative:     Release to patient->Immediate   TSH    Narrative:     Release to patient->Immediate   URINALYSIS MICROSCOPIC    Narrative:     Specimen Source->Urine   AMMONIA   SARS-COV-2 RDRP GENE    Narrative:     This test utilizes isothermal nucleic acid amplification technology to detect the SARS-CoV-2 RdRp nucleic acid segment. The analytical sensitivity (limit of detection) is 500 copies/swab.     A POSITIVE result is indicative of the presence of SARS-CoV-2 RNA; clinical correlation with patient history and other diagnostic information is necessary to determine patient infection status.    A NEGATIVE result means that SARS-CoV-2 nucleic acids are not present above the limit of detection. A NEGATIVE result should be treated as presumptive. It does not rule out the possibility of COVID-19 and should not be the sole basis for treatment decisions. If COVID-19 is strongly suspected based on clinical and exposure history, re-testing using an alternate molecular assay should be considered.     This  test is only for use under the Food and Drug Administration s Emergency Use Authorization (EUA).     Commercial kits are provided by [employer]. Performance characteristics of the EUA have been independently verified by [facility] Department of Pathology and Laboratory Medicine.   _________________________________________________________________   The authorized Fact Sheet for Healthcare Providers and the authorized Fact Sheet for Patients of the ID NOW COVID-19 are available on the FDA website:    https://www.fda.gov/media/480958/download      https://www.fda.gov/media/438422/download           All Lab Results:  Results for orders placed or performed during the hospital encounter of 02/20/23   HIV 1/2 Ag/Ab (4th Gen)   Result Value Ref Range    HIV 1/2 Ag/Ab Negative Negative   Hepatitis C Antibody   Result Value Ref Range    Hepatitis C Ab Negative Negative   HCV Virus Hold Specimen   Result Value Ref Range    HEP C Virus Hold Specimen Hold for HCV sendout    CBC auto differential   Result Value Ref Range    WBC 30.84 (H) 3.90 - 12.70 K/uL    RBC 3.70 (L) 4.00 - 5.40 M/uL    Hemoglobin 10.0 (L) 12.0 - 16.0 g/dL    Hematocrit 32.8 (L) 37.0 - 48.5 %    MCV 89 82 - 98 fL    MCH 27.0 27.0 - 31.0 pg    MCHC 30.5 (L) 32.0 - 36.0 g/dL    RDW 15.1 (H) 11.5 - 14.5 %    Platelets 383 150 - 450 K/uL    MPV 9.1 (L) 9.2 - 12.9 fL    Immature Granulocytes CANCELED 0.0 - 0.5 %    Immature Grans (Abs) CANCELED 0.00 - 0.04 K/uL    nRBC 0 0 /100 WBC    Gran % 75.0 (H) 38.0 - 73.0 %    Lymph % 16.0 (L) 18.0 - 48.0 %    Mono % 4.0 4.0 - 15.0 %    Eosinophil % 4.0 0.0 - 8.0 %    Basophil % 0.0 0.0 - 1.9 %    Bands 1.0 %    Platelet Estimate Appears normal     Aniso Slight     Poik Slight     Ovalocytes Occasional     Target Cells Occasional     Stomatocytes Present     Smudge Cells Present     Differential Method Manual    Comprehensive metabolic panel   Result Value Ref Range    Sodium 135 (L) 136 - 145 mmol/L     Potassium 4.5 3.5 - 5.1 mmol/L    Chloride 87 (L) 95 - 110 mmol/L    CO2 34 (H) 23 - 29 mmol/L    Glucose 75 70 - 110 mg/dL    BUN 7 (L) 8 - 23 mg/dL    Creatinine 0.7 0.5 - 1.4 mg/dL    Calcium 7.7 (L) 8.7 - 10.5 mg/dL    Total Protein 6.6 6.0 - 8.4 g/dL    Albumin 3.0 (L) 3.5 - 5.2 g/dL    Total Bilirubin 0.5 0.1 - 1.0 mg/dL    Alkaline Phosphatase 149 (H) 55 - 135 U/L    AST 61 (H) 10 - 40 U/L    ALT 18 10 - 44 U/L    Anion Gap 14 8 - 16 mmol/L    eGFR >60 >60 mL/min/1.73 m^2   D dimer, quantitative   Result Value Ref Range    D-Dimer 3.22 (H) <0.50 mg/L FEU   Lactic acid, plasma   Result Value Ref Range    Lactate (Lactic Acid) 1.5 0.5 - 2.2 mmol/L   Magnesium   Result Value Ref Range    Magnesium 1.4 (L) 1.6 - 2.6 mg/dL   Troponin I   Result Value Ref Range    Troponin I <0.006 0.000 - 0.026 ng/mL   TSH   Result Value Ref Range    TSH 3.676 0.400 - 4.000 uIU/mL   Urinalysis, Reflex to Urine Culture Urine, Clean Catch    Specimen: Urine   Result Value Ref Range    Specimen UA Urine, Clean Catch     Color, UA Yellow Yellow, Straw, Jeannine    Appearance, UA Clear Clear    pH, UA 7.0 5.0 - 8.0    Specific Gravity, UA 1.010 1.005 - 1.030    Protein, UA Negative Negative    Glucose, UA Negative Negative    Ketones, UA Negative Negative    Bilirubin (UA) Negative Negative    Occult Blood UA Negative Negative    Nitrite, UA Negative Negative    Urobilinogen, UA Negative <2.0 EU/dL    Leukocytes, UA 3+ (A) Negative   Urinalysis Microscopic   Result Value Ref Range    WBC, UA 3 0 - 5 /hpf    Bacteria Rare None-Occ /hpf    Squam Epithel, UA 1 /hpf    Microscopic Comment SEE COMMENT    Ammonia   Result Value Ref Range    Ammonia 25 10 - 50 umol/L   Drug screen panel, emergency   Result Value Ref Range    Benzodiazepines Negative Negative    Methadone metabolites Negative Negative    Cocaine (Metab.) Negative Negative    Opiate Scrn, Ur Presumptive Positive (A) Negative    Barbiturate Screen, Ur Negative Negative     Amphetamine Screen, Ur Negative Negative    THC Negative Negative    Phencyclidine Negative Negative    Creatinine, Urine 26.8 15.0 - 325.0 mg/dL    Toxicology Information SEE COMMENT    POCT COVID-19 Rapid Screening   Result Value Ref Range    POC Rapid COVID Negative Negative     Acceptable Yes    ISTAT PROCEDURE   Result Value Ref Range    POC PH 7.381 7.35 - 7.45    POC PCO2 73.4 (HH) 35 - 45 mmHg    POC PO2 122 (H) 80 - 100 mmHg    POC HCO3 43.5 (H) 24 - 28 mmol/L    POC BE 18 -2 to 2 mmol/L    POC SATURATED O2 98 95 - 100 %    Sample ARTERIAL     Site RR     Allens Test Pass     DelSys Nasal Can     Mode SPONT     Flow 4    ISTAT PROCEDURE   Result Value Ref Range    POC PH 7.425 7.35 - 7.45    POC PCO2 60.1 (HH) 35 - 45 mmHg    POC PO2 72 (L) 80 - 100 mmHg    POC HCO3 39.5 (H) 24 - 28 mmol/L    POC BE 15 -2 to 2 mmol/L    POC SATURATED O2 94 (L) 95 - 100 %    Sample ARTERIAL     Site LR     Allens Test Pass     DelSys Nasal Can     Mode SPONT     Flow 3          Imaging Results:  Imaging Results              CTA Chest Non-Coronary (PE Studies) (Final result)  Result time 02/20/23 16:28:17      Final result by ELIECER Renteria Sr., MD (02/20/23 16:28:17)                   Impression:      1. This is a limited examination secondary to patient motion. There is no large or central pulmonary embolus. A small or peripheral pulmonary embolus cannot be excluded secondary to the motion artifact.  2. There has been interval development of areas of increased density scattered throughout the right lung. One of the larger areas is located in the right upper lobe. This area measures 3.0 cm in craniocaudal dimension by 3.4 cm in AP dimension by 4.8 cm in medial-lateral dimension.  I recommend radiographic follow-up until complete resolution.  3. There is a small right pleural effusion.  4. There are vertebroplasty changes in the midportion of the thoracic spine. There is increase in the normal thoracic  kyphosis.  All CT scans at this facility use dose modulation, iterative reconstruction, and/or weight base dosing when appropriate to reduce radiation dose when appropriate to reduce radiation dose to as low as reasonably achievable.      Electronically signed by: Sunny Renteria MD  Date:    02/20/2023  Time:    16:28               Narrative:    EXAMINATION:  CTA CHEST NON CORONARY (PE STUDIES)    CLINICAL HISTORY:  Pulmonary embolism (PE) suspected, positive D-dimer; pneumonia    TECHNIQUE:  Standard chest CT PE protocol was performed with IV contrast and 3D MIP reformats.  100 mL of Omnipaque 350 contrast material was used for this examination.    COMPARISON:  Comparison was made to CTA of the chest performed on 04/19/2016.    FINDINGS:  This is a limited examination secondary to patient motion.  There is no large or central pulmonary embolus.  A small or peripheral pulmonary embolus cannot be excluded secondary to the motion artifact.  The size of heart is within normal limits.  There has been interval development of areas of increased density scattered throughout the right lung.  One of the larger areas is located in the right upper lobe.  This area measures 3.0 cm in craniocaudal dimension by 3.4 cm in AP dimension by 4.8 cm in medial-lateral dimension.  There is a small right pleural effusion.  There are moderate dependent atelectatic changes in the left lung.  There is no pneumothorax.  There are old fractures on both sides of the thoracic cage.  There are vertebroplasty changes in the midportion of the thoracic spine.  There is increase in the normal thoracic kyphosis.                                       CT Head Without Contrast (Final result)  Result time 02/20/23 16:10:31      Final result by Cordell Gill MD (02/20/23 16:10:31)                   Impression:      No acute intracranial CT abnormality.    Bilateral mastoid effusions right greater than left.  Correlation advised.    All CT scans at  this facility are performed  using dose modulation techniques as appropriate to performed exam including the following:  automated exposure control; adjustment of mA and/or kV according to the patients size (this includes techniques or standardized protocols for targeted exams where dose is matched to indication/reason for exam: i.e. extremities or head);  iterative reconstruction technique.      Electronically signed by: Cordell Gill  Date:    02/20/2023  Time:    16:10               Narrative:    EXAMINATION:  CT HEAD WITHOUT CONTRAST    CLINICAL HISTORY:  Mental status change, unknown cause;    TECHNIQUE:  Low dose axial CT images obtained throughout the head without intravenous contrast. Sagittal and coronal reconstructions were performed.    COMPARISON:  05/03/2021    FINDINGS:  Intracranial compartment:    Ventricles and sulci are normal in size for age without evidence of hydrocephalus. No extra-axial blood or fluid collections.    The brain parenchyma appears normal. No parenchymal mass, hemorrhage, edema or major vascular distribution infarct.    Skull/extracranial contents (limited evaluation): No fracture. Paranasal sinuses are clear.  Left mastoid air cells show partial opacification.  Near complete opacification the right mastoid air cells.                                       X-Ray Chest AP Portable (Final result)  Result time 02/20/23 11:12:35      Final result by Sunny Morton MD (02/20/23 11:12:35)                   Impression:      Stable appearance of the chest when compared to 02/13/2023, as above.  Recommend continued follow-up as warranted.      Electronically signed by: Sunny Morton  Date:    02/20/2023  Time:    11:12               Narrative:    EXAMINATION:  XR CHEST AP PORTABLE    CLINICAL HISTORY:  sob;    TECHNIQUE:  Single frontal view of the chest was performed.    COMPARISON:  02/13/2023 chest radiograph.    FINDINGS:  Similar appearance of the chest with patchy nodular  opacities in the right mid lung.  Remaining lungs appear otherwise clear.    The cardiac silhouette is normal in size. The hilar and mediastinal contours are unchanged.    Stable bilateral rib deformities.                                     The EKG was ordered, reviewed, and independently interpreted by the ED provider.  Interpretation time: 10:35  Rate: 107 BPM  Rhythm: sinus tachycardia  Interpretation: Left axis deviation. No STEMI.             The Emergency Provider reviewed the vital signs and test results, which are outlined above.    ED Discussion     4:54 PM: Discussed case with Dr. García ([facility]). Dr. García agrees with current care and management of pt and accepts admission.   Admitting Service: [facility]   Admitting Physician: Dr. García  Admit to: Inpatient    4:55 PM: Re-evaluated pt. I have discussed test results, shared treatment plan, and the need for admission with patient and family at bedside. Pt and family express understanding at this time and agree with all information. All questions answered. Pt and family have no further questions or concerns at this time. Pt is ready for admit.      ED Course as of 02/20/23 2159   Mon Feb 20, 2023   1050 ABG shows a compensated respiratory acidosis with an PaO2 122.  Nasal cannula was titrated down to 3 L. She appears to be a chronic CO2 retainer as her bicarb is increased to normalize her PH. [NF]   1121 WBC(!): 30.84  History of CLL [NF]   1121 Hemoglobin(!): 10.0  Chronic and at baseline. [NF]   1121 ISTAT PROCEDURE(!!)  Compensated respiratory acidosis [NF]   1121 D-Dimer(!): 3.22  Elevated D-dimer order CTA to evaluate for pulmonary embolism. [NF]   1122 X-Ray Chest AP Portable  Persistent right middle lobe pneumonia [NF]   1122 EKG reviewed and shows sinus tachycardia with left axis deviation NV interval 130 no ST or T-wave changes. NO STEMI. [NF]   1140 Comprehensive metabolic panel(!)  Renal function normal. [NF]   1141  Troponin I: <0.006  No ACS. [NF]   1141 Lactate, Jeff: 1.5  No signs of septic shock [NF]   1141 Magnesium(!): 1.4  Hypomagnesemia, will infuse 2 g IV for replacement which will also help with smooth muscle relaxation. [NF]   1141 SARS-CoV-2 RNA, Amplification, Qual: Negative  Negative for COVID-19 [NF]   1141 Blood cultures pending. [NF]   1206 TSH: 3.676  No signs of hyperthyroidism [NF]   1400 I was notified by nurse that patient was more confused and difficult to arouse.  She did receive tizanidine but this is not a new medication for her.  I suspect CO2 narcosis given her recent increase in nasal cannula oxygen at home with an elevated CO2 on previous ABG.  Upon repeat evaluation patient is sleeping but arousable with verbal stimulus.  She is oriented to person place and time but is slow to respond and falls back asleep without repeated stimulus.  This is consistent with CO2 narcosis.  Respiratory notified need for repeat ABG and BiPAP.  CT head ordered as well. [NF]   1433 CO2 is actually improved therefore CO2 narcosis unlikely.  BiPAP was continued.  CT head pending.  Possibly secondary to tizanidine.  Patient was also given a dose of IV Benadryl due to her reported hives from ciprofloxacin she received a dose of IV Levaquin.  This could have also added to her change in mental status. [NF]   1523 Patient's right forearm IV infiltrated and some Levaquin was infused into the subcutaneous area.  Upon re-evaluation patient has no fluctuance.  Coban placed and compresses recommended. [NF]   1640 CTA shows no signs of central PE but worsening infiltrate of the right lung with recent admission for pneumonia, received IV antibiotics with history of Pseudomonas. [NF]   1640 CT head shows no acute intracranial hemorrhage or ischemic change to explain change in mental status. [NF]   1641 63-year-old female with a history of COPD and CLL presents with worsening dyspnea on exertion.  She was admitted a week ago for  pneumonia and UTI.  Initially treated with Merrem due to history of Pseudomonas.  Blood cultures negative.  Transitioned to Levaquin.  Today patient presents with hypoxia on her normal home oxygen level of 3 L. EKG and troponin do not indicate ACS.  Normal lactic acid with no fever.  Persistent leukocytosis.  Normal TSH.  UTI has appeared to clear.  Normal LFTs and ammonia.  D-dimer elevated.  CTA shows no central PE with worsening right lobe findings.  ABG shows compensated respiratory acidosis.  Patient had an episode of altered mental status after receiving Benadryl and tizanidine.  Repeat ABG showed improved CO2.  Head CT showed no signs of acute ischemic changes or intracranial hemorrhage.   [NF]      ED Course User Index  [NF] Alexa Aldana DO       ED Medication(s):  Medications   sodium chloride 0.9% flush 10 mL (has no administration in time range)   enoxaparin injection 40 mg (40 mg Subcutaneous Given 2/20/23 1746)   meropenem-0.9% sodium chloride 500 mg/50 mL IVPB (0 mg Intravenous Stopped 2/20/23 1901)   doxycycline tablet 100 mg (100 mg Oral Given 2/20/23 2105)   busPIRone split tablet 7.5 mg (7.5 mg Oral Given 2/20/23 2105)   diltiaZEM 24 hr capsule 120 mg (120 mg Oral Given 2/20/23 2104)   gabapentin capsule 400 mg (400 mg Oral Given 2/20/23 2105)   levothyroxine tablet 125 mcg (has no administration in time range)   montelukast tablet 10 mg (has no administration in time range)   oxyCODONE-acetaminophen 7.5-325 mg per tablet 1 tablet (1 tablet Oral Given 2/20/23 1756)   levalbuterol nebulizer solution 0.63 mg (has no administration in time range)   ipratropium 0.02 % nebulizer solution 0.5 mg (has no administration in time range)   budesonide nebulizer solution 0.5 mg (0.5 mg Nebulization Given 2/20/23 1915)   arformoteroL nebulizer solution 15 mcg (15 mcg Nebulization Given 2/20/23 1915)   sodium chloride 0.9% bolus 1,000 mL 1,000 mL (0 mLs Intravenous Stopped 2/20/23 1203)   levalbuterol  nebulizer solution 0.63 mg (0.63 mg Nebulization Given 2/20/23 1041)   tiZANidine tablet 4 mg (4 mg Oral Given 2/20/23 1155)   magnesium sulfate 2g in water 50mL IVPB (premix) (0 g Intravenous Stopped 2/20/23 1434)   levoFLOXacin 750 mg/150 mL IVPB 750 mg (0 mg Intravenous Stopped 2/20/23 1540)   diphenhydrAMINE injection 25 mg (25 mg Intravenous Given 2/20/23 1311)   iohexoL (OMNIPAQUE 350) injection 100 mL (100 mLs Intravenous Given 2/20/23 1332)           New Prescriptions    No medications on file         Medical Decision Making    Medical Decision Making:   History:   I obtained history from: someone other than patient.       <> Summary of History:   Old Records Summarized: records from previous admission(s).       <> Summary of Records: Review of external records: I reviewed the pt's discharge summary from her admission from 2/13/23 to 2/16/23. Pt was admitted for a COPD exacerbation and received IV merrem as she has a history of pseudomonas pneumonia. During her admission, the pt's blood cultures were negative. Pt was discharged with Levaquin, home health, PT, OT, and speech therapy. Pt wears 3L of O2 at home at baseline. Pt's Pulmonologist is Dr. Prieto.  Initial Assessment:   63-year-old female with a history of COPD with recent admission for pneumonia and worsening shortness breath with hypoxia on baseline home oxygen.  Differential Diagnosis:   Pneumonia PE, ACS, CO2 narcosis, intracranial hemorrhage or ischemic stroke, urosepsis.  Clinical Tests:   Lab Tests: Ordered and Reviewed  Radiological Study: Ordered and Reviewed  Medical Tests: Ordered and Reviewed  ED Management:  63-year-old female with a history of COPD and CLL presents with worsening dyspnea on exertion.  She was admitted a week ago for pneumonia and UTI.  Initially treated with Merrem due to history of Pseudomonas.  Blood cultures negative.  Transitioned to Levaquin.  Today patient presents with hypoxia on her normal home oxygen  level of 3 L. EKG and troponin do not indicate ACS.  Normal lactic acid with no fever.  Persistent leukocytosis.  Normal TSH.  UTI has appeared to clear.  Normal LFTs and ammonia.  D-dimer elevated.  CTA shows no central PE with worsening right lobe findings.  ABG shows compensated respiratory acidosis.  Patient had an episode of altered mental status after receiving Benadryl (due to reported allergy to cipro) and tizanidine (per request for chronic back, she takes at home).  Repeat ABG showed improved CO2.  Head CT showed no signs of acute ischemic changes or intracranial hemorrhage.      Other:   I have discussed this case with another health care provider.       <> Summary of the Discussion: Discussed case with Dr. García ([facility]). Dr. García agrees with current care and management of pt and accepts admission.   Admitting Service: Hospital Medicine   Admitting Physician: Dr. García  Admit to: Inpatient           Scribe Attestation:   Scribe #1: I performed the above scribed service and the documentation accurately describes the services I performed. I attest to the accuracy of the note.    Attending:   Physician Attestation Statement for Scribe #1: I, Alexa Aldana DO, personally performed the services described in this documentation, as scribed by Kailey García, in my presence, and it is both accurate and complete.          Clinical Impression       ICD-10-CM ICD-9-CM   1. CLL (chronic lymphocytic leukemia)  C91.10 204.10   2. SOB (shortness of breath)  R06.02 786.05   3. Chronic obstructive pulmonary disease, unspecified COPD type  J44.9 496   4. Pneumonia of right middle lobe due to infectious organism  J18.9 486   5. Tachycardia  R00.0 785.0   6. Hypomagnesemia  E83.42 275.2   7. Acute on chronic respiratory failure with hypoxemia  J96.21 518.84       Disposition:   Disposition: Admitted  Condition: Margy Aldana DO  02/20/23 2200

## 2023-02-20 NOTE — PHARMACY MED REC
"Admission Medication History     The home medication history was taken by Arun Correia.    You may go to "Admission" then "Reconcile Home Medications" tabs to review and/or act upon these items.     The home medication list has been updated by the Pharmacy department.   Please read ALL comments highlighted in yellow.   Please address this information as you see fit.    Feel free to contact us if you have any questions or require assistance.      Medications listed below were obtained from: Patient/family and Analytic software- [employer]  (Not in a hospital admission)      Arun Correia  [id number]    Current Outpatient Medications on File Prior to Encounter   Medication Sig Dispense Refill Last Dose    albuterol (PROAIR HFA) 90 mcg/actuation inhaler INHALE TWO PUFFS EVERY 4 TO 6 HOURS **THANK YOU** 8.5 g 11 2/20/2023    busPIRone (BUSPAR) 7.5 MG tablet Take 7.5 mg by mouth 2 (two) times a day.   2/20/2023    calcitRIOL (ROCALTROL) 0.25 MCG Cap Take 0.25 mcg by mouth once daily.   2/20/2023    diltiaZEM (CARDIZEM CD) 120 MG Cp24 Take 120 mg by mouth 2 (two) times daily.   2/20/2023    fluticasone (FLONASE) 50 mcg/actuation nasal spray 2 sprays by Each Nare route 2 (two) times daily. 16 g 11 Past Week    fluticasone-salmeterol diskus inhaler 500-50 mcg Inhale 1 puff into the lungs 2 (two) times daily.   2/20/2023    gabapentin (NEURONTIN) 400 MG capsule Take by mouth 4 (four) times daily.    2/20/2023    levothyroxine (SYNTHROID) 125 MCG tablet Take 125 mcg by mouth before breakfast.   2/20/2023    montelukast (SINGULAIR) 10 mg tablet Take 10 mg by mouth once daily.   2/20/2023    oxyCODONE-acetaminophen (PERCOCET) 7.5-325 mg per tablet Take 1 tablet by mouth 4 (four) times daily as needed.   2/20/2023    tiotropium (SPIRIVA) 18 mcg inhalation capsule Inhale 18 mcg into the lungs once daily. Controller   2/20/2023    tiZANidine (ZANAFLEX) 4 MG tablet Take 1 tablet (4 mg total) by mouth every 8 (eight) hours. " for 3 days 9 tablet 0 2/20/2023    albuterol (PROVENTIL) 2.5 mg /3 mL (0.083 %) nebulizer solution INHALE 1 VIAL VIA NEBULIZER EVERY 4 HOURS AS NEEDED **THANK YOU** 360 mL 11     calcium carbonate-magnesium hydroxide (ROLAIDS) 550-110 mg Chew Take 2,000 mg by mouth 3 (three) times daily as needed.       ondansetron (ZOFRAN-ODT) 4 MG TbDL Take 1 tablet (4 mg total) by mouth every 12 (twelve) hours as needed (nausea or vomiting). 3 tablet 0                          .

## 2023-02-20 NOTE — ED NOTES
Pt is now awake, alert, and oriented x 4, NSR on monitor, respirations e/u, skin warm and dry, VSS, NAD. Will continue to monitor.

## 2023-02-20 NOTE — ASSESSMENT & PLAN NOTE
Patient with Hypercapnic and Hypoxic Respiratory failure which is Acute on chronic.  she is on home oxygen at 3 LPM. Supplemental oxygen was provided and noted- Oxygen Concentration (%):  [36] 36.   Signs/symptoms of respiratory failure include- tachypnea, increased work of breathing and lethargy. Contributing diagnoses includes - COPD, Pleural effusion and Pneumonia Labs and images were reviewed. Patient Has recent ABG, which has been reviewed. Will treat underlying causes and adjust management of respiratory failure as follows- IV antibiotics, supplemental oxygen, prn breathing treatments

## 2023-02-20 NOTE — SUBJECTIVE & OBJECTIVE
Past Medical History:   Diagnosis Date    Anemia 2023    Arthritis     Chronic back pain     COPD (chronic obstructive pulmonary disease)     COPD with acute exacerbation 2016    Pneumonia     SOB (shortness of breath)        Past Surgical History:   Procedure Laterality Date    BACK SURGERY       SECTION      TONSILLECTOMY      WISDOM TOOTH EXTRACTION         Review of patient's allergies indicates:   Allergen Reactions    Ciprofloxacin Itching and Rash    Nitrofurantoin monohyd/m-cryst Hives    Penicillins Hives     Patient broke out in hives. Patient also said she had hives in her throat       No current facility-administered medications on file prior to encounter.     Current Outpatient Medications on File Prior to Encounter   Medication Sig    albuterol (PROAIR HFA) 90 mcg/actuation inhaler INHALE TWO PUFFS EVERY 4 TO 6 HOURS **THANK YOU**    busPIRone (BUSPAR) 7.5 MG tablet Take 7.5 mg by mouth 2 (two) times a day.    calcitRIOL (ROCALTROL) 0.25 MCG Cap Take 0.25 mcg by mouth once daily.    diltiaZEM (CARDIZEM CD) 120 MG Cp24 Take 120 mg by mouth 2 (two) times daily.    fluticasone (FLONASE) 50 mcg/actuation nasal spray 2 sprays by Each Nare route 2 (two) times daily.    fluticasone-salmeterol diskus inhaler 500-50 mcg Inhale 1 puff into the lungs 2 (two) times daily.    gabapentin (NEURONTIN) 400 MG capsule Take by mouth 4 (four) times daily.     levothyroxine (SYNTHROID) 125 MCG tablet Take 125 mcg by mouth before breakfast.    montelukast (SINGULAIR) 10 mg tablet Take 10 mg by mouth once daily.    oxyCODONE-acetaminophen (PERCOCET) 7.5-325 mg per tablet Take 1 tablet by mouth 4 (four) times daily as needed.    tiotropium (SPIRIVA) 18 mcg inhalation capsule Inhale 18 mcg into the lungs once daily. Controller    tiZANidine (ZANAFLEX) 4 MG tablet Take 1 tablet (4 mg total) by mouth every 8 (eight) hours. for 3 days    albuterol (PROVENTIL) 2.5 mg /3 mL (0.083 %) nebulizer solution INHALE 1  VIAL VIA NEBULIZER EVERY 4 HOURS AS NEEDED **THANK YOU**    calcium carbonate-magnesium hydroxide (ROLAIDS) 550-110 mg Chew Take 2,000 mg by mouth 3 (three) times daily as needed.    ondansetron (ZOFRAN-ODT) 4 MG TbDL Take 1 tablet (4 mg total) by mouth every 12 (twelve) hours as needed (nausea or vomiting).    [DISCONTINUED] ADVAIR DISKUS 250-50 mcg/dose diskus inhaler USE 1 INHALATION TWICE DAILY **THANK YOU**    [DISCONTINUED] diltiaZEM (CARDIZEM) 120 MG tablet Take 120 mg by mouth 2 (two) times a day.     Family History       Problem Relation (Age of Onset)    Cancer Father    Diabetes Maternal Grandmother          Tobacco Use    Smoking status: Former     Packs/day: 1.00     Years: 30.00     Pack years: 30.00     Types: Cigarettes     Quit date: 1/3/2012     Years since quittin.1    Smokeless tobacco: Not on file   Substance and Sexual Activity    Alcohol use: No    Drug use: No    Sexual activity: Never     Birth control/protection: None     Review of Systems   Constitutional:  Positive for fatigue. Negative for chills and fever.   Respiratory:  Positive for cough and shortness of breath.    Musculoskeletal:  Positive for back pain.   Neurological:  Positive for weakness.   All other systems reviewed and are negative.  Objective:     Vital Signs (Most Recent):  Temp: 98.1 °F (36.7 °C) (23 1003)  Pulse: 106 (23 1700)  Resp: 20 (23 1700)  BP: 110/65 (23 1700)  SpO2: 95 % (23 1700) Vital Signs (24h Range):  Temp:  [98.1 °F (36.7 °C)] 98.1 °F (36.7 °C)  Pulse:  [] 106  Resp:  [15-20] 20  SpO2:  [89 %-97 %] 95 %  BP: (104-134)/(65-86) 110/65     Weight: 60 kg (132 lb 4.4 oz)  Body mass index is 22.71 kg/m².    Physical Exam  Constitutional:       Appearance: She is ill-appearing.   HENT:      Head: Normocephalic and atraumatic.   Cardiovascular:      Rate and Rhythm: Normal rate and regular rhythm.      Heart sounds: No murmur heard.  Pulmonary:      Effort: Pulmonary effort  is normal. No respiratory distress.      Breath sounds: Rhonchi and rales present. No wheezing.   Abdominal:      General: Bowel sounds are normal. There is no distension.      Palpations: Abdomen is soft.      Tenderness: There is no abdominal tenderness.   Musculoskeletal:         General: No swelling.      Right lower leg: No edema.      Left lower leg: No edema.   Skin:     General: Skin is warm and dry.   Neurological:      Mental Status: She is alert and oriented to person, place, and time. Mental status is at baseline.           Significant Labs: All pertinent labs within the past 24 hours have been reviewed.  CBC:   Recent Labs   Lab 02/20/23  1052   WBC 30.84*   HGB 10.0*   HCT 32.8*        CMP:   Recent Labs   Lab 02/20/23  1052   *   K 4.5   CL 87*   CO2 34*   GLU 75   BUN 7*   CREATININE 0.7   CALCIUM 7.7*   PROT 6.6   ALBUMIN 3.0*   BILITOT 0.5   ALKPHOS 149*   AST 61*   ALT 18   ANIONGAP 14     Lactic Acid:   Recent Labs   Lab 02/20/23  1052   LACTATE 1.5       Significant Imaging: I have reviewed all pertinent imaging results/findings within the past 24 hours.

## 2023-02-20 NOTE — ASSESSMENT & PLAN NOTE
Previously treated for Pseudomonal pneumonia  Change Levaquin to Merrem  Added doxycycline  Obtain sputum culture  Consulted pulm for further recommendations

## 2023-02-20 NOTE — H&P
[facility] - Emergency Dept.  [facility] Medicine  History & Physical    Patient Name: Christine Horner  MRN: [id number]  Patient Class: IP- Inpatient  Admission Date: 2023  Attending Physician: Tricia García MD   Primary Care Provider: Eloy Hdez MD         Patient information was obtained from patient, past medical records and ER records.     Subjective:     Principal Problem:<principal problem not specified>    Chief Complaint:   Chief Complaint   Patient presents with    Shortness of Breath     Recent pneumonia and UTI        HPI: The patient is a 62 yo female with past medical history of anemia, arthritis, COPD, chronic respiratory failure on home oxygen, pneumonia and chronic back pain who presented to the ED with increased oxygen requirements. She is usually on 3L nasal cannula. She was discharged from the hospital last week on Levaquin for pneumonia. Home health went to see patient today and found her oxygen saturation to be 82% on 3L.  Her flow was increased to 5L with slow improvement to low 90s. She was instructed to come to the ED for further evaluation. She reports she was tired and weak over the weekend. Workup in the ED with increasing density in right lung on CT chest. She reports cough is becoming productive. The sputum is yellow greenish color. Previously cough was not productive.Hospital medicine consulted for admission. Discussed CT findings with patient.       Past Medical History:   Diagnosis Date    Anemia 2023    Arthritis     Chronic back pain     COPD (chronic obstructive pulmonary disease)     COPD with acute exacerbation 2016    Pneumonia     SOB (shortness of breath)        Past Surgical History:   Procedure Laterality Date    BACK SURGERY       SECTION      TONSILLECTOMY      WISDOM TOOTH EXTRACTION         Review of patient's allergies indicates:   Allergen Reactions    Ciprofloxacin Itching and Rash    Nitrofurantoin monohyd/m-cryst Hives     Penicillins Hives     Patient broke out in hives. Patient also said she had hives in her throat       No current facility-administered medications on file prior to encounter.     Current Outpatient Medications on File Prior to Encounter   Medication Sig    albuterol (PROAIR HFA) 90 mcg/actuation inhaler INHALE TWO PUFFS EVERY 4 TO 6 HOURS **THANK YOU**    busPIRone (BUSPAR) 7.5 MG tablet Take 7.5 mg by mouth 2 (two) times a day.    calcitRIOL (ROCALTROL) 0.25 MCG Cap Take 0.25 mcg by mouth once daily.    diltiaZEM (CARDIZEM CD) 120 MG Cp24 Take 120 mg by mouth 2 (two) times daily.    fluticasone (FLONASE) 50 mcg/actuation nasal spray 2 sprays by Each Nare route 2 (two) times daily.    fluticasone-salmeterol diskus inhaler 500-50 mcg Inhale 1 puff into the lungs 2 (two) times daily.    gabapentin (NEURONTIN) 400 MG capsule Take by mouth 4 (four) times daily.     levothyroxine (SYNTHROID) 125 MCG tablet Take 125 mcg by mouth before breakfast.    montelukast (SINGULAIR) 10 mg tablet Take 10 mg by mouth once daily.    oxyCODONE-acetaminophen (PERCOCET) 7.5-325 mg per tablet Take 1 tablet by mouth 4 (four) times daily as needed.    tiotropium (SPIRIVA) 18 mcg inhalation capsule Inhale 18 mcg into the lungs once daily. Controller    tiZANidine (ZANAFLEX) 4 MG tablet Take 1 tablet (4 mg total) by mouth every 8 (eight) hours. for 3 days    albuterol (PROVENTIL) 2.5 mg /3 mL (0.083 %) nebulizer solution INHALE 1 VIAL VIA NEBULIZER EVERY 4 HOURS AS NEEDED **THANK YOU**    calcium carbonate-magnesium hydroxide (ROLAIDS) 550-110 mg Chew Take 2,000 mg by mouth 3 (three) times daily as needed.    ondansetron (ZOFRAN-ODT) 4 MG TbDL Take 1 tablet (4 mg total) by mouth every 12 (twelve) hours as needed (nausea or vomiting).    [DISCONTINUED] ADVAIR DISKUS 250-50 mcg/dose diskus inhaler USE 1 INHALATION TWICE DAILY **THANK YOU**    [DISCONTINUED] diltiaZEM (CARDIZEM) 120 MG tablet Take 120 mg by mouth 2 (two) times a  day.     Family History       Problem Relation (Age of Onset)    Cancer Father    Diabetes Maternal Grandmother          Tobacco Use    Smoking status: Former     Packs/day: 1.00     Years: 30.00     Pack years: 30.00     Types: Cigarettes     Quit date: 1/3/2012     Years since quittin.1    Smokeless tobacco: Not on file   Substance and Sexual Activity    Alcohol use: No    Drug use: No    Sexual activity: Never     Birth control/protection: None     Review of Systems   Constitutional:  Positive for fatigue. Negative for chills and fever.   Respiratory:  Positive for cough and shortness of breath.    Musculoskeletal:  Positive for back pain.   Neurological:  Positive for weakness.   All other systems reviewed and are negative.  Objective:     Vital Signs (Most Recent):  Temp: 98.1 °F (36.7 °C) (23 1003)  Pulse: 106 (23 1700)  Resp: 20 (23 1700)  BP: 110/65 (23 1700)  SpO2: 95 % (23 1700) Vital Signs (24h Range):  Temp:  [98.1 °F (36.7 °C)] 98.1 °F (36.7 °C)  Pulse:  [] 106  Resp:  [15-20] 20  SpO2:  [89 %-97 %] 95 %  BP: (104-134)/(65-86) 110/65     Weight: 60 kg (132 lb 4.4 oz)  Body mass index is 22.71 kg/m².    Physical Exam  Constitutional:       Appearance: She is ill-appearing.   HENT:      Head: Normocephalic and atraumatic.   Cardiovascular:      Rate and Rhythm: Normal rate and regular rhythm.      Heart sounds: No murmur heard.  Pulmonary:      Effort: Pulmonary effort is normal. No respiratory distress.      Breath sounds: Rhonchi and rales present. No wheezing.   Abdominal:      General: Bowel sounds are normal. There is no distension.      Palpations: Abdomen is soft.      Tenderness: There is no abdominal tenderness.   Musculoskeletal:         General: No swelling.      Right lower leg: No edema.      Left lower leg: No edema.   Skin:     General: Skin is warm and dry.   Neurological:      Mental Status: She is alert and oriented to person, place, and  time. Mental status is at baseline.           Significant Labs: All pertinent labs within the past 24 hours have been reviewed.  CBC:   Recent Labs   Lab 02/20/23  1052   WBC 30.84*   HGB 10.0*   HCT 32.8*        CMP:   Recent Labs   Lab 02/20/23  1052   *   K 4.5   CL 87*   CO2 34*   GLU 75   BUN 7*   CREATININE 0.7   CALCIUM 7.7*   PROT 6.6   ALBUMIN 3.0*   BILITOT 0.5   ALKPHOS 149*   AST 61*   ALT 18   ANIONGAP 14     Lactic Acid:   Recent Labs   Lab 02/20/23  1052   LACTATE 1.5       Significant Imaging: I have reviewed all pertinent imaging results/findings within the past 24 hours.    Assessment/Plan:     Pneumonia involving right lung  Previously treated for Pseudomonal pneumonia  Change Levaquin to Merrem  Added doxycycline  Obtain sputum culture  Consulted pulm for further recommendations      CLL (chronic lymphocytic leukemia)  Followed by outside heme-onc        Postablative hypothyroidism  Continue levothyroxine      Paroxysmal SVT (supraventricular tachycardia)  Continue home medications  Heart rate currently controlled      Acute on chronic respiratory failure with hypoxia  Patient with Hypercapnic and Hypoxic Respiratory failure which is Acute on chronic.  she is on home oxygen at 3 LPM. Supplemental oxygen was provided and noted- Oxygen Concentration (%):  [36] 36.   Signs/symptoms of respiratory failure include- tachypnea, increased work of breathing and lethargy. Contributing diagnoses includes - COPD, Pleural effusion and Pneumonia Labs and images were reviewed. Patient Has recent ABG, which has been reviewed. Will treat underlying causes and adjust management of respiratory failure as follows- IV antibiotics, supplemental oxygen, prn breathing treatments    VTE Risk Mitigation (From admission, onward)         Ordered     enoxaparin injection 40 mg  Daily         02/20/23 1656     IP VTE HIGH RISK PATIENT  Once         02/20/23 1656     Place sequential compression device  Until  discontinued         02/20/23 1656                   Tricia García MD  Department of Hospital Medicine   [facility] hospital - Emergency Dept.

## 2023-02-21 DIAGNOSIS — J44.9 COPD, VERY SEVERE: Primary | Chronic | ICD-10-CM

## 2023-02-21 LAB
ACANTHOCYTES BLD QL SMEAR: PRESENT
ANION GAP SERPL CALC-SCNC: 12 MMOL/L (ref 8–16)
ANISOCYTOSIS BLD QL SMEAR: SLIGHT
BASOPHILS # BLD AUTO: 0.12 K/UL (ref 0–0.2)
BASOPHILS NFR BLD: 0.8 % (ref 0–1.9)
BUN SERPL-MCNC: 7 MG/DL (ref 8–23)
CALCIUM SERPL-MCNC: 7 MG/DL (ref 8.7–10.5)
CHLORIDE SERPL-SCNC: 87 MMOL/L (ref 95–110)
CO2 SERPL-SCNC: 36 MMOL/L (ref 23–29)
CREAT SERPL-MCNC: 0.7 MG/DL (ref 0.5–1.4)
DACRYOCYTES BLD QL SMEAR: ABNORMAL
DIFFERENTIAL METHOD: ABNORMAL
EOSINOPHIL # BLD AUTO: 0.7 K/UL (ref 0–0.5)
EOSINOPHIL NFR BLD: 4.7 % (ref 0–8)
ERYTHROCYTE [DISTWIDTH] IN BLOOD BY AUTOMATED COUNT: 15.3 % (ref 11.5–14.5)
EST. GFR  (NO RACE VARIABLE): >60 ML/MIN/1.73 M^2
GLUCOSE SERPL-MCNC: 92 MG/DL (ref 70–110)
HCT VFR BLD AUTO: 26.2 % (ref 37–48.5)
HGB BLD-MCNC: 8.1 G/DL (ref 12–16)
IMM GRANULOCYTES # BLD AUTO: 0.11 K/UL (ref 0–0.04)
IMM GRANULOCYTES NFR BLD AUTO: 0.7 % (ref 0–0.5)
LYMPHOCYTES # BLD AUTO: 8.5 K/UL (ref 1–4.8)
LYMPHOCYTES NFR BLD: 54.1 % (ref 18–48)
MAGNESIUM SERPL-MCNC: 1.6 MG/DL (ref 1.6–2.6)
MCH RBC QN AUTO: 27.3 PG (ref 27–31)
MCHC RBC AUTO-ENTMCNC: 30.9 G/DL (ref 32–36)
MCV RBC AUTO: 88 FL (ref 82–98)
MONOCYTES # BLD AUTO: 0.7 K/UL (ref 0.3–1)
MONOCYTES NFR BLD: 4.3 % (ref 4–15)
NEUTROPHILS # BLD AUTO: 5.5 K/UL (ref 1.8–7.7)
NEUTROPHILS NFR BLD: 35.4 % (ref 38–73)
NRBC BLD-RTO: 0 /100 WBC
PHOSPHATE SERPL-MCNC: 3.6 MG/DL (ref 2.7–4.5)
PLATELET # BLD AUTO: 302 K/UL (ref 150–450)
PLATELET BLD QL SMEAR: ABNORMAL
PMV BLD AUTO: 9.8 FL (ref 9.2–12.9)
POIKILOCYTOSIS BLD QL SMEAR: SLIGHT
POTASSIUM SERPL-SCNC: 3.8 MMOL/L (ref 3.5–5.1)
RBC # BLD AUTO: 2.97 M/UL (ref 4–5.4)
SODIUM SERPL-SCNC: 135 MMOL/L (ref 136–145)
STOMATOCYTES BLD QL SMEAR: PRESENT
TARGETS BLD QL SMEAR: ABNORMAL
WBC # BLD AUTO: 15.61 K/UL (ref 3.9–12.7)

## 2023-02-21 PROCEDURE — 25000242 PHARM REV CODE 250 ALT 637 W/ HCPCS: Performed by: NURSE PRACTITIONER

## 2023-02-21 PROCEDURE — 11000001 HC ACUTE MED/SURG PRIVATE ROOM

## 2023-02-21 PROCEDURE — 63600175 PHARM REV CODE 636 W HCPCS: Mod: TB,JG | Performed by: INTERNAL MEDICINE

## 2023-02-21 PROCEDURE — 94799 UNLISTED PULMONARY SVC/PX: CPT

## 2023-02-21 PROCEDURE — 94761 N-INVAS EAR/PLS OXIMETRY MLT: CPT

## 2023-02-21 PROCEDURE — 63600175 PHARM REV CODE 636 W HCPCS: Performed by: INTERNAL MEDICINE

## 2023-02-21 PROCEDURE — 99900035 HC TECH TIME PER 15 MIN (STAT)

## 2023-02-21 PROCEDURE — 25000003 PHARM REV CODE 250: Performed by: NURSE PRACTITIONER

## 2023-02-21 PROCEDURE — 85025 COMPLETE CBC W/AUTO DIFF WBC: CPT | Performed by: INTERNAL MEDICINE

## 2023-02-21 PROCEDURE — 27000221 HC OXYGEN, UP TO 24 HOURS

## 2023-02-21 PROCEDURE — 94640 AIRWAY INHALATION TREATMENT: CPT

## 2023-02-21 PROCEDURE — 21400001 HC TELEMETRY ROOM

## 2023-02-21 PROCEDURE — 83735 ASSAY OF MAGNESIUM: CPT | Performed by: INTERNAL MEDICINE

## 2023-02-21 PROCEDURE — 97530 THERAPEUTIC ACTIVITIES: CPT

## 2023-02-21 PROCEDURE — 80048 BASIC METABOLIC PNL TOTAL CA: CPT | Performed by: INTERNAL MEDICINE

## 2023-02-21 PROCEDURE — 25000003 PHARM REV CODE 250: Performed by: INTERNAL MEDICINE

## 2023-02-21 PROCEDURE — 84100 ASSAY OF PHOSPHORUS: CPT | Performed by: INTERNAL MEDICINE

## 2023-02-21 PROCEDURE — 36415 COLL VENOUS BLD VENIPUNCTURE: CPT | Performed by: INTERNAL MEDICINE

## 2023-02-21 PROCEDURE — 25000242 PHARM REV CODE 250 ALT 637 W/ HCPCS: Performed by: INTERNAL MEDICINE

## 2023-02-21 PROCEDURE — 97162 PT EVAL MOD COMPLEX 30 MIN: CPT

## 2023-02-21 RX ORDER — CALCITRIOL 0.25 UG/1
0.25 CAPSULE ORAL DAILY
Status: DISCONTINUED | OUTPATIENT
Start: 2023-02-21 | End: 2023-02-28 | Stop reason: HOSPADM

## 2023-02-21 RX ORDER — TIZANIDINE 4 MG/1
4 TABLET ORAL EVERY 8 HOURS PRN
Status: DISCONTINUED | OUTPATIENT
Start: 2023-02-21 | End: 2023-02-28 | Stop reason: HOSPADM

## 2023-02-21 RX ORDER — TIZANIDINE 4 MG/1
4 TABLET ORAL ONCE
Status: COMPLETED | OUTPATIENT
Start: 2023-02-21 | End: 2023-02-21

## 2023-02-21 RX ORDER — SODIUM CHLORIDE FOR INHALATION 3 %
4 VIAL, NEBULIZER (ML) INHALATION EVERY 6 HOURS
Status: DISCONTINUED | OUTPATIENT
Start: 2023-02-21 | End: 2023-02-22

## 2023-02-21 RX ADMIN — OXYCODONE AND ACETAMINOPHEN 1 TABLET: 7.5; 325 TABLET ORAL at 10:02

## 2023-02-21 RX ADMIN — BUSPIRONE HYDROCHLORIDE 7.5 MG: 5 TABLET ORAL at 10:02

## 2023-02-21 RX ADMIN — CALCITRIOL CAPSULES 0.25 MCG 0.25 MCG: 0.25 CAPSULE ORAL at 10:02

## 2023-02-21 RX ADMIN — GABAPENTIN 400 MG: 400 CAPSULE ORAL at 09:02

## 2023-02-21 RX ADMIN — ARFORMOTEROL TARTRATE 15 MCG: 15 SOLUTION RESPIRATORY (INHALATION) at 07:02

## 2023-02-21 RX ADMIN — GABAPENTIN 400 MG: 400 CAPSULE ORAL at 04:02

## 2023-02-21 RX ADMIN — BUDESONIDE 0.5 MG: 0.5 INHALANT ORAL at 07:02

## 2023-02-21 RX ADMIN — IPRATROPIUM BROMIDE 0.5 MG: 0.5 SOLUTION RESPIRATORY (INHALATION) at 01:02

## 2023-02-21 RX ADMIN — DOXYCYCLINE HYCLATE 100 MG: 100 TABLET, COATED ORAL at 09:02

## 2023-02-21 RX ADMIN — IPRATROPIUM BROMIDE 0.5 MG: 0.5 SOLUTION RESPIRATORY (INHALATION) at 07:02

## 2023-02-21 RX ADMIN — MEROPENEM AND SODIUM CHLORIDE 500 MG: 500 INJECTION, SOLUTION INTRAVENOUS at 06:02

## 2023-02-21 RX ADMIN — SODIUM CHLORIDE 30 MG/ML INHALATION SOLUTION 4 ML: 30 SOLUTION INHALANT at 07:02

## 2023-02-21 RX ADMIN — DILTIAZEM HYDROCHLORIDE 120 MG: 120 CAPSULE, COATED, EXTENDED RELEASE ORAL at 09:02

## 2023-02-21 RX ADMIN — MEROPENEM AND SODIUM CHLORIDE 500 MG: 500 INJECTION, SOLUTION INTRAVENOUS at 12:02

## 2023-02-21 RX ADMIN — OXYCODONE AND ACETAMINOPHEN 1 TABLET: 7.5; 325 TABLET ORAL at 04:02

## 2023-02-21 RX ADMIN — HYPROMELLOSE 2910 1 DROP: 5 SOLUTION/ DROPS OPHTHALMIC at 02:02

## 2023-02-21 RX ADMIN — OXYCODONE AND ACETAMINOPHEN 1 TABLET: 7.5; 325 TABLET ORAL at 02:02

## 2023-02-21 RX ADMIN — GABAPENTIN 400 MG: 400 CAPSULE ORAL at 12:02

## 2023-02-21 RX ADMIN — BUSPIRONE HYDROCHLORIDE 7.5 MG: 5 TABLET ORAL at 09:02

## 2023-02-21 RX ADMIN — OXYCODONE AND ACETAMINOPHEN 1 TABLET: 7.5; 325 TABLET ORAL at 09:02

## 2023-02-21 RX ADMIN — MONTELUKAST 10 MG: 10 TABLET, FILM COATED ORAL at 09:02

## 2023-02-21 RX ADMIN — TIZANIDINE 4 MG: 4 TABLET ORAL at 06:02

## 2023-02-21 RX ADMIN — LEVOTHYROXINE SODIUM 125 MCG: 125 TABLET ORAL at 06:02

## 2023-02-21 RX ADMIN — MEROPENEM AND SODIUM CHLORIDE 500 MG: 500 INJECTION, SOLUTION INTRAVENOUS at 05:02

## 2023-02-21 RX ADMIN — SODIUM CHLORIDE 30 MG/ML INHALATION SOLUTION 4 ML: 30 SOLUTION INHALANT at 01:02

## 2023-02-21 RX ADMIN — TIZANIDINE 4 MG: 4 TABLET ORAL at 01:02

## 2023-02-21 RX ADMIN — ENOXAPARIN SODIUM 40 MG: 40 INJECTION SUBCUTANEOUS at 05:02

## 2023-02-21 RX ADMIN — TIZANIDINE 4 MG: 4 TABLET ORAL at 09:02

## 2023-02-21 NOTE — SUBJECTIVE & OBJECTIVE
63 year old female with a known past medical history of anemia, arthritis, COPD, chronic respiratory failure on home oxygen at 3L NC, pneumonia, and chronic back pain who presented to the ED 2/20 with increased oxygen requirements. Of note, she was discharged from the hospital last week on Levaquin for pneumonia. Home health went to see patient today and found her oxygen saturation to be 82% on 3L.  Her flow was increased to 5L with slow improvement to low 90s. She was instructed to come to the ED for further evaluation. She reports she was tired and weak over the weekend. Workup in the ED with increasing density in right lung on CT chest. She reports cough is becoming productive. The sputum is yellow greenish color. Previously cough was not productive. Hospital medicine consulted for admission. Discussed CT findings with patient. Pulmonary was consulted for the above.    At the time of my exam in the ER, the pt is awake and alert on 3L NC. She is able to speak in full sentences and is without any specific compliants. No family at bedside.    Interval History:  2/21: did not sleep last night as she boarded in the ER, remains on 3L NC, with cough that is nonproductive     ROS complete and negative unless stated in the interval HPI    Objective:     Vital Signs (Most Recent):  Temp: 98.4 °F (36.9 °C) (02/21/23 0934)  Pulse: 100 (02/21/23 0934)  Resp: 18 (02/21/23 1001)  BP: 108/67 (02/21/23 0934)  SpO2: (!) 92 % (02/21/23 0934)   Vital Signs (24h Range):  Temp:  [97.9 °F (36.6 °C)-98.4 °F (36.9 °C)] 98.4 °F (36.9 °C)  Pulse:  [] 100  Resp:  [15-20] 18  SpO2:  [89 %-99 %] 92 %  BP: (104-132)/(55-85) 108/67     Weight: 61.5 kg (135 lb 9.3 oz)  Body mass index is 22.56 kg/m².      Intake/Output Summary (Last 24 hours) at 2/21/2023 1032  Last data filed at 2/21/2023 0825  Gross per 24 hour   Intake 1338.2 ml   Output 1000 ml   Net 338.2 ml       Physical Exam  Vitals reviewed.   Constitutional:       General: She is  awake. She is not in acute distress.     Appearance: She is well-developed.   Cardiovascular:      Rate and Rhythm: Normal rate.      Pulses:           Radial pulses are 2+ on the right side and 2+ on the left side.   Pulmonary:      Effort: Pulmonary effort is normal.      Breath sounds: Rhonchi present.      Comments: On 3L  Abdominal:      General: There is no distension.      Palpations: Abdomen is soft.   Musculoskeletal:      Right lower leg: No edema.      Left lower leg: No edema.   Skin:     General: Skin is warm and dry.   Neurological:      General: No focal deficit present.      Mental Status: She is alert.   Psychiatric:         Behavior: Behavior is cooperative.     Vents:  Oxygen Concentration (%): 36 (02/21/23 0825)    Lines/Drains/Airways       Peripheral Intravenous Line  Duration                  Peripheral IV - Single Lumen 02/20/23 1100 20 G Anterior;Distal;Left Forearm <1 day         Peripheral IV - Single Lumen 02/20/23 1500 18 G Left Antecubital <1 day                    Significant Labs:    CBC/Anemia Profile:  Recent Labs   Lab 02/20/23  1052 02/21/23  0537   WBC 30.84* 15.61*   HGB 10.0* 8.1*   HCT 32.8* 26.2*    302   MCV 89 88   RDW 15.1* 15.3*        Chemistries:  Recent Labs   Lab 02/20/23  1052 02/21/23  0537   * 135*   K 4.5 3.8   CL 87* 87*   CO2 34* 36*   BUN 7* 7*   CREATININE 0.7 0.7   CALCIUM 7.7* 7.0*   ALBUMIN 3.0*  --    PROT 6.6  --    BILITOT 0.5  --    ALKPHOS 149*  --    ALT 18  --    AST 61*  --    MG 1.4* 1.6   PHOS  --  3.6       All pertinent labs within the past 24 hours have been reviewed.    Significant Imaging:  I have reviewed all pertinent imaging results/findings within the past 24 hours.

## 2023-02-21 NOTE — PROGRESS NOTES
[facility] Medicine  Progress Note    Patient Name: Christine Hroner  MRN: [id number]  Patient Class: IP- Inpatient   Admission Date: 2/20/2023  Length of Stay: 1 days  Attending Physician: Tricia García MD  Primary Care Provider: Eloy Hdez MD        Subjective:     Principal Problem:Pneumonia involving right lung        HPI:  The patient is a 64 yo female with past medical history of anemia, arthritis, COPD, chronic respiratory failure on home oxygen, pneumonia and chronic back pain who presented to the ED with increased oxygen requirements. She is usually on 3L nasal cannula. She was discharged from the hospital last week on Levaquin for pneumonia. Home health went to see patient today and found her oxygen saturation to be 82% on 3L.  Her flow was increased to 5L with slow improvement to low 90s. She was instructed to come to the ED for further evaluation. She reports she was tired and weak over the weekend. Workup in the ED with increasing density in right lung on CT chest. She reports cough is becoming productive. The sputum is yellow greenish color. Previously cough was not productive.Hospital medicine consulted for admission. Discussed CT findings with patient.       Overview/Hospital Course:  No notes on file    Interval History: patient reports she is tired as she did not sleep in the ED last night. She visited with the patient in the bed next to hers. She asked about LOS. Discussed need culture to be final to ensure appropriate antibiotic being administered.     Review of Systems  Objective:     Vital Signs (Most Recent):  Temp: 98.4 °F (36.9 °C) (02/21/23 0934)  Pulse: 100 (02/21/23 0934)  Resp: 18 (02/21/23 1001)  BP: 108/67 (02/21/23 0934)  SpO2: (!) 92 % (02/21/23 0934)   Vital Signs (24h Range):  Temp:  [97.9 °F (36.6 °C)-98.4 °F (36.9 °C)] 98.4 °F (36.9 °C)  Pulse:  [] 100  Resp:  [15-20] 18  SpO2:  [89 %-99 %] 92 %  BP: (104-133)/(55-85) 108/67     Weight: 61.5 kg  (135 lb 9.3 oz)  Body mass index is 22.56 kg/m².    Intake/Output Summary (Last 24 hours) at 2/21/2023 1012  Last data filed at 2/21/2023 0825  Gross per 24 hour   Intake 1338.2 ml   Output 1000 ml   Net 338.2 ml      Physical Exam  Constitutional:       Appearance: She is ill-appearing.   HENT:      Head: Normocephalic and atraumatic.   Cardiovascular:      Rate and Rhythm: Normal rate and regular rhythm.      Heart sounds: No murmur heard.  Pulmonary:      Effort: Pulmonary effort is normal. No respiratory distress.      Breath sounds: Decreased breath sounds and rhonchi present. No wheezing.   Abdominal:      General: Bowel sounds are normal. There is no distension.      Palpations: Abdomen is soft.      Tenderness: There is no abdominal tenderness.   Musculoskeletal:         General: No swelling.   Skin:     General: Skin is warm and dry.   Neurological:      Mental Status: She is alert and oriented to person, place, and time. Mental status is at baseline.       Significant Labs: All pertinent labs within the past 24 hours have been reviewed.  CBC:   Recent Labs   Lab 02/20/23  1052 02/21/23  0537   WBC 30.84* 15.61*   HGB 10.0* 8.1*   HCT 32.8* 26.2*    302     CMP:   Recent Labs   Lab 02/20/23  1052 02/21/23  0537   * 135*   K 4.5 3.8   CL 87* 87*   CO2 34* 36*   GLU 75 92   BUN 7* 7*   CREATININE 0.7 0.7   CALCIUM 7.7* 7.0*   PROT 6.6  --    ALBUMIN 3.0*  --    BILITOT 0.5  --    ALKPHOS 149*  --    AST 61*  --    ALT 18  --    ANIONGAP 14 12     Respiratory Culture: pending    Significant Imaging: I have reviewed all pertinent imaging results/findings within the past 24 hours.      Assessment/Plan:      * Pneumonia involving right lung  Previously treated for Pseudomonal pneumonia  Continue meropenem and doxycycline  f/u sputum culture  Appreciate pulmonology    CLL (chronic lymphocytic leukemia)  Followed by outside heme-onc        Postablative hypothyroidism  Continue  levothyroxine      Paroxysmal SVT (supraventricular tachycardia)  Continue home medications  Heart rate currently controlled      Acute on chronic respiratory failure with hypoxia  Patient with Hypercapnic and Hypoxic Respiratory failure which is Acute on chronic.  she is on home oxygen at 3 LPM. Supplemental oxygen was provided and noted- Oxygen Concentration (%):  [36] 36.   Signs/symptoms of respiratory failure include- tachypnea, increased work of breathing and lethargy. Contributing diagnoses includes - COPD, Pleural effusion and Pneumonia Labs and images were reviewed. Patient Has recent ABG, which has been reviewed. Will treat underlying causes and adjust management of respiratory failure as follows- IV antibiotics, supplemental oxygen, prn breathing treatments      VTE Risk Mitigation (From admission, onward)         Ordered     enoxaparin injection 40 mg  Daily         02/20/23 1656     IP VTE HIGH RISK PATIENT  Once         02/20/23 1656     Place sequential compression device  Until discontinued         02/20/23 1656                Discharge Planning   MARK:      Code Status: Full Code   Is the patient medically ready for discharge?:     Reason for patient still in hospital (select all that apply): Patient trending condition, Laboratory test, Treatment and Consult recommendations                     Tricia García MD  Department of Hospital Medicine   [facility]

## 2023-02-21 NOTE — ASSESSMENT & PLAN NOTE
Patient with Hypoxic Respiratory failure which is Acute on chronic.  she is on home oxygen at 3 LPM. Supplemental oxygen was provided and noted- Oxygen Concentration (%):  [36] 36.   Signs/symptoms of respiratory failure include- worseing hypoxia on home flow O2. Contributing diagnoses includes - Pneumonia Labs and images were reviewed. Patient Has not had a recent ABG. Will treat underlying causes and adjust management of respiratory failure as follows    - see plan for pneumonia

## 2023-02-21 NOTE — SUBJECTIVE & OBJECTIVE
Interval History: patient reports she is tired as she did not sleep in the ED last night. She visited with the patient in the bed next to hers. She asked about LOS. Discussed need culture to be final to ensure appropriate antibiotic being administered.     Review of Systems  Objective:     Vital Signs (Most Recent):  Temp: 98.4 °F (36.9 °C) (02/21/23 0934)  Pulse: 100 (02/21/23 0934)  Resp: 18 (02/21/23 1001)  BP: 108/67 (02/21/23 0934)  SpO2: (!) 92 % (02/21/23 0934)   Vital Signs (24h Range):  Temp:  [97.9 °F (36.6 °C)-98.4 °F (36.9 °C)] 98.4 °F (36.9 °C)  Pulse:  [] 100  Resp:  [15-20] 18  SpO2:  [89 %-99 %] 92 %  BP: (104-133)/(55-85) 108/67     Weight: 61.5 kg (135 lb 9.3 oz)  Body mass index is 22.56 kg/m².    Intake/Output Summary (Last 24 hours) at 2/21/2023 1012  Last data filed at 2/21/2023 0825  Gross per 24 hour   Intake 1338.2 ml   Output 1000 ml   Net 338.2 ml      Physical Exam  Constitutional:       Appearance: She is ill-appearing.   HENT:      Head: Normocephalic and atraumatic.   Cardiovascular:      Rate and Rhythm: Normal rate and regular rhythm.      Heart sounds: No murmur heard.  Pulmonary:      Effort: Pulmonary effort is normal. No respiratory distress.      Breath sounds: Decreased breath sounds and rhonchi present. No wheezing.   Abdominal:      General: Bowel sounds are normal. There is no distension.      Palpations: Abdomen is soft.      Tenderness: There is no abdominal tenderness.   Musculoskeletal:         General: No swelling.   Skin:     General: Skin is warm and dry.   Neurological:      Mental Status: She is alert and oriented to person, place, and time. Mental status is at baseline.       Significant Labs: All pertinent labs within the past 24 hours have been reviewed.  CBC:   Recent Labs   Lab 02/20/23  1052 02/21/23  0537   WBC 30.84* 15.61*   HGB 10.0* 8.1*   HCT 32.8* 26.2*    302     CMP:   Recent Labs   Lab 02/20/23  1052 02/21/23  0537   * 135*   K 4.5  3.8   CL 87* 87*   CO2 34* 36*   GLU 75 92   BUN 7* 7*   CREATININE 0.7 0.7   CALCIUM 7.7* 7.0*   PROT 6.6  --    ALBUMIN 3.0*  --    BILITOT 0.5  --    ALKPHOS 149*  --    AST 61*  --    ALT 18  --    ANIONGAP 14 12     Respiratory Culture: pending    Significant Imaging: I have reviewed all pertinent imaging results/findings within the past 24 hours.

## 2023-02-21 NOTE — PLAN OF CARE
PT EVAL complete. Required SPV for bed mobility, CGA for sit to stand, MIN A for standing balance. Recommending HHPT with 24/7 care upon d/c.

## 2023-02-21 NOTE — ASSESSMENT & PLAN NOTE
- previous pseudomonas pneumonia in Nov 2022   - admitted earlier this month for pneumonia and was discharge on levaquin, pt returned to the ER 2/20 for increased O2 needs per her home ritika team   - remains on 3L NC which is her baseline  - continue merrem  - sputum culture pending if able to produce sample, add 3% nebs to help loosen sputum   - PT, IS, ambulate

## 2023-02-21 NOTE — PT/OT/SLP EVAL
Physical Therapy Evaluation    Patient Name:  Christine Horner   MRN:  [id number]    Recommendations:     Discharge Recommendations: home with home health (with 24/7 care)   Discharge Equipment Recommendations: none   Barriers to discharge: None    Assessment:     Christine Horner is a 63 y.o. female admitted with a medical diagnosis of Pneumonia involving right lung.  She presents with the following impairments/functional limitations: weakness, impaired endurance, impaired functional mobility, gait instability, impaired balance, pain, decreased safety awareness, decreased lower extremity function, decreased coordination, impaired cardiopulmonary response to activity.    Rehab Prognosis: Good; patient would benefit from acute skilled PT services to address these deficits and reach maximum level of function.    Recent Surgery: * No surgery found *      Plan:     During this hospitalization, patient to be seen 3 x/week to address the identified rehab impairments via gait training, therapeutic activities, therapeutic exercises and progress toward the following goals:    Plan of Care Expires:  03/07/23    Subjective     Chief Complaint:   Patient/Family Comments/goals: Pt would like to get back to walking.  Pain/Comfort:  Pain Rating 1: 4/10  Location - Side 1: Bilateral  Location - Orientation 1: upper  Location 1: back  Pain Addressed 1: Reposition, Distraction    Patients cultural, spiritual, Orthodox conflicts given the current situation: no    Living Environment:  Pt inconsistent when reporting hx. Pt lives with   in a 1 story home, threshold to enter,  is able to assist when needed but does work and she is home alone during the day. Her daughter lives/works close by and can be with her if needed.  Prior to admission, patient required assistance from  for bathing and dressing, she was ambulating household distances with a Rolator but fell and fractured ribs a few months back, she has had   limited household ambulation due to weakness, uses a w/c in house along with her Rolator, can transfer to w/c using stand pivot. Pt inconsistent with assistance levels prior to admission. Equipment used at home: oxygen, wheelchair, rollator, bedside commode.  DME owned (not currently used): none.  Upon discharge, patient will have assistance from .    Objective:     Communicated with nurse prior to session.  Patient found supine with peripheral IV, telemetry, oxygen, blood pressure cuff, pulse ox (continuous)  upon PT entry to room.    General Precautions: Standard, fall  Orthopedic Precautions:N/A   Braces: N/A  Respiratory Status: Nasal cannula, flow 3 L/min    Exams:  Cognitive Exam:  Patient is oriented to Person, Place, Time, and Situation  Sensation:    -       Intact  Skin Integrity/Edema:      -       Skin integrity: Visible skin intact  RLE ROM: WFL  RLE Strength: Grossly 3+/5  LLE ROM: WFL  LLE Strength: Grossly 3+/5    Functional Mobility:  Bed Mobility:     Rolling Right: supervision  Scooting: supervision  Supine to Sit: supervision  Transfers:     Sit to Stand:  minimum assistance with hand-held assist, x2 trails  Balance: Demonstrated good sitting balance, fair standing balance, 1 instance of LOB when 1st standing requiring MIN A to recover  Gait was not progressed due to increased fatigue, limited by lines, pt about to moved from ER      AM-PAC 6 CLICK MOBILITY  Total Score:14       Treatment & Education:  Pt educated on role of PT in acute care and POC. Educated on importance of HEP (hip flex, LAQ, ham curls, ankle pumps) in order to maintain/regain strength. Educated on increased risk of falling due to weakness, instructed to utilize call bell for assistance for all transfers. Pt agreeable to all requests.      Patient left supine with all lines intact and call button in reach.    GOALS:   Multidisciplinary Problems       Physical Therapy Goals          Problem: Physical Therapy    Goal  Priority Disciplines Outcome Goal Variances Interventions   Physical Therapy Goal     PT, PT/OT      Description: Goals to be met by 3/7/23  Pt will complete bed mobility MOD I.  Pt will complete sit to stand MOD I.  Pt will ambulate 150ft MOD I using RW.                       History:     Past Medical History:   Diagnosis Date    Anemia 2023    Arthritis     Chronic back pain     COPD (chronic obstructive pulmonary disease)     COPD with acute exacerbation 2016    Pneumonia     SOB (shortness of breath)        Past Surgical History:   Procedure Laterality Date    BACK SURGERY       SECTION      TONSILLECTOMY      WISDOM TOOTH EXTRACTION         Time Tracking:     PT Received On: 23  PT Start Time: 720     PT Stop Time: 745  PT Total Time (min): 25 min     Billable Minutes: Evaluation 10min and Therapeutic Activity 15min      2023

## 2023-02-21 NOTE — PLAN OF CARE
"   02/21/23 1120   Readmission   Why were you hospitalized in the last 30 days? Pneumonia "the same thing"   Why were you readmitted? Alarmed about signs/symptoms;Related to previous admission   When you left the hospital how did you feel? Pt stated she felt like she was discharged too soon   When you left the hospital where did you go? Home with Home Health   Did patient/caregiver refused recommended DC plan? Yes   Tell me about what happened between when you left the hospital and the day you returned. worsening symptoms   When did you start not feeling well? 2 days after d/c   Did you try to manage your symptoms your self? No   Did you call anyone? No   Why?  agency saw pt and sent her to hospital   Did you try to see or did see a doctor or nurse before you came? Yes   Were you seen? Yes   Did you have  a follow-up appointment on discharge? Yes   Did you go? No   Why?   (readmitted to the hospital)   Was this a planned readmission? No     Pt is current with [facility]. Pt has oxygen at home.   "

## 2023-02-21 NOTE — PROGRESS NOTES
[facility]  Pulmonology Progress Note    Patient Name: Christine Horner  MRN: [id number]  Admission Date: 2/20/2023  Hospital Length of Stay: 1 days  Code Status: Full Code  Attending Provider: Tricia García MD  Primary Care Provider: Eloy Hdez MD   Principal Problem: Pneumonia involving right lung    Subjective:     63 year old female with a known past medical history of anemia, arthritis, COPD, chronic respiratory failure on home oxygen at 3L NC, pneumonia, and chronic back pain who presented to the ED 2/20 with increased oxygen requirements. Of note, she was discharged from the hospital last week on Levaquin for pneumonia. Home health went to see patient today and found her oxygen saturation to be 82% on 3L.  Her flow was increased to 5L with slow improvement to low 90s. She was instructed to come to the ED for further evaluation. She reports she was tired and weak over the weekend. Workup in the ED with increasing density in right lung on CT chest. She reports cough is becoming productive. The sputum is yellow greenish color. Previously cough was not productive. Hospital medicine consulted for admission. Discussed CT findings with patient. Pulmonary was consulted for the above.    At the time of my exam in the ER, the pt is awake and alert on 3L NC. She is able to speak in full sentences and is without any specific compliants. No family at bedside.    Interval History:  2/21: did not sleep last night as she boarded in the ER, remains on 3L NC, with cough that is nonproductive     ROS complete and negative unless stated in the interval HPI    Objective:     Vital Signs (Most Recent):  Temp: 98.4 °F (36.9 °C) (02/21/23 0934)  Pulse: 100 (02/21/23 0934)  Resp: 18 (02/21/23 1001)  BP: 108/67 (02/21/23 0934)  SpO2: (!) 92 % (02/21/23 0934)   Vital Signs (24h Range):  Temp:  [97.9 °F (36.6 °C)-98.4 °F (36.9 °C)] 98.4 °F (36.9 °C)  Pulse:  [] 100  Resp:  [15-20] 18  SpO2:  [89 %-99 %] 92 %  BP:  (104-132)/(55-85) 108/67     Weight: 61.5 kg (135 lb 9.3 oz)  Body mass index is 22.56 kg/m².      Intake/Output Summary (Last 24 hours) at 2/21/2023 1032  Last data filed at 2/21/2023 0825  Gross per 24 hour   Intake 1338.2 ml   Output 1000 ml   Net 338.2 ml       Physical Exam  Vitals reviewed.   Constitutional:       General: She is awake. She is not in acute distress.     Appearance: She is well-developed.   Cardiovascular:      Rate and Rhythm: Normal rate.      Pulses:           Radial pulses are 2+ on the right side and 2+ on the left side.   Pulmonary:      Effort: Pulmonary effort is normal.      Breath sounds: Rhonchi present.      Comments: On 3L  Abdominal:      General: There is no distension.      Palpations: Abdomen is soft.   Musculoskeletal:      Right lower leg: No edema.      Left lower leg: No edema.   Skin:     General: Skin is warm and dry.   Neurological:      General: No focal deficit present.      Mental Status: She is alert.   Psychiatric:         Behavior: Behavior is cooperative.     Vents:  Oxygen Concentration (%): 36 (02/21/23 0825)    Lines/Drains/Airways       Peripheral Intravenous Line  Duration                  Peripheral IV - Single Lumen 02/20/23 1100 20 G Anterior;Distal;Left Forearm <1 day         Peripheral IV - Single Lumen 02/20/23 1500 18 G Left Antecubital <1 day                    Significant Labs:    CBC/Anemia Profile:  Recent Labs   Lab 02/20/23  1052 02/21/23  0537   WBC 30.84* 15.61*   HGB 10.0* 8.1*   HCT 32.8* 26.2*    302   MCV 89 88   RDW 15.1* 15.3*        Chemistries:  Recent Labs   Lab 02/20/23  1052 02/21/23  0537   * 135*   K 4.5 3.8   CL 87* 87*   CO2 34* 36*   BUN 7* 7*   CREATININE 0.7 0.7   CALCIUM 7.7* 7.0*   ALBUMIN 3.0*  --    PROT 6.6  --    BILITOT 0.5  --    ALKPHOS 149*  --    ALT 18  --    AST 61*  --    MG 1.4* 1.6   PHOS  --  3.6       All pertinent labs within the past 24 hours have been reviewed.    Significant Imaging:  I have  reviewed all pertinent imaging results/findings within the past 24 hours.      ABG  Recent Labs   Lab 02/20/23  1430   PH 7.425   PO2 72*   PCO2 60.1*   HCO3 39.5*   BE 15     Assessment/Plan:     Pulmonary  * Pneumonia involving right lung  - previous pseudomonas pneumonia in Nov 2022   - admitted earlier this month for pneumonia and was discharge on levaquin, pt returned to the ER 2/20 for increased O2 needs per her home ritika team   - remains on 3L NC which is her baseline  - continue merrem  - sputum culture pending if able to produce sample, add 3% nebs to help loosen sputum   - PT, IS, ambulate     Acute on chronic respiratory failure with hypoxia  Patient with Hypoxic Respiratory failure which is Acute on chronic.  she is on home oxygen at 3 LPM. Supplemental oxygen was provided and noted- Oxygen Concentration (%):  [36] 36.   Signs/symptoms of respiratory failure include- worseing hypoxia on home flow O2. Contributing diagnoses includes - Pneumonia Labs and images were reviewed. Patient Has not had a recent ABG. Will treat underlying causes and adjust management of respiratory failure as follows    - see plan for pneumonia       Pulmonary team will continue to follow along. Please call with questions.       Fly Kahn NP  Pulmonology  [facility]

## 2023-02-21 NOTE — SUBJECTIVE & OBJECTIVE
Past Medical History:   Diagnosis Date    Anemia 2023    Arthritis     Chronic back pain     COPD (chronic obstructive pulmonary disease)     COPD with acute exacerbation 2016    Pneumonia     SOB (shortness of breath)        Past Surgical History:   Procedure Laterality Date    BACK SURGERY       SECTION      TONSILLECTOMY      WISDOM TOOTH EXTRACTION         Review of patient's allergies indicates:   Allergen Reactions    Ciprofloxacin Itching and Rash    Nitrofurantoin monohyd/m-cryst Hives    Penicillins Hives     Patient broke out in hives. Patient also said she had hives in her throat       Family History       Problem Relation (Age of Onset)    Cancer Father    Diabetes Maternal Grandmother          Tobacco Use    Smoking status: Former     Packs/day: 1.00     Years: 30.00     Pack years: 30.00     Types: Cigarettes     Quit date: 1/3/2012     Years since quittin.1    Smokeless tobacco: Not on file   Substance and Sexual Activity    Alcohol use: No    Drug use: No    Sexual activity: Never     Birth control/protection: None         Review of Systems   Constitutional:  Positive for activity change and fatigue. Negative for chills and fever.   Respiratory:  Positive for cough. Negative for choking, shortness of breath and wheezing.    Cardiovascular:  Negative for leg swelling.   All other systems reviewed and are negative.  Objective:     Vital Signs (Most Recent):  Temp: 98.1 °F (36.7 °C) (23 1003)  Pulse: 106 (23 1700)  Resp: 20 (23 1756)  BP: 110/65 (23 1700)  SpO2: 95 % (23 1700) Vital Signs (24h Range):  Temp:  [98.1 °F (36.7 °C)] 98.1 °F (36.7 °C)  Pulse:  [] 106  Resp:  [15-20] 20  SpO2:  [89 %-97 %] 95 %  BP: (104-134)/(65-86) 110/65     Weight: 60 kg (132 lb 4.4 oz)  Body mass index is 22.71 kg/m².      Intake/Output Summary (Last 24 hours) at 2023 1832  Last data filed at 2023 1601  Gross per 24 hour   Intake 1194.05 ml   Output --    Net 1194.05 ml       Physical Exam  Vitals reviewed.   Constitutional:       General: She is awake. She is not in acute distress.     Appearance: She is well-developed.   Cardiovascular:      Rate and Rhythm: Normal rate.      Pulses:           Radial pulses are 2+ on the right side and 2+ on the left side.   Pulmonary:      Effort: Pulmonary effort is normal.      Breath sounds: Decreased breath sounds present.      Comments: On 3L NC  Abdominal:      General: There is no distension.      Palpations: Abdomen is soft.   Musculoskeletal:      Right lower leg: No edema.      Left lower leg: No edema.   Skin:     General: Skin is warm and dry.   Neurological:      Mental Status: She is alert.   Psychiatric:         Behavior: Behavior is cooperative.       Vents:  Oxygen Concentration (%): 36 (02/20/23 1041)    Lines/Drains/Airways       Peripheral Intravenous Line  Duration                  Peripheral IV - Single Lumen 02/20/23 1100 20 G Anterior;Distal;Left Forearm <1 day         Peripheral IV - Single Lumen 02/20/23 1500 18 G Left Antecubital <1 day                    Significant Labs:    CBC/Anemia Profile:  Recent Labs   Lab 02/20/23  1052   WBC 30.84*   HGB 10.0*   HCT 32.8*      MCV 89   RDW 15.1*        Chemistries:  Recent Labs   Lab 02/20/23  1052   *   K 4.5   CL 87*   CO2 34*   BUN 7*   CREATININE 0.7   CALCIUM 7.7*   ALBUMIN 3.0*   PROT 6.6   BILITOT 0.5   ALKPHOS 149*   ALT 18   AST 61*   MG 1.4*       All pertinent labs within the past 24 hours have been reviewed.    Significant Imaging:   I have reviewed all pertinent imaging results/findings within the past 24 hours.

## 2023-02-21 NOTE — ASSESSMENT & PLAN NOTE
Previously treated for Pseudomonal pneumonia  Continue meropenem and doxycycline  f/u sputum culture  Appreciate pulmonology

## 2023-02-21 NOTE — ASSESSMENT & PLAN NOTE
- imaging reviewed  - previous pseudomonas pneumonia in Nov 2022   - admitted earlier this month for pneumonia and was discharge on levaquin  - pt returned to the ER 2/20 for increased O2 needs per her home ritika team  - on 3L (her baseline) in the ER  - on merrem  - sputum culture   - PT, IS, ambulate

## 2023-02-21 NOTE — CONSULTS
[facility] - Emergency Dept.  Pulmonology Consult Note    Patient Name: Christine Horner  MRN: [id number]  Admission Date: 2023  Hospital Length of Stay: 0 days  Code Status: Full Code  Attending Physician: Tricia García MD  Primary Care Provider: Eloy Hdez MD   Principal Problem: Pneumonia involving right lung    Subjective:     HPI:  63 year old female with a known past medical history of anemia, arthritis, COPD, chronic respiratory failure on home oxygen at 3L NC, pneumonia, and chronic back pain who presented to the ED  with increased oxygen requirements. Of note, she was discharged from the hospital last week on Levaquin for pneumonia. Home health went to see patient today and found her oxygen saturation to be 82% on 3L.  Her flow was increased to 5L with slow improvement to low 90s. She was instructed to come to the ED for further evaluation. She reports she was tired and weak over the weekend. Workup in the ED with increasing density in right lung on CT chest. She reports cough is becoming productive. The sputum is yellow greenish color. Previously cough was not productive. Hospital medicine consulted for admission. Discussed CT findings with patient. Pulmonary was consulted for the above.    At the time of my exam in the ER, the pt is awake and alert on 3L NC. She is able to speak in full sentences and is without any specific compliants. No family at bedside.      Past Medical History:   Diagnosis Date    Anemia 2023    Arthritis     Chronic back pain     COPD (chronic obstructive pulmonary disease)     COPD with acute exacerbation 2016    Pneumonia     SOB (shortness of breath)        Past Surgical History:   Procedure Laterality Date    BACK SURGERY       SECTION      TONSILLECTOMY      WISDOM TOOTH EXTRACTION         Review of patient's allergies indicates:   Allergen Reactions    Ciprofloxacin Itching and Rash    Nitrofurantoin monohyd/m-cryst Hives     Penicillins Hives     Patient broke out in hives. Patient also said she had hives in her throat       Family History       Problem Relation (Age of Onset)    Cancer Father    Diabetes Maternal Grandmother          Tobacco Use    Smoking status: Former     Packs/day: 1.00     Years: 30.00     Pack years: 30.00     Types: Cigarettes     Quit date: 1/3/2012     Years since quittin.1    Smokeless tobacco: Not on file   Substance and Sexual Activity    Alcohol use: No    Drug use: No    Sexual activity: Never     Birth control/protection: None         Review of Systems   Constitutional:  Positive for activity change and fatigue. Negative for chills and fever.   Respiratory:  Positive for cough. Negative for choking, shortness of breath and wheezing.    Cardiovascular:  Negative for leg swelling.   All other systems reviewed and are negative.  Objective:     Vital Signs (Most Recent):  Temp: 98.1 °F (36.7 °C) (23 1003)  Pulse: 106 (23 1700)  Resp: 20 (23 1756)  BP: 110/65 (23 1700)  SpO2: 95 % (23 1700) Vital Signs (24h Range):  Temp:  [98.1 °F (36.7 °C)] 98.1 °F (36.7 °C)  Pulse:  [] 106  Resp:  [15-20] 20  SpO2:  [89 %-97 %] 95 %  BP: (104-134)/(65-86) 110/65     Weight: 60 kg (132 lb 4.4 oz)  Body mass index is 22.71 kg/m².      Intake/Output Summary (Last 24 hours) at 2023 1832  Last data filed at 2023 1601  Gross per 24 hour   Intake 1194.05 ml   Output --   Net 1194.05 ml       Physical Exam  Vitals reviewed.   Constitutional:       General: She is awake. She is not in acute distress.     Appearance: She is well-developed.   Cardiovascular:      Rate and Rhythm: Normal rate.      Pulses:           Radial pulses are 2+ on the right side and 2+ on the left side.   Pulmonary:      Effort: Pulmonary effort is normal.      Breath sounds: Decreased breath sounds present.      Comments: On 3L NC  Abdominal:      General: There is no distension.      Palpations:  Abdomen is soft.   Musculoskeletal:      Right lower leg: No edema.      Left lower leg: No edema.   Skin:     General: Skin is warm and dry.   Neurological:      Mental Status: She is alert.   Psychiatric:         Behavior: Behavior is cooperative.       Vents:  Oxygen Concentration (%): 36 (02/20/23 1041)    Lines/Drains/Airways       Peripheral Intravenous Line  Duration                  Peripheral IV - Single Lumen 02/20/23 1100 20 G Anterior;Distal;Left Forearm <1 day         Peripheral IV - Single Lumen 02/20/23 1500 18 G Left Antecubital <1 day                    Significant Labs:    CBC/Anemia Profile:  Recent Labs   Lab 02/20/23  1052   WBC 30.84*   HGB 10.0*   HCT 32.8*      MCV 89   RDW 15.1*        Chemistries:  Recent Labs   Lab 02/20/23  1052   *   K 4.5   CL 87*   CO2 34*   BUN 7*   CREATININE 0.7   CALCIUM 7.7*   ALBUMIN 3.0*   PROT 6.6   BILITOT 0.5   ALKPHOS 149*   ALT 18   AST 61*   MG 1.4*       All pertinent labs within the past 24 hours have been reviewed.    Significant Imaging:   I have reviewed all pertinent imaging results/findings within the past 24 hours.      ABG  Recent Labs   Lab 02/20/23  1430   PH 7.425   PO2 72*   PCO2 60.1*   HCO3 39.5*   BE 15     Assessment/Plan:     Pulmonary  * Pneumonia involving right lung  - imaging reviewed  - previous pseudomonas pneumonia in Nov 2022   - admitted earlier this month for pneumonia and was discharge on levaquin  - pt returned to the ER 2/20 for increased O2 needs per her home ritika team  - on 3L (her baseline) in the ER  - on merrem  - sputum culture   - PT, IS, ambulate     Acute on chronic respiratory failure with hypoxia  Patient with Hypoxic Respiratory failure which is Acute on chronic.  she is on home oxygen at 3 LPM. Supplemental oxygen was provided and noted- Oxygen Concentration (%):  [36] 36.   Signs/symptoms of respiratory failure include- worseing hypoxia on home flow O2. Contributing diagnoses includes - Pneumonia  Labs and images were reviewed. Patient Has not had a recent ABG. Will treat underlying causes and adjust management of respiratory failure as follows    - see plan for pneumonia         Thank you for your consult. I will follow-up with patient. Please contact us if you have any additional questions.        Fly Kahn NP  Pulmonology  [facility] - Emergency Dept.

## 2023-02-22 LAB
ACANTHOCYTES BLD QL SMEAR: PRESENT
ANION GAP SERPL CALC-SCNC: 14 MMOL/L (ref 8–16)
ANISOCYTOSIS BLD QL SMEAR: SLIGHT
BASOPHILS # BLD AUTO: ABNORMAL K/UL (ref 0–0.2)
BASOPHILS NFR BLD: 0 % (ref 0–1.9)
BUN SERPL-MCNC: 8 MG/DL (ref 8–23)
CALCIUM SERPL-MCNC: 7.5 MG/DL (ref 8.7–10.5)
CHLORIDE SERPL-SCNC: 90 MMOL/L (ref 95–110)
CO2 SERPL-SCNC: 32 MMOL/L (ref 23–29)
CREAT SERPL-MCNC: 0.6 MG/DL (ref 0.5–1.4)
DACRYOCYTES BLD QL SMEAR: ABNORMAL
DIFFERENTIAL METHOD: ABNORMAL
EOSINOPHIL # BLD AUTO: ABNORMAL K/UL (ref 0–0.5)
EOSINOPHIL NFR BLD: 8 % (ref 0–8)
ERYTHROCYTE [DISTWIDTH] IN BLOOD BY AUTOMATED COUNT: 15.3 % (ref 11.5–14.5)
EST. GFR  (NO RACE VARIABLE): >60 ML/MIN/1.73 M^2
GLUCOSE SERPL-MCNC: 90 MG/DL (ref 70–110)
HCT VFR BLD AUTO: 26.6 % (ref 37–48.5)
HGB BLD-MCNC: 8.2 G/DL (ref 12–16)
IMM GRANULOCYTES # BLD AUTO: ABNORMAL K/UL (ref 0–0.04)
IMM GRANULOCYTES NFR BLD AUTO: ABNORMAL % (ref 0–0.5)
LYMPHOCYTES # BLD AUTO: ABNORMAL K/UL (ref 1–4.8)
LYMPHOCYTES NFR BLD: 20 % (ref 18–48)
MAGNESIUM SERPL-MCNC: 1.5 MG/DL (ref 1.6–2.6)
MCH RBC QN AUTO: 27.2 PG (ref 27–31)
MCHC RBC AUTO-ENTMCNC: 30.8 G/DL (ref 32–36)
MCV RBC AUTO: 88 FL (ref 82–98)
MONOCYTES # BLD AUTO: ABNORMAL K/UL (ref 0.3–1)
MONOCYTES NFR BLD: 4 % (ref 4–15)
NEUTROPHILS NFR BLD: 68 % (ref 38–73)
NRBC BLD-RTO: 0 /100 WBC
PHOSPHATE SERPL-MCNC: 3.5 MG/DL (ref 2.7–4.5)
PLATELET # BLD AUTO: 303 K/UL (ref 150–450)
PLATELET BLD QL SMEAR: ABNORMAL
PMV BLD AUTO: 9.5 FL (ref 9.2–12.9)
POIKILOCYTOSIS BLD QL SMEAR: SLIGHT
POTASSIUM SERPL-SCNC: 4.4 MMOL/L (ref 3.5–5.1)
RBC # BLD AUTO: 3.02 M/UL (ref 4–5.4)
SODIUM SERPL-SCNC: 136 MMOL/L (ref 136–145)
WBC # BLD AUTO: 15.12 K/UL (ref 3.9–12.7)

## 2023-02-22 PROCEDURE — 94640 AIRWAY INHALATION TREATMENT: CPT

## 2023-02-22 PROCEDURE — 99900035 HC TECH TIME PER 15 MIN (STAT)

## 2023-02-22 PROCEDURE — 94761 N-INVAS EAR/PLS OXIMETRY MLT: CPT

## 2023-02-22 PROCEDURE — 94799 UNLISTED PULMONARY SVC/PX: CPT

## 2023-02-22 PROCEDURE — 25000242 PHARM REV CODE 250 ALT 637 W/ HCPCS: Performed by: NURSE PRACTITIONER

## 2023-02-22 PROCEDURE — 21400001 HC TELEMETRY ROOM

## 2023-02-22 PROCEDURE — 97110 THERAPEUTIC EXERCISES: CPT

## 2023-02-22 PROCEDURE — 25000003 PHARM REV CODE 250: Performed by: NURSE PRACTITIONER

## 2023-02-22 PROCEDURE — 85027 COMPLETE CBC AUTOMATED: CPT | Performed by: INTERNAL MEDICINE

## 2023-02-22 PROCEDURE — 27000221 HC OXYGEN, UP TO 24 HOURS

## 2023-02-22 PROCEDURE — 84100 ASSAY OF PHOSPHORUS: CPT | Performed by: INTERNAL MEDICINE

## 2023-02-22 PROCEDURE — 25000003 PHARM REV CODE 250: Performed by: INTERNAL MEDICINE

## 2023-02-22 PROCEDURE — 83735 ASSAY OF MAGNESIUM: CPT | Performed by: INTERNAL MEDICINE

## 2023-02-22 PROCEDURE — 63600175 PHARM REV CODE 636 W HCPCS: Mod: TB,JG | Performed by: INTERNAL MEDICINE

## 2023-02-22 PROCEDURE — 85007 BL SMEAR W/DIFF WBC COUNT: CPT | Performed by: INTERNAL MEDICINE

## 2023-02-22 PROCEDURE — 80048 BASIC METABOLIC PNL TOTAL CA: CPT | Performed by: INTERNAL MEDICINE

## 2023-02-22 PROCEDURE — 36415 COLL VENOUS BLD VENIPUNCTURE: CPT | Performed by: INTERNAL MEDICINE

## 2023-02-22 PROCEDURE — 25000242 PHARM REV CODE 250 ALT 637 W/ HCPCS: Performed by: INTERNAL MEDICINE

## 2023-02-22 RX ORDER — ONDANSETRON 8 MG/1
8 TABLET, ORALLY DISINTEGRATING ORAL EVERY 8 HOURS PRN
Status: DISCONTINUED | OUTPATIENT
Start: 2023-02-22 | End: 2023-02-28 | Stop reason: HOSPADM

## 2023-02-22 RX ORDER — POLYETHYLENE GLYCOL 3350 17 G/17G
17 POWDER, FOR SOLUTION ORAL DAILY
Status: DISCONTINUED | OUTPATIENT
Start: 2023-02-22 | End: 2023-02-28 | Stop reason: HOSPADM

## 2023-02-22 RX ORDER — ONDANSETRON 8 MG/1
8 TABLET, ORALLY DISINTEGRATING ORAL EVERY 8 HOURS PRN
Status: DISCONTINUED | OUTPATIENT
Start: 2023-02-22 | End: 2023-02-22

## 2023-02-22 RX ORDER — AMOXICILLIN 250 MG
1 CAPSULE ORAL DAILY
Status: DISCONTINUED | OUTPATIENT
Start: 2023-02-22 | End: 2023-02-28 | Stop reason: HOSPADM

## 2023-02-22 RX ADMIN — IPRATROPIUM BROMIDE 0.5 MG: 0.5 SOLUTION RESPIRATORY (INHALATION) at 12:02

## 2023-02-22 RX ADMIN — TIZANIDINE 4 MG: 4 TABLET ORAL at 12:02

## 2023-02-22 RX ADMIN — ENOXAPARIN SODIUM 40 MG: 40 INJECTION SUBCUTANEOUS at 05:02

## 2023-02-22 RX ADMIN — DILTIAZEM HYDROCHLORIDE 120 MG: 120 CAPSULE, COATED, EXTENDED RELEASE ORAL at 09:02

## 2023-02-22 RX ADMIN — ONDANSETRON 8 MG: 8 TABLET, ORALLY DISINTEGRATING ORAL at 10:02

## 2023-02-22 RX ADMIN — DOXYCYCLINE HYCLATE 100 MG: 100 TABLET, COATED ORAL at 09:02

## 2023-02-22 RX ADMIN — ARFORMOTEROL TARTRATE 15 MCG: 15 SOLUTION RESPIRATORY (INHALATION) at 07:02

## 2023-02-22 RX ADMIN — MEROPENEM AND SODIUM CHLORIDE 500 MG: 500 INJECTION, SOLUTION INTRAVENOUS at 07:02

## 2023-02-22 RX ADMIN — MEROPENEM AND SODIUM CHLORIDE 500 MG: 500 INJECTION, SOLUTION INTRAVENOUS at 11:02

## 2023-02-22 RX ADMIN — SODIUM CHLORIDE 30 MG/ML INHALATION SOLUTION 4 ML: 30 SOLUTION INHALANT at 07:02

## 2023-02-22 RX ADMIN — GABAPENTIN 400 MG: 400 CAPSULE ORAL at 04:02

## 2023-02-22 RX ADMIN — OXYCODONE AND ACETAMINOPHEN 1 TABLET: 7.5; 325 TABLET ORAL at 04:02

## 2023-02-22 RX ADMIN — TIZANIDINE 4 MG: 4 TABLET ORAL at 09:02

## 2023-02-22 RX ADMIN — MONTELUKAST 10 MG: 10 TABLET, FILM COATED ORAL at 09:02

## 2023-02-22 RX ADMIN — BUSPIRONE HYDROCHLORIDE 7.5 MG: 5 TABLET ORAL at 09:02

## 2023-02-22 RX ADMIN — IPRATROPIUM BROMIDE 0.5 MG: 0.5 SOLUTION RESPIRATORY (INHALATION) at 07:02

## 2023-02-22 RX ADMIN — GABAPENTIN 400 MG: 400 CAPSULE ORAL at 09:02

## 2023-02-22 RX ADMIN — OXYCODONE AND ACETAMINOPHEN 1 TABLET: 7.5; 325 TABLET ORAL at 10:02

## 2023-02-22 RX ADMIN — SODIUM CHLORIDE 30 MG/ML INHALATION SOLUTION 4 ML: 30 SOLUTION INHALANT at 12:02

## 2023-02-22 RX ADMIN — LEVOTHYROXINE SODIUM 125 MCG: 125 TABLET ORAL at 04:02

## 2023-02-22 RX ADMIN — GABAPENTIN 400 MG: 400 CAPSULE ORAL at 01:02

## 2023-02-22 RX ADMIN — ONDANSETRON 8 MG: 8 TABLET, ORALLY DISINTEGRATING ORAL at 02:02

## 2023-02-22 RX ADMIN — SENNOSIDES AND DOCUSATE SODIUM 1 TABLET: 50; 8.6 TABLET ORAL at 07:02

## 2023-02-22 RX ADMIN — TIZANIDINE 4 MG: 4 TABLET ORAL at 04:02

## 2023-02-22 RX ADMIN — MEROPENEM AND SODIUM CHLORIDE 500 MG: 500 INJECTION, SOLUTION INTRAVENOUS at 12:02

## 2023-02-22 RX ADMIN — MEROPENEM AND SODIUM CHLORIDE 500 MG: 500 INJECTION, SOLUTION INTRAVENOUS at 04:02

## 2023-02-22 RX ADMIN — CALCITRIOL CAPSULES 0.25 MCG 0.25 MCG: 0.25 CAPSULE ORAL at 09:02

## 2023-02-22 RX ADMIN — BUDESONIDE 0.5 MG: 0.5 INHALANT ORAL at 07:02

## 2023-02-22 NOTE — SUBJECTIVE & OBJECTIVE
Interval History: Oxygen support at baseline: 3L, unable to produce sputum for culture. Will continue current regimen at this time, Ordered PT/OT, possible D/C in AM.     Review of Systems   Constitutional:  Positive for activity change and fatigue. Negative for appetite change, chills, diaphoresis, fever and unexpected weight change.   HENT:  Negative for congestion, hearing loss, mouth sores, postnasal drip, rhinorrhea, sore throat and trouble swallowing.    Eyes:  Negative for discharge and visual disturbance.   Respiratory:  Positive for cough and shortness of breath. Negative for chest tightness.    Cardiovascular:  Negative for chest pain, palpitations and leg swelling.   Gastrointestinal:  Negative for blood in stool, constipation, diarrhea, nausea and vomiting.   Endocrine: Negative for cold intolerance and heat intolerance.   Genitourinary:  Negative for difficulty urinating, dyspareunia, flank pain and hematuria.   Musculoskeletal:  Negative for arthralgias, back pain and myalgias.   Skin: Negative.    Neurological:  Positive for weakness. Negative for dizziness, light-headedness and headaches.   Hematological:  Negative for adenopathy. Does not bruise/bleed easily.   Psychiatric/Behavioral:  Negative for agitation, behavioral problems and confusion. The patient is nervous/anxious.    Objective:     Vital Signs (Most Recent):  Temp: 97.5 °F (36.4 °C) (02/22/23 1133)  Pulse: 74 (02/22/23 1227)  Resp: (!) 22 (02/22/23 1227)  BP: (!) 91/53 (02/22/23 1133)  SpO2: 95 % (02/22/23 1227)   Vital Signs (24h Range):  Temp:  [97.5 °F (36.4 °C)-98.2 °F (36.8 °C)] 97.5 °F (36.4 °C)  Pulse:  [] 74  Resp:  [16-22] 22  SpO2:  [50 %-100 %] 95 %  BP: ()/(53-77) 91/53     Weight: 61.5 kg (135 lb 9.3 oz)  Body mass index is 22.56 kg/m².    Intake/Output Summary (Last 24 hours) at 2/22/2023 1418  Last data filed at 2/22/2023 1235  Gross per 24 hour   Intake 869 ml   Output 2100 ml   Net -1231 ml      Physical  Exam  Vitals and nursing note reviewed.   Constitutional:       General: She is not in acute distress.     Appearance: She is well-developed. She is ill-appearing.   HENT:      Head: Normocephalic and atraumatic.      Right Ear: Hearing and external ear normal.      Left Ear: Hearing and external ear normal.      Nose: No rhinorrhea.      Right Sinus: No maxillary sinus tenderness or frontal sinus tenderness.      Left Sinus: No maxillary sinus tenderness or frontal sinus tenderness.      Mouth/Throat:      Mouth: No oral lesions.      Pharynx: Uvula midline.   Eyes:      General:         Right eye: No discharge.         Left eye: No discharge.      Conjunctiva/sclera: Conjunctivae normal.      Pupils: Pupils are equal, round, and reactive to light.   Neck:      Thyroid: No thyromegaly.      Vascular: No carotid bruit.      Trachea: No tracheal deviation.   Cardiovascular:      Rate and Rhythm: Normal rate and regular rhythm.      Pulses:           Dorsalis pedis pulses are 2+ on the right side and 2+ on the left side.      Heart sounds: Normal heart sounds, S1 normal and S2 normal. No murmur heard.  Pulmonary:      Effort: Pulmonary effort is normal. No respiratory distress.      Breath sounds: Normal breath sounds.   Abdominal:      General: Bowel sounds are decreased. There is distension.      Palpations: Abdomen is soft. There is no mass.      Tenderness: There is generalized abdominal tenderness.   Musculoskeletal:         General: Normal range of motion.      Cervical back: Normal range of motion.   Lymphadenopathy:      Cervical: No cervical adenopathy.      Upper Body:      Right upper body: No supraclavicular adenopathy.      Left upper body: No supraclavicular adenopathy.   Skin:     General: Skin is warm and dry.      Capillary Refill: Capillary refill takes less than 2 seconds.      Findings: No rash.   Neurological:      Mental Status: She is alert and oriented to person, place, and time.      Sensory:  No sensory deficit.      Coordination: Coordination normal.      Gait: Gait normal.   Psychiatric:         Mood and Affect: Mood is not anxious or depressed.         Speech: Speech normal.         Behavior: Behavior normal.         Thought Content: Thought content normal.         Judgment: Judgment normal.       Significant Labs: All pertinent labs within the past 24 hours have been reviewed.  CBC:   Recent Labs   Lab 02/21/23  0537 02/22/23  0632   WBC 15.61* 15.12*   HGB 8.1* 8.2*   HCT 26.2* 26.6*    303     CMP:   Recent Labs   Lab 02/21/23  0537 02/22/23  0632   * 136   K 3.8 4.4   CL 87* 90*   CO2 36* 32*   GLU 92 90   BUN 7* 8   CREATININE 0.7 0.6   CALCIUM 7.0* 7.5*   ANIONGAP 12 14       Significant Imaging: I have reviewed all pertinent imaging results/findings within the past 24 hours.

## 2023-02-22 NOTE — ASSESSMENT & PLAN NOTE
Patient with Hypercapnic and Hypoxic Respiratory failure which is Acute on chronic.  she is on home oxygen at 3 LPM. Supplemental oxygen was provided and noted- Oxygen Concentration (%):  [32] 32.   Signs/symptoms of respiratory failure include- tachypnea, increased work of breathing and lethargy. Contributing diagnoses includes - COPD, Pleural effusion and Pneumonia Labs and images were reviewed. Patient Has recent ABG, which has been reviewed. Will treat underlying causes and adjust management of respiratory failure as follows- IV antibiotics, supplemental oxygen, prn breathing treatments

## 2023-02-22 NOTE — ASSESSMENT & PLAN NOTE
- previous pseudomonas pneumonia in Nov 2022, admitted earlier this month for pneumonia and was discharge on levaquin, pt returned to the ER 2/20 for increased O2 needs per her home ritika team   - remains on 3L NC which is her baseline  - continue merrem  - sputum culture pending if able to produce sample, add 3% nebs to help loosen sputum   - PT, IS, ambulate, OOB

## 2023-02-22 NOTE — PROGRESS NOTES
[facility]  Pulmonology Progress Note    Patient Name: Christine Horner  MRN: [id number]  Admission Date: 2/20/2023  Hospital Length of Stay: 2 days  Code Status: Full Code  Attending Provider: Ramon Tenorio, *  Primary Care Provider: Eloy Hdez MD   Principal Problem: Pneumonia involving right lung    Subjective:     63 year old female with a known past medical history of anemia, arthritis, COPD, chronic respiratory failure on home oxygen at 3L NC, pneumonia, and chronic back pain who presented to the ED 2/20 with increased oxygen requirements. Of note, she was discharged from the hospital last week on Levaquin for pneumonia. Home health went to see patient today and found her oxygen saturation to be 82% on 3L.  Her flow was increased to 5L with slow improvement to low 90s. She was instructed to come to the ED for further evaluation. She reports she was tired and weak over the weekend. Workup in the ED with increasing density in right lung on CT chest. She reports cough is becoming productive. The sputum is yellow greenish color. Previously cough was not productive. Hospital medicine consulted for admission. Discussed CT findings with patient. Pulmonary was consulted for the above.    At the time of my exam in the ER, the pt is awake and alert on 3L NC. She is able to speak in full sentences and is without any specific compliants. No family at bedside.    Interval History:  2/21: did not sleep last night as she boarded in the ER, remains on 3L NC, with cough that is nonproductive   2/22: slept well overnight, on home flow 3L, with some cough but not much sputum production    ROS complete and negative unless stated in the interval HPI    Objective:     Vital Signs (Most Recent):  Temp: 98.2 °F (36.8 °C) (02/22/23 0717)  Pulse: (!) 134 (02/22/23 0717)  Resp: 19 (02/22/23 0717)  BP: (!) 92/54 (02/22/23 0717)  SpO2: 100 % (02/22/23 0717)   Vital Signs (24h Range):  Temp:  [97.6 °F (36.4 °C)-98.6 °F  (37 °C)] 98.2 °F (36.8 °C)  Pulse:  [] 134  Resp:  [16-20] 19  SpO2:  [50 %-100 %] 100 %  BP: ()/(53-77) 92/54     Weight: 61.5 kg (135 lb 9.3 oz)  Body mass index is 22.56 kg/m².      Intake/Output Summary (Last 24 hours) at 2/22/2023 0925  Last data filed at 2/22/2023 0846  Gross per 24 hour   Intake 389 ml   Output 1700 ml   Net -1311 ml       Physical Exam  Vitals reviewed.   Constitutional:       General: She is awake. She is not in acute distress.     Appearance: She is well-developed.   Cardiovascular:      Rate and Rhythm: Normal rate.      Pulses:           Radial pulses are 2+ on the right side and 2+ on the left side.   Pulmonary:      Effort: Pulmonary effort is normal.      Breath sounds: Normal breath sounds.      Comments: On home flow of 3L NC  Abdominal:      General: There is no distension.      Palpations: Abdomen is soft.   Musculoskeletal:      Right lower leg: No edema.      Left lower leg: No edema.   Skin:     General: Skin is warm and dry.   Neurological:      General: No focal deficit present.      Mental Status: She is alert.   Psychiatric:         Behavior: Behavior is cooperative.         Vents:  Oxygen Concentration (%): 32 (02/21/23 1905)    Lines/Drains/Airways       Peripheral Intravenous Line  Duration                  Peripheral IV - Single Lumen 02/21/23 1230 18 G Right Antecubital <1 day                    Significant Labs:    CBC/Anemia Profile:  Recent Labs   Lab 02/20/23  1052 02/21/23  0537 02/22/23  0632   WBC 30.84* 15.61* 15.12*   HGB 10.0* 8.1* 8.2*   HCT 32.8* 26.2* 26.6*    302 303   MCV 89 88 88   RDW 15.1* 15.3* 15.3*        Chemistries:  Recent Labs   Lab 02/20/23  1052 02/21/23  0537   * 135*   K 4.5 3.8   CL 87* 87*   CO2 34* 36*   BUN 7* 7*   CREATININE 0.7 0.7   CALCIUM 7.7* 7.0*   ALBUMIN 3.0*  --    PROT 6.6  --    BILITOT 0.5  --    ALKPHOS 149*  --    ALT 18  --    AST 61*  --    MG 1.4* 1.6   PHOS  --  3.6       All pertinent labs  within the past 24 hours have been reviewed.    Significant Imaging:  I have reviewed all pertinent imaging results/findings within the past 24 hours.      ABG  Recent Labs   Lab 02/20/23  1430   PH 7.425   PO2 72*   PCO2 60.1*   HCO3 39.5*   BE 15     Assessment/Plan:     Pulmonary  * Pneumonia involving right lung  - previous pseudomonas pneumonia in Nov 2022, admitted earlier this month for pneumonia and was discharge on levaquin, pt returned to the ER 2/20 for increased O2 needs per her home ritika team   - remains on 3L NC which is her baseline  - continue merrem  - sputum culture pending if able to produce sample, add 3% nebs to help loosen sputum   - PT, IS, ambulate, OOB    Acute on chronic respiratory failure with hypoxia  Patient with Hypoxic Respiratory failure which is Acute on chronic.  she is on home oxygen at 3 LPM. Supplemental oxygen was provided and noted- Oxygen Concentration (%):  [32-36] 32.   Signs/symptoms of respiratory failure include- worseing hypoxia on home flow O2. Contributing diagnoses includes - Pneumonia Labs and images were reviewed. Patient Has not had a recent ABG. Will treat underlying causes and adjust management of respiratory failure as follows    - see plan for pneumonia       Pulmonary team will remain available. Please call if we can be of assistance sooner or if questions should arise.       Fly Kahn NP  Pulmonology  [facility] - [facility] S[facility]

## 2023-02-22 NOTE — ASSESSMENT & PLAN NOTE
Patient with Hypoxic Respiratory failure which is Acute on chronic.  she is on home oxygen at 3 LPM. Supplemental oxygen was provided and noted- Oxygen Concentration (%):  [32-36] 32.   Signs/symptoms of respiratory failure include- worseing hypoxia on home flow O2. Contributing diagnoses includes - Pneumonia Labs and images were reviewed. Patient Has not had a recent ABG. Will treat underlying causes and adjust management of respiratory failure as follows    - see plan for pneumonia

## 2023-02-22 NOTE — PLAN OF CARE
TX COMPLETED: unwilling to participate in EOB/OOB activity due to pain and distention in abdomen. Provided education and therex. Cont POC.

## 2023-02-22 NOTE — PROGRESS NOTES
[facility] Medicine  Progress Note    Patient Name: Christine Horner  MRN: [id number]  Patient Class: IP- Inpatient   Admission Date: 2/20/2023  Length of Stay: 2 days  Attending Physician: Ramon Tenorio, *  Primary Care Provider: Eloy Hdez MD        Subjective:     Principal Problem:Pneumonia involving right lung        HPI:  The patient is a 62 yo female with past medical history of anemia, arthritis, COPD, chronic respiratory failure on home oxygen, pneumonia and chronic back pain who presented to the ED with increased oxygen requirements. She is usually on 3L nasal cannula. She was discharged from the hospital last week on Levaquin for pneumonia. Home health went to see patient today and found her oxygen saturation to be 82% on 3L.  Her flow was increased to 5L with slow improvement to low 90s. She was instructed to come to the ED for further evaluation. She reports she was tired and weak over the weekend. Workup in the ED with increasing density in right lung on CT chest. She reports cough is becoming productive. The sputum is yellow greenish color. Previously cough was not productive.Hospital medicine consulted for admission. Discussed CT findings with patient.       Overview/Hospital Course:  63 year old female, under the management of Hospital Medicine for pneumonia, acute on chronic respiratory failure. Patient O2 requirements decreased overnight, currently at baseline with 3L NC, SpO2 stable: 95%. Currently treated with Doxycycline and Merrem due to Hx of respiratory pseudomonas (11/22). Sputum culture ordered, patient has not been able to produce sputum for culture, has been given 3% saline nebs to assist without success. BC: NGTD. PT/OT ordered, possible d/c in AM.       Interval History: Oxygen support at baseline: 3L, unable to produce sputum for culture. Will continue current regimen at this time, Ordered PT/OT, possible D/C in AM.     Review of Systems    Constitutional:  Positive for activity change and fatigue. Negative for appetite change, chills, diaphoresis, fever and unexpected weight change.   HENT:  Negative for congestion, hearing loss, mouth sores, postnasal drip, rhinorrhea, sore throat and trouble swallowing.    Eyes:  Negative for discharge and visual disturbance.   Respiratory:  Positive for cough and shortness of breath. Negative for chest tightness.    Cardiovascular:  Negative for chest pain, palpitations and leg swelling.   Gastrointestinal:  Negative for blood in stool, constipation, diarrhea, nausea and vomiting.   Endocrine: Negative for cold intolerance and heat intolerance.   Genitourinary:  Negative for difficulty urinating, dyspareunia, flank pain and hematuria.   Musculoskeletal:  Negative for arthralgias, back pain and myalgias.   Skin: Negative.    Neurological:  Positive for weakness. Negative for dizziness, light-headedness and headaches.   Hematological:  Negative for adenopathy. Does not bruise/bleed easily.   Psychiatric/Behavioral:  Negative for agitation, behavioral problems and confusion. The patient is nervous/anxious.    Objective:     Vital Signs (Most Recent):  Temp: 97.5 °F (36.4 °C) (02/22/23 1133)  Pulse: 74 (02/22/23 1227)  Resp: (!) 22 (02/22/23 1227)  BP: (!) 91/53 (02/22/23 1133)  SpO2: 95 % (02/22/23 1227)   Vital Signs (24h Range):  Temp:  [97.5 °F (36.4 °C)-98.2 °F (36.8 °C)] 97.5 °F (36.4 °C)  Pulse:  [] 74  Resp:  [16-22] 22  SpO2:  [50 %-100 %] 95 %  BP: ()/(53-77) 91/53     Weight: 61.5 kg (135 lb 9.3 oz)  Body mass index is 22.56 kg/m².    Intake/Output Summary (Last 24 hours) at 2/22/2023 1418  Last data filed at 2/22/2023 1235  Gross per 24 hour   Intake 869 ml   Output 2100 ml   Net -1231 ml      Physical Exam  Vitals and nursing note reviewed.   Constitutional:       General: She is not in acute distress.     Appearance: She is well-developed. She is ill-appearing.   HENT:      Head:  Normocephalic and atraumatic.      Right Ear: Hearing and external ear normal.      Left Ear: Hearing and external ear normal.      Nose: No rhinorrhea.      Right Sinus: No maxillary sinus tenderness or frontal sinus tenderness.      Left Sinus: No maxillary sinus tenderness or frontal sinus tenderness.      Mouth/Throat:      Mouth: No oral lesions.      Pharynx: Uvula midline.   Eyes:      General:         Right eye: No discharge.         Left eye: No discharge.      Conjunctiva/sclera: Conjunctivae normal.      Pupils: Pupils are equal, round, and reactive to light.   Neck:      Thyroid: No thyromegaly.      Vascular: No carotid bruit.      Trachea: No tracheal deviation.   Cardiovascular:      Rate and Rhythm: Normal rate and regular rhythm.      Pulses:           Dorsalis pedis pulses are 2+ on the right side and 2+ on the left side.      Heart sounds: Normal heart sounds, S1 normal and S2 normal. No murmur heard.  Pulmonary:      Effort: Pulmonary effort is normal. No respiratory distress.      Breath sounds: Normal breath sounds.   Abdominal:      General: Bowel sounds are decreased. There is distension.      Palpations: Abdomen is soft. There is no mass.      Tenderness: There is generalized abdominal tenderness.   Musculoskeletal:         General: Normal range of motion.      Cervical back: Normal range of motion.   Lymphadenopathy:      Cervical: No cervical adenopathy.      Upper Body:      Right upper body: No supraclavicular adenopathy.      Left upper body: No supraclavicular adenopathy.   Skin:     General: Skin is warm and dry.      Capillary Refill: Capillary refill takes less than 2 seconds.      Findings: No rash.   Neurological:      Mental Status: She is alert and oriented to person, place, and time.      Sensory: No sensory deficit.      Coordination: Coordination normal.      Gait: Gait normal.   Psychiatric:         Mood and Affect: Mood is not anxious or depressed.         Speech: Speech  normal.         Behavior: Behavior normal.         Thought Content: Thought content normal.         Judgment: Judgment normal.       Significant Labs: All pertinent labs within the past 24 hours have been reviewed.  CBC:   Recent Labs   Lab 02/21/23  0537 02/22/23  0632   WBC 15.61* 15.12*   HGB 8.1* 8.2*   HCT 26.2* 26.6*    303     CMP:   Recent Labs   Lab 02/21/23  0537 02/22/23  0632   * 136   K 3.8 4.4   CL 87* 90*   CO2 36* 32*   GLU 92 90   BUN 7* 8   CREATININE 0.7 0.6   CALCIUM 7.0* 7.5*   ANIONGAP 12 14       Significant Imaging: I have reviewed all pertinent imaging results/findings within the past 24 hours.      Assessment/Plan:      * Pneumonia involving right lung  Previously treated for Pseudomonal pneumonia  Continue meropenem and doxycycline  f/u sputum culture- unable to collect  Appreciate pulmonology    CLL (chronic lymphocytic leukemia)  Followed by outside heme-onc        Postablative hypothyroidism  Continue levothyroxine      Paroxysmal SVT (supraventricular tachycardia)  Continue home medications  Heart rate currently controlled      Acute on chronic respiratory failure with hypoxia  Patient with Hypercapnic and Hypoxic Respiratory failure which is Acute on chronic.  she is on home oxygen at 3 LPM. Supplemental oxygen was provided and noted- Oxygen Concentration (%):  [32] 32.   Signs/symptoms of respiratory failure include- tachypnea, increased work of breathing and lethargy. Contributing diagnoses includes - COPD, Pleural effusion and Pneumonia Labs and images were reviewed. Patient Has recent ABG, which has been reviewed. Will treat underlying causes and adjust management of respiratory failure as follows- IV antibiotics, supplemental oxygen, prn breathing treatments      VTE Risk Mitigation (From admission, onward)         Ordered     enoxaparin injection 40 mg  Daily         02/20/23 1656     IP VTE HIGH RISK PATIENT  Once         02/20/23 1656     Place sequential  compression device  Until discontinued         02/20/23 1656                Discharge Planning   MARK:      Code Status: Full Code   Is the patient medically ready for discharge?:     Reason for patient still in hospital (select all that apply): Patient trending condition  Discharge Plan A: Home Health   Discharge Delays: None known at this time              Kinsey Huerta NP  Department of Hospital Medicine   [facility] - [facility] S[facility]

## 2023-02-22 NOTE — PLAN OF CARE
02/22/23 1420   Post-Acute Status   Post-Acute Authorization Home Health   Home Health Status Set-up Complete/Auth obtained   Discharge Delays None known at this time   Discharge Plan   Discharge Plan A Home Health     Sw sent referral to [facility] to resume services upon d/c.

## 2023-02-22 NOTE — HOSPITAL COURSE
63 year old female, under the management of Hospital Medicine for pneumonia, acute on chronic respiratory failure. Patient O2 requirements decreased overnight, currently at baseline with 3L NC, SpO2 stable: 95%. Currently treated with Doxycycline and Merrem due to Hx of respiratory pseudomonas (11/22). Sputum culture ordered, patient has not been able to produce sputum for culture, has been given 3% saline nebs to assist without success. BC: NGTD. PT/OT ordered. Patient with significant abdominal distension, KUB non-contributory. Abdominal US: concerning for liver masses. Ordered triple-phase CT of Liver: Multiple large hypoenhancing masses in the liver, largest on the right measuring 12 x 10 cm, largest on the left measuring 2.2 x 1.9 cm, compatible with metastatic disease. Ordered AFP, consulted IR for possible Biopsy, consulted Hem/Onc. Discussed with patient, has Hx of Thyroid Cancer (2020), treated with thyroidectomy 1/2020, I-131 11/2020. Ordered Thyroglobulin Ab: elevated, AFP: normal. On 2/24/23, IR consulted for biopsy, plan for Monday, NPO after midnight.  Hematology-Oncology consult-awaiting tissue confirmation.  PT/OT evaluations completed with home health recommended.  Social work consulted to establish home health prior to discharge.    2/27 awaiting ir biopsy of liver mass. Anticipate discharge tomorrow with close follow up outpatient with primary oncologist.    2/18  Tolerated liver biopsy. Awaiting pathology. Outpatient appointment with primary oncologist scheduled prior to discharge. Will need pathology of liver mass prior to proceeding with lung mass.    Patient on baseline supplemental oxygen.     Patient seen and evaluated by me. Patient was determined to be suitable for d/c. Patient deemed stable for discharge to home with homehealth physical/occupational therapy and nurse practitioner to visit home program.

## 2023-02-22 NOTE — ASSESSMENT & PLAN NOTE
Previously treated for Pseudomonal pneumonia  Continue meropenem and doxycycline  f/u sputum culture- unable to collect  Appreciate pulmonology

## 2023-02-22 NOTE — SUBJECTIVE & OBJECTIVE
63 year old female with a known past medical history of anemia, arthritis, COPD, chronic respiratory failure on home oxygen at 3L NC, pneumonia, and chronic back pain who presented to the ED 2/20 with increased oxygen requirements. Of note, she was discharged from the hospital last week on Levaquin for pneumonia. Home health went to see patient today and found her oxygen saturation to be 82% on 3L.  Her flow was increased to 5L with slow improvement to low 90s. She was instructed to come to the ED for further evaluation. She reports she was tired and weak over the weekend. Workup in the ED with increasing density in right lung on CT chest. She reports cough is becoming productive. The sputum is yellow greenish color. Previously cough was not productive. Hospital medicine consulted for admission. Discussed CT findings with patient. Pulmonary was consulted for the above.    At the time of my exam in the ER, the pt is awake and alert on 3L NC. She is able to speak in full sentences and is without any specific compliants. No family at bedside.    Interval History:  2/21: did not sleep last night as she boarded in the ER, remains on 3L NC, with cough that is nonproductive   2/22: slept well overnight, on home flow 3L, with some cough but not much sputum production    ROS complete and negative unless stated in the interval HPI    Objective:     Vital Signs (Most Recent):  Temp: 98.2 °F (36.8 °C) (02/22/23 0717)  Pulse: (!) 134 (02/22/23 0717)  Resp: 19 (02/22/23 0717)  BP: (!) 92/54 (02/22/23 0717)  SpO2: 100 % (02/22/23 0717)   Vital Signs (24h Range):  Temp:  [97.6 °F (36.4 °C)-98.6 °F (37 °C)] 98.2 °F (36.8 °C)  Pulse:  [] 134  Resp:  [16-20] 19  SpO2:  [50 %-100 %] 100 %  BP: ()/(53-77) 92/54     Weight: 61.5 kg (135 lb 9.3 oz)  Body mass index is 22.56 kg/m².      Intake/Output Summary (Last 24 hours) at 2/22/2023 0925  Last data filed at 2/22/2023 0846  Gross per 24 hour   Intake 389 ml   Output 1700 ml    Net -1311 ml       Physical Exam  Vitals reviewed.   Constitutional:       General: She is awake. She is not in acute distress.     Appearance: She is well-developed.   Cardiovascular:      Rate and Rhythm: Normal rate.      Pulses:           Radial pulses are 2+ on the right side and 2+ on the left side.   Pulmonary:      Effort: Pulmonary effort is normal.      Breath sounds: Normal breath sounds.      Comments: On home flow of 3L NC  Abdominal:      General: There is no distension.      Palpations: Abdomen is soft.   Musculoskeletal:      Right lower leg: No edema.      Left lower leg: No edema.   Skin:     General: Skin is warm and dry.   Neurological:      General: No focal deficit present.      Mental Status: She is alert.   Psychiatric:         Behavior: Behavior is cooperative.         Vents:  Oxygen Concentration (%): 32 (02/21/23 1905)    Lines/Drains/Airways       Peripheral Intravenous Line  Duration                  Peripheral IV - Single Lumen 02/21/23 1230 18 G Right Antecubital <1 day                    Significant Labs:    CBC/Anemia Profile:  Recent Labs   Lab 02/20/23  1052 02/21/23  0537 02/22/23  0632   WBC 30.84* 15.61* 15.12*   HGB 10.0* 8.1* 8.2*   HCT 32.8* 26.2* 26.6*    302 303   MCV 89 88 88   RDW 15.1* 15.3* 15.3*        Chemistries:  Recent Labs   Lab 02/20/23  1052 02/21/23  0537   * 135*   K 4.5 3.8   CL 87* 87*   CO2 34* 36*   BUN 7* 7*   CREATININE 0.7 0.7   CALCIUM 7.7* 7.0*   ALBUMIN 3.0*  --    PROT 6.6  --    BILITOT 0.5  --    ALKPHOS 149*  --    ALT 18  --    AST 61*  --    MG 1.4* 1.6   PHOS  --  3.6       All pertinent labs within the past 24 hours have been reviewed.    Significant Imaging:  I have reviewed all pertinent imaging results/findings within the past 24 hours.

## 2023-02-22 NOTE — PT/OT/SLP PROGRESS
Physical Therapy  Treatment    hCristine Horner   MRN: [id number]   Admitting Diagnosis: Pneumonia involving right lung       PT Start Time: 1019     PT Stop Time: 1030    PT Total Time (min): 11 min       Billable Minutes:  Therapeutic Exercise 11    Treatment Type: Treatment  PT/PTA: PT     PTA Visit Number: 0       General Precautions: Standard, fall  Orthopedic Precautions: N/A  Braces: N/A  Respiratory Status: Room air    Spiritual, Cultural Beliefs, Orthodoxy Practices, Values that Affect Care: no    Subjective:  Communicated with nursing and performed chart review via epic prior to session.  Pt not agreeable to EOB/OOB services today but agreeable to supine therex.    Pain/Comfort  Pain Rating 1: 8/10 (pain to back and complaints of discomfort due to abdominal distention)  Pain Addressed 1: Reposition, Distraction, Nurse notified    Objective:   Patient found with: peripheral IV, telemetry, oxygen         Treatment and Education:  Educated pt on benefits of consistent participation in PT services to meet functional goals. Educated pt on supine therex to promote strength and joint mobility. Exercises included AP, hip abd/add, QS, GS x 10-15 reps. Educated to perform exercises intermittently throughout day to tolerance. Educated pt on importance of sitting OOB to promote endurance and overall activity tolerance.  Educated pt on call don't fall policy and use of call button to alert nursing staff of needs.   Pt expressed understanding.    AM-PAC 6 CLICK MOBILITY  How much help from another person does this patient currently need?   1 = Unable, Total/Dependent Assistance  2 = A lot, Maximum/Moderate Assistance  3 = A little, Minimum/Contact Guard/Supervision  4 = None, Modified [place]/Independent    Turning over in bed (including adjusting bedclothes, sheets and blankets)?: 1  Sitting down on and standing up from a chair with arms (e.g., wheelchair, bedside commode, etc.): 1  Moving from lying on back to  sitting on the side of the bed?: 1  Moving to and from a bed to a chair (including a wheelchair)?: 1  Need to walk in hospital room?: 1  Climbing 3-5 steps with a railing?: 1  Basic Mobility Total Score: 6    AM-PAC Raw Score CMS G-Code Modifier Level of Impairment Assistance   6 % Total / Unable   7 - 9 CM 80 - 100% Maximal Assist   10 - 14 CL 60 - 80% Moderate Assist   15 - 19 CK 40 - 60% Moderate Assist   20 - 22 CJ 20 - 40% Minimal Assist   23 CI 1-20% SBA / CGA   24 CH 0% Independent/ Mod I     Patient left supine with all lines intact, call button in reach, and nursing notified.    Assessment:  Christine Horner is a 63 y.o. female with a medical diagnosis of Pneumonia involving right lung and presents with the impairments listed below. Pt with limited participation today but receptive to supine therex. Pt will benefit from continued PT services to promote overall mobility.    Rehab identified problem list/impairments: weakness, impaired endurance, impaired functional mobility, gait instability, pain, decreased safety awareness, impaired balance, decreased lower extremity function    Rehab potential is fair.    Activity tolerance: Fair    Discharge recommendations: home with home health (with 24/7 care)      Barriers to discharge:      Equipment recommendations: none     GOALS:   Multidisciplinary Problems       Physical Therapy Goals          Problem: Physical Therapy    Goal Priority Disciplines Outcome Goal Variances Interventions   Physical Therapy Goal     PT, PT/OT Ongoing, Progressing     Description: Goals to be met by 3/7/23  Pt will complete bed mobility MOD I.  Pt will complete sit to stand MOD I.  Pt will ambulate 150ft MOD I using RW.                       PLAN:    Patient to be seen 3 x/week to address the above listed problems via gait training, therapeutic activities, therapeutic exercises, neuromuscular re-education  Plan of Care expires: 03/07/23  Plan of Care reviewed with:  patient         02/22/2023

## 2023-02-22 NOTE — CONSULTS
[facility] Surg  Wound Care    Patient Name:  Christine Horner   MRN:  [id number]  Date: 2023  Diagnosis: Pneumonia involving right lung    History:     Past Medical History:   Diagnosis Date    Anemia 2023    Arthritis     Chronic back pain     COPD (chronic obstructive pulmonary disease)     COPD with acute exacerbation 2016    Pneumonia     SOB (shortness of breath)        Social History     Socioeconomic History    Marital status:    Tobacco Use    Smoking status: Former     Packs/day: 1.00     Years: 30.00     Pack years: 30.00     Types: Cigarettes     Quit date: 1/3/2012     Years since quittin.1   Substance and Sexual Activity    Alcohol use: No    Drug use: No    Sexual activity: Never     Birth control/protection: None       Precautions:     Allergies as of 2023 - Reviewed 2023   Allergen Reaction Noted    Ciprofloxacin Itching and Rash 2014    Nitrofurantoin monohyd/m-cryst Hives 2019    Penicillins Hives 2013       WOC Assessment Details/Treatment     Consulted this 62 yo female with past medical history of anemia, arthritis, COPD, chronic respiratory failure on home oxygen, pneumonia and chronic back pain\. She is admitted with pneumonia. She is awake and alert, assessment performed. Patient turned to left side independently. Less than 1x1cm partial thickness opening to her coccyx with moist pink wound bed and surrounding scarring consistent with healing stage 2 pressure injury. Some surrounding redness consistent with MASD. Orders placed. EMMY pump to be applied to bed. Heels intact with no redness. Will follow.     2023

## 2023-02-22 NOTE — PLAN OF CARE
Plan of care reviewed with patient with verbal understanding. Chart and orders reviewed.  Pt remains free from falls this shift, Safety precautions in place.   Pt currently resting comfortably in bed. Pain controlled with po pain med (see emar for pain admin).  Hourly rounding with bed in lowest position, side rails up, call light in reach.  Will continue to monitor until end of shift.       Problem: Pain Acute  Goal: Acceptable Pain Control and Functional Ability  Outcome: Ongoing, Progressing     Problem: Fall Injury Risk  Goal: Absence of Fall and Fall-Related Injury  Outcome: Ongoing, Progressing

## 2023-02-22 NOTE — PLAN OF CARE
Pt remains free from falls/injuries this shift. Safety precautions maintained. Pain managed with relaxation and pain medication. 3L NC, patient with new PT/OT eval.No s/s of acute distress noted. Will continue to monitor. Chart check completed.

## 2023-02-23 PROBLEM — C73 PAPILLARY THYROID CARCINOMA: Status: ACTIVE | Noted: 2020-02-10

## 2023-02-23 PROBLEM — R16.0 LIVER MASS: Status: ACTIVE | Noted: 2023-02-23

## 2023-02-23 LAB
ALBUMIN SERPL BCP-MCNC: 2.6 G/DL (ref 3.5–5.2)
ALP SERPL-CCNC: 154 U/L (ref 55–135)
ALT SERPL W/O P-5'-P-CCNC: 12 U/L (ref 10–44)
ANION GAP SERPL CALC-SCNC: 14 MMOL/L (ref 8–16)
ANISOCYTOSIS BLD QL SMEAR: SLIGHT
AST SERPL-CCNC: 44 U/L (ref 10–40)
BASOPHILS NFR BLD: 0 % (ref 0–1.9)
BILIRUB DIRECT SERPL-MCNC: 0.2 MG/DL (ref 0.1–0.3)
BILIRUB SERPL-MCNC: 0.3 MG/DL (ref 0.1–1)
BUN SERPL-MCNC: 10 MG/DL (ref 8–23)
BURR CELLS BLD QL SMEAR: ABNORMAL
CALCIUM SERPL-MCNC: 7.4 MG/DL (ref 8.7–10.5)
CHLORIDE SERPL-SCNC: 89 MMOL/L (ref 95–110)
CO2 SERPL-SCNC: 29 MMOL/L (ref 23–29)
CREAT SERPL-MCNC: 0.7 MG/DL (ref 0.5–1.4)
DIFFERENTIAL METHOD: ABNORMAL
EOSINOPHIL NFR BLD: 9 % (ref 0–8)
ERYTHROCYTE [DISTWIDTH] IN BLOOD BY AUTOMATED COUNT: 15 % (ref 11.5–14.5)
EST. GFR  (NO RACE VARIABLE): >60 ML/MIN/1.73 M^2
GLUCOSE SERPL-MCNC: 109 MG/DL (ref 70–110)
HCT VFR BLD AUTO: 27.4 % (ref 37–48.5)
HGB BLD-MCNC: 8.6 G/DL (ref 12–16)
HYPOCHROMIA BLD QL SMEAR: ABNORMAL
IMM GRANULOCYTES # BLD AUTO: ABNORMAL K/UL (ref 0–0.04)
IMM GRANULOCYTES NFR BLD AUTO: ABNORMAL % (ref 0–0.5)
LYMPHOCYTES NFR BLD: 22 % (ref 18–48)
MAGNESIUM SERPL-MCNC: 1.4 MG/DL (ref 1.6–2.6)
MCH RBC QN AUTO: 27.2 PG (ref 27–31)
MCHC RBC AUTO-ENTMCNC: 31.4 G/DL (ref 32–36)
MCV RBC AUTO: 87 FL (ref 82–98)
MONOCYTES NFR BLD: 3 % (ref 4–15)
NEUTROPHILS NFR BLD: 66 % (ref 38–73)
NRBC BLD-RTO: 0 /100 WBC
OVALOCYTES BLD QL SMEAR: ABNORMAL
PHOSPHATE SERPL-MCNC: 3.5 MG/DL (ref 2.7–4.5)
PLATELET # BLD AUTO: 308 K/UL (ref 150–450)
PLATELET BLD QL SMEAR: ABNORMAL
PMV BLD AUTO: 9.5 FL (ref 9.2–12.9)
POIKILOCYTOSIS BLD QL SMEAR: ABNORMAL
POLYCHROMASIA BLD QL SMEAR: ABNORMAL
POTASSIUM SERPL-SCNC: 4.2 MMOL/L (ref 3.5–5.1)
PROT SERPL-MCNC: 5.8 G/DL (ref 6–8.4)
RBC # BLD AUTO: 3.16 M/UL (ref 4–5.4)
SMUDGE CELLS BLD QL SMEAR: PRESENT
SODIUM SERPL-SCNC: 132 MMOL/L (ref 136–145)
TARGETS BLD QL SMEAR: ABNORMAL
WBC # BLD AUTO: 15.46 K/UL (ref 3.9–12.7)

## 2023-02-23 PROCEDURE — 97110 THERAPEUTIC EXERCISES: CPT

## 2023-02-23 PROCEDURE — 84100 ASSAY OF PHOSPHORUS: CPT | Performed by: INTERNAL MEDICINE

## 2023-02-23 PROCEDURE — 25000003 PHARM REV CODE 250: Performed by: NURSE PRACTITIONER

## 2023-02-23 PROCEDURE — 99900035 HC TECH TIME PER 15 MIN (STAT)

## 2023-02-23 PROCEDURE — 25500020 PHARM REV CODE 255: Performed by: INTERNAL MEDICINE

## 2023-02-23 PROCEDURE — 36415 COLL VENOUS BLD VENIPUNCTURE: CPT | Performed by: NURSE PRACTITIONER

## 2023-02-23 PROCEDURE — 63600175 PHARM REV CODE 636 W HCPCS: Performed by: NURSE PRACTITIONER

## 2023-02-23 PROCEDURE — 86800 THYROGLOBULIN ANTIBODY: CPT | Performed by: NURSE PRACTITIONER

## 2023-02-23 PROCEDURE — 94640 AIRWAY INHALATION TREATMENT: CPT

## 2023-02-23 PROCEDURE — 83735 ASSAY OF MAGNESIUM: CPT | Performed by: INTERNAL MEDICINE

## 2023-02-23 PROCEDURE — 25000242 PHARM REV CODE 250 ALT 637 W/ HCPCS: Performed by: INTERNAL MEDICINE

## 2023-02-23 PROCEDURE — 27000221 HC OXYGEN, UP TO 24 HOURS

## 2023-02-23 PROCEDURE — 82105 ALPHA-FETOPROTEIN SERUM: CPT | Performed by: NURSE PRACTITIONER

## 2023-02-23 PROCEDURE — 84432 ASSAY OF THYROGLOBULIN: CPT | Performed by: NURSE PRACTITIONER

## 2023-02-23 PROCEDURE — 36415 COLL VENOUS BLD VENIPUNCTURE: CPT | Performed by: INTERNAL MEDICINE

## 2023-02-23 PROCEDURE — 63600175 PHARM REV CODE 636 W HCPCS: Mod: TB,JG | Performed by: INTERNAL MEDICINE

## 2023-02-23 PROCEDURE — 21400001 HC TELEMETRY ROOM

## 2023-02-23 PROCEDURE — 97530 THERAPEUTIC ACTIVITIES: CPT

## 2023-02-23 PROCEDURE — 25000003 PHARM REV CODE 250: Performed by: INTERNAL MEDICINE

## 2023-02-23 PROCEDURE — 94761 N-INVAS EAR/PLS OXIMETRY MLT: CPT

## 2023-02-23 PROCEDURE — 94799 UNLISTED PULMONARY SVC/PX: CPT

## 2023-02-23 PROCEDURE — 80076 HEPATIC FUNCTION PANEL: CPT | Performed by: NURSE PRACTITIONER

## 2023-02-23 PROCEDURE — 80048 BASIC METABOLIC PNL TOTAL CA: CPT | Performed by: INTERNAL MEDICINE

## 2023-02-23 PROCEDURE — 97166 OT EVAL MOD COMPLEX 45 MIN: CPT

## 2023-02-23 PROCEDURE — 85027 COMPLETE CBC AUTOMATED: CPT | Performed by: INTERNAL MEDICINE

## 2023-02-23 PROCEDURE — 85007 BL SMEAR W/DIFF WBC COUNT: CPT | Performed by: INTERNAL MEDICINE

## 2023-02-23 RX ORDER — ALPRAZOLAM 0.5 MG/1
0.5 TABLET ORAL 3 TIMES DAILY PRN
Status: DISCONTINUED | OUTPATIENT
Start: 2023-02-23 | End: 2023-02-28 | Stop reason: HOSPADM

## 2023-02-23 RX ORDER — IPRATROPIUM BROMIDE 0.5 MG/2.5ML
0.5 SOLUTION RESPIRATORY (INHALATION)
Status: DISCONTINUED | OUTPATIENT
Start: 2023-02-23 | End: 2023-02-28 | Stop reason: HOSPADM

## 2023-02-23 RX ORDER — MAGNESIUM SULFATE HEPTAHYDRATE 40 MG/ML
2 INJECTION, SOLUTION INTRAVENOUS ONCE
Status: COMPLETED | OUTPATIENT
Start: 2023-02-23 | End: 2023-02-23

## 2023-02-23 RX ADMIN — TIZANIDINE 4 MG: 4 TABLET ORAL at 01:02

## 2023-02-23 RX ADMIN — IPRATROPIUM BROMIDE 0.5 MG: 0.5 SOLUTION RESPIRATORY (INHALATION) at 12:02

## 2023-02-23 RX ADMIN — DILTIAZEM HYDROCHLORIDE 120 MG: 120 CAPSULE, COATED, EXTENDED RELEASE ORAL at 09:02

## 2023-02-23 RX ADMIN — LEVALBUTEROL HYDROCHLORIDE 0.63 MG: 0.63 SOLUTION RESPIRATORY (INHALATION) at 07:02

## 2023-02-23 RX ADMIN — BUDESONIDE 0.5 MG: 0.5 INHALANT ORAL at 07:02

## 2023-02-23 RX ADMIN — MAGNESIUM SULFATE HEPTAHYDRATE 2 G: 40 INJECTION, SOLUTION INTRAVENOUS at 09:02

## 2023-02-23 RX ADMIN — GABAPENTIN 400 MG: 400 CAPSULE ORAL at 01:02

## 2023-02-23 RX ADMIN — SENNOSIDES AND DOCUSATE SODIUM 1 TABLET: 50; 8.6 TABLET ORAL at 09:02

## 2023-02-23 RX ADMIN — GABAPENTIN 400 MG: 400 CAPSULE ORAL at 09:02

## 2023-02-23 RX ADMIN — MEROPENEM AND SODIUM CHLORIDE 500 MG: 500 INJECTION, SOLUTION INTRAVENOUS at 11:02

## 2023-02-23 RX ADMIN — OXYCODONE AND ACETAMINOPHEN 1 TABLET: 7.5; 325 TABLET ORAL at 05:02

## 2023-02-23 RX ADMIN — ENOXAPARIN SODIUM 40 MG: 40 INJECTION SUBCUTANEOUS at 05:02

## 2023-02-23 RX ADMIN — DILTIAZEM HYDROCHLORIDE 120 MG: 120 CAPSULE, COATED, EXTENDED RELEASE ORAL at 08:02

## 2023-02-23 RX ADMIN — TIZANIDINE 4 MG: 4 TABLET ORAL at 05:02

## 2023-02-23 RX ADMIN — DOXYCYCLINE HYCLATE 100 MG: 100 TABLET, COATED ORAL at 08:02

## 2023-02-23 RX ADMIN — DOXYCYCLINE HYCLATE 100 MG: 100 TABLET, COATED ORAL at 09:02

## 2023-02-23 RX ADMIN — LEVOTHYROXINE SODIUM 125 MCG: 125 TABLET ORAL at 05:02

## 2023-02-23 RX ADMIN — IPRATROPIUM BROMIDE 0.5 MG: 0.5 SOLUTION RESPIRATORY (INHALATION) at 07:02

## 2023-02-23 RX ADMIN — ALPRAZOLAM 0.5 MG: 0.5 TABLET ORAL at 05:02

## 2023-02-23 RX ADMIN — ARFORMOTEROL TARTRATE 15 MCG: 15 SOLUTION RESPIRATORY (INHALATION) at 07:02

## 2023-02-23 RX ADMIN — MEROPENEM AND SODIUM CHLORIDE 500 MG: 500 INJECTION, SOLUTION INTRAVENOUS at 05:02

## 2023-02-23 RX ADMIN — MEROPENEM AND SODIUM CHLORIDE 500 MG: 500 INJECTION, SOLUTION INTRAVENOUS at 12:02

## 2023-02-23 RX ADMIN — OXYCODONE AND ACETAMINOPHEN 1 TABLET: 7.5; 325 TABLET ORAL at 11:02

## 2023-02-23 RX ADMIN — GABAPENTIN 400 MG: 400 CAPSULE ORAL at 05:02

## 2023-02-23 RX ADMIN — CALCITRIOL CAPSULES 0.25 MCG 0.25 MCG: 0.25 CAPSULE ORAL at 09:02

## 2023-02-23 RX ADMIN — ONDANSETRON 8 MG: 8 TABLET, ORALLY DISINTEGRATING ORAL at 02:02

## 2023-02-23 RX ADMIN — IOHEXOL 100 ML: 350 INJECTION, SOLUTION INTRAVENOUS at 02:02

## 2023-02-23 RX ADMIN — MONTELUKAST 10 MG: 10 TABLET, FILM COATED ORAL at 09:02

## 2023-02-23 RX ADMIN — BUSPIRONE HYDROCHLORIDE 7.5 MG: 5 TABLET ORAL at 08:02

## 2023-02-23 RX ADMIN — BUSPIRONE HYDROCHLORIDE 7.5 MG: 5 TABLET ORAL at 09:02

## 2023-02-23 RX ADMIN — GABAPENTIN 400 MG: 400 CAPSULE ORAL at 08:02

## 2023-02-23 NOTE — PROGRESS NOTES
[facility]  Pulmonology rogress Note    Patient Name: Christine Horner  MRN: [id number]  Admission Date: 2/20/2023  Hospital Length of Stay: 3 days  Code Status: Full Code  Attending Provider: Ramon Tenorio, *  Primary Care Provider: Eloy Hdez MD   Principal Problem: Pneumonia involving right lung    Subjective:     63 year old female with a known past medical history of anemia, arthritis, COPD, chronic respiratory failure on home oxygen at 3L NC, pneumonia, and chronic back pain who presented to the ED 2/20 with increased oxygen requirements. Of note, she was discharged from the hospital last week on Levaquin for pneumonia. Home health went to see patient today and found her oxygen saturation to be 82% on 3L.  Her flow was increased to 5L with slow improvement to low 90s. She was instructed to come to the ED for further evaluation. She reports she was tired and weak over the weekend. Workup in the ED with increasing density in right lung on CT chest. She reports cough is becoming productive. The sputum is yellow greenish color. Previously cough was not productive. Hospital medicine consulted for admission. Discussed CT findings with patient. Pulmonary was consulted for the above.    At the time of my exam in the ER, the pt is awake and alert on 3L NC. She is able to speak in full sentences and is without any specific compliants. No family at bedside.    Interval History:  2/21: did not sleep last night as she boarded in the ER, remains on 3L NC, with cough that is nonproductive   2/22: slept well overnight, on home flow 3L, with some cough but not much sputum production  2/23: remains on home flow 3L NC, pt reports some variability in her HR with exertion so has not been able to walk the halls yet, asking about breakfast    ROS complete and negative unless stated in the interval HPI    Objective:     Vital Signs (Most Recent):  Temp: 98.2 °F (36.8 °C) (02/23/23 0734)  Pulse: 73 (02/23/23  0734)  Resp: 18 (02/23/23 0734)  BP: (!) 101/59 (02/23/23 0734)  SpO2: 96 % (02/23/23 0734)   Vital Signs (24h Range):  Temp:  [97.5 °F (36.4 °C)-98.6 °F (37 °C)] 98.2 °F (36.8 °C)  Pulse:  [70-78] 73  Resp:  [17-22] 18  SpO2:  [94 %-97 %] 96 %  BP: ()/(53-63) 101/59     Weight: 61.5 kg (135 lb 9.3 oz)  Body mass index is 22.56 kg/m².      Intake/Output Summary (Last 24 hours) at 2/23/2023 0912  Last data filed at 2/23/2023 0908  Gross per 24 hour   Intake 915.83 ml   Output 4100 ml   Net -3184.17 ml       Physical Exam  Vitals reviewed.   Constitutional:       General: She is awake. She is not in acute distress.     Comments: Chronically ill appearing lady on 3L NC in NAD   Cardiovascular:      Pulses:           Radial pulses are 2+ on the right side and 2+ on the left side.   Pulmonary:      Effort: Pulmonary effort is normal.      Breath sounds: Decreased breath sounds present.      Comments: On 3L NC  Abdominal:      General: There is no distension.      Palpations: Abdomen is soft.   Musculoskeletal:      Right lower leg: No edema.      Left lower leg: No edema.   Skin:     General: Skin is warm and dry.   Neurological:      General: No focal deficit present.      Mental Status: She is alert.   Psychiatric:         Behavior: Behavior is cooperative.         Vents:  Oxygen Concentration (%): 32 (02/23/23 0712)    Lines/Drains/Airways       Peripheral Intravenous Line  Duration                  Peripheral IV - Single Lumen 02/22/23 1949 22 G Left;Posterior Hand <1 day                    Significant Labs:    CBC/Anemia Profile:  Recent Labs   Lab 02/22/23  0632 02/23/23 0632   WBC 15.12* 15.46*   HGB 8.2* 8.6*   HCT 26.6* 27.4*    308   MCV 88 87   RDW 15.3* 15.0*        Chemistries:  Recent Labs   Lab 02/22/23  0632 02/23/23 0632    132*   K 4.4 4.2   CL 90* 89*   CO2 32* 29   BUN 8 10   CREATININE 0.6 0.7   CALCIUM 7.5* 7.4*   MG 1.5* 1.4*   PHOS 3.5 3.5       All pertinent labs within the  past 24 hours have been reviewed.    Significant Imaging:  I have reviewed all pertinent imaging results/findings within the past 24 hours.      ABG  Recent Labs   Lab 02/20/23  1430   PH 7.425   PO2 72*   PCO2 60.1*   HCO3 39.5*   BE 15     Assessment/Plan:     Pulmonary  * Pneumonia involving right lung  - previous pseudomonas pneumonia in Nov 2022, admitted earlier this month for pneumonia and was discharge on levaquin, pt returned to the ER 2/20 for increased O2 needs per her home ritika team   - remains on 3L NC which is her baseline  - remains on merrem, possible d/c today per primary team, if d/c can take keflex for 5 more days, follow up with PCP/pulm  - with cough but no sputum production so no culture obtained   - PT, IS, ambulate, OOB    Acute on chronic respiratory failure with hypoxia  Patient with Hypoxic Respiratory failure which is Acute on chronic.  she is on home oxygen at 3 LPM. Supplemental oxygen was provided and noted- Oxygen Concentration (%):  [32] 32.   Signs/symptoms of respiratory failure include- worseing hypoxia on home flow O2. Contributing diagnoses includes - Pneumonia Labs and images were reviewed. Patient Has not had a recent ABG. Will treat underlying causes and adjust management of respiratory failure as follows    - see plan for pneumonia       Pulmonary team will remain available. Please call if we can be of assistance sooner or if questions should arise.       Fly Kahn, NP  Pulmonology  [facility] - Med S[facility]

## 2023-02-23 NOTE — ASSESSMENT & PLAN NOTE
Hx of thyroid cancer, surgical removal 1/2020, I-131 11/2020, followed by Dr. Feliciano at [facility].       --Continue levothyroxine  --Thyroglobulin Ab

## 2023-02-23 NOTE — PLAN OF CARE
Nutrition Recs: 2/22  1. Recommend pt continues Adult Regular (IDDSI Level 7) as medically appropriate.   2. Recommend to weigh pt daily.  3. Collaboration of care with medical providers.    Goals:   1. Pt will continue to consume % of energy needs by RD follow up.  Raymon Fountain, Registration Eligible, Provisional LDN

## 2023-02-23 NOTE — ASSESSMENT & PLAN NOTE
Increasing abdominal distension, ordered Abdominal US: multiple liver masses, ordered CT Liver: Multiple large hypoenhancing masses in the liver, largest on the right measuring 12 x 10 cm, largest on the left measuring 2.2 x 1.9 cm, compatible with metastatic disease. Hx of Thyroid Cancer    --Thyroglobulin Ab, AFP- to evaluate  -Consult IR for possible biopsy  -Consult Hem/Onc

## 2023-02-23 NOTE — PROGRESS NOTES
[facility] Medicine  Progress Note    Patient Name: Christine Horner  MRN: [id number]  Patient Class: IP- Inpatient   Admission Date: 2/20/2023  Length of Stay: 3 days  Attending Physician: Ramon Tenorio, *  Primary Care Provider: Eloy Hdez MD        Subjective:     Principal Problem:Pneumonia involving right lung        HPI:  The patient is a 62 yo female with past medical history of anemia, arthritis, COPD, chronic respiratory failure on home oxygen, pneumonia and chronic back pain who presented to the ED with increased oxygen requirements. She is usually on 3L nasal cannula. She was discharged from the hospital last week on Levaquin for pneumonia. Home health went to see patient today and found her oxygen saturation to be 82% on 3L.  Her flow was increased to 5L with slow improvement to low 90s. She was instructed to come to the ED for further evaluation. She reports she was tired and weak over the weekend. Workup in the ED with increasing density in right lung on CT chest. She reports cough is becoming productive. The sputum is yellow greenish color. Previously cough was not productive.Hospital medicine consulted for admission. Discussed CT findings with patient.       Overview/Hospital Course:  63 year old female, under the management of Hospital Medicine for pneumonia, acute on chronic respiratory failure. Patient O2 requirements decreased overnight, currently at baseline with 3L NC, SpO2 stable: 95%. Currently treated with Doxycycline and Merrem due to Hx of respiratory pseudomonas (11/22). Sputum culture ordered, patient has not been able to produce sputum for culture, has been given 3% saline nebs to assist without success. BC: NGTD. PT/OT ordered. Patient with significant abdominal distension, KUB non-contributory. Abdominal US: concerning for liver masses. Ordered triple-phase CT of Liver: Multiple large hypoenhancing masses in the liver, largest on the right measuring 12 x  10 cm, largest on the left measuring 2.2 x 1.9 cm, compatible with metastatic disease. Ordered AFP, consulted IR for possible Biopsy, consulted Hem/Onc. Discussed with patient, has Hx of Thyroid Cancer (2020), treated with thyroidectomy 1/2020, I-131 11/2020. Ordered Thyroglobulin Ab, AFP.       Interval History: CT of liver, multiple liver masses, Consulted Hem/Onc, Consulted IR for possible biopsy.     Review of Systems   Constitutional:  Positive for activity change and fatigue. Negative for appetite change, chills, diaphoresis, fever and unexpected weight change.   HENT:  Negative for congestion, hearing loss, mouth sores, postnasal drip, rhinorrhea, sore throat and trouble swallowing.    Eyes:  Negative for discharge and visual disturbance.   Respiratory:  Positive for cough and shortness of breath. Negative for chest tightness.    Cardiovascular:  Negative for chest pain, palpitations and leg swelling.   Gastrointestinal:  Negative for blood in stool, constipation, diarrhea, nausea and vomiting.   Endocrine: Negative for cold intolerance and heat intolerance.   Genitourinary:  Negative for difficulty urinating, dyspareunia, flank pain and hematuria.   Musculoskeletal:  Negative for arthralgias, back pain and myalgias.   Skin: Negative.    Neurological:  Positive for weakness. Negative for dizziness, light-headedness and headaches.   Hematological:  Negative for adenopathy. Does not bruise/bleed easily.   Psychiatric/Behavioral:  Negative for agitation, behavioral problems and confusion. The patient is nervous/anxious.    Objective:     Vital Signs (Most Recent):  Temp: 98.2 °F (36.8 °C) (02/23/23 1300)  Pulse: 74 (02/23/23 1300)  Resp: 18 (02/23/23 1300)  BP: 104/62 (02/23/23 1300)  SpO2: 96 % (02/23/23 0734) Vital Signs (24h Range):  Temp:  [97.7 °F (36.5 °C)-98.6 °F (37 °C)] 98.2 °F (36.8 °C)  Pulse:  [70-78] 74  Resp:  [16-20] 18  SpO2:  [94 %-97 %] 96 %  BP: ()/(54-63) 104/62     Weight: 61.5 kg (135  lb 9.3 oz)  Body mass index is 22.56 kg/m².    Intake/Output Summary (Last 24 hours) at 2/23/2023 1727  Last data filed at 2/23/2023 1451  Gross per 24 hour   Intake 1118.83 ml   Output 4400 ml   Net -3281.17 ml      Physical Exam  Vitals and nursing note reviewed.   Constitutional:       General: She is not in acute distress.     Appearance: She is well-developed. She is ill-appearing.   HENT:      Head: Normocephalic and atraumatic.      Right Ear: Hearing and external ear normal.      Left Ear: Hearing and external ear normal.      Nose: No rhinorrhea.      Right Sinus: No maxillary sinus tenderness or frontal sinus tenderness.      Left Sinus: No maxillary sinus tenderness or frontal sinus tenderness.      Mouth/Throat:      Mouth: No oral lesions.      Pharynx: Uvula midline.   Eyes:      General:         Right eye: No discharge.         Left eye: No discharge.      Conjunctiva/sclera: Conjunctivae normal.      Pupils: Pupils are equal, round, and reactive to light.   Neck:      Thyroid: No thyromegaly.      Vascular: No carotid bruit.      Trachea: No tracheal deviation.   Cardiovascular:      Rate and Rhythm: Normal rate and regular rhythm.      Pulses:           Dorsalis pedis pulses are 2+ on the right side and 2+ on the left side.      Heart sounds: Normal heart sounds, S1 normal and S2 normal. No murmur heard.  Pulmonary:      Effort: Pulmonary effort is normal. No respiratory distress.      Breath sounds: Normal breath sounds.   Abdominal:      General: Bowel sounds are decreased. There is distension.      Palpations: Abdomen is soft. There is no mass.      Tenderness: There is generalized abdominal tenderness.   Musculoskeletal:         General: Normal range of motion.      Cervical back: Normal range of motion.   Lymphadenopathy:      Cervical: No cervical adenopathy.      Upper Body:      Right upper body: No supraclavicular adenopathy.      Left upper body: No supraclavicular adenopathy.   Skin:      General: Skin is warm and dry.      Capillary Refill: Capillary refill takes less than 2 seconds.      Findings: No rash.   Neurological:      Mental Status: She is alert and oriented to person, place, and time.      Sensory: No sensory deficit.      Coordination: Coordination normal.      Gait: Gait normal.   Psychiatric:         Mood and Affect: Mood is not anxious or depressed.         Speech: Speech normal.         Behavior: Behavior normal.         Thought Content: Thought content normal.         Judgment: Judgment normal.       Significant Labs: All pertinent labs within the past 24 hours have been reviewed.  CBC:   Recent Labs   Lab 02/22/23  0632 02/23/23  0632   WBC 15.12* 15.46*   HGB 8.2* 8.6*   HCT 26.6* 27.4*    308     CMP:   Recent Labs   Lab 02/22/23  0632 02/23/23  0632 02/23/23  0948    132*  --    K 4.4 4.2  --    CL 90* 89*  --    CO2 32* 29  --    GLU 90 109  --    BUN 8 10  --    CREATININE 0.6 0.7  --    CALCIUM 7.5* 7.4*  --    PROT  --   --  5.8*   ALBUMIN  --   --  2.6*   BILITOT  --   --  0.3   ALKPHOS  --   --  154*   AST  --   --  44*   ALT  --   --  12   ANIONGAP 14 14  --        Significant Imaging: I have reviewed all pertinent imaging results/findings within the past 24 hours.      Assessment/Plan:      * Pneumonia involving right lung  Previously treated for Pseudomonal pneumonia  Continue meropenem and doxycycline  f/u sputum culture- unable to collect  Appreciate pulmonology    Liver mass  Increasing abdominal distension, ordered Abdominal US: multiple liver masses, ordered CT Liver: Multiple large hypoenhancing masses in the liver, largest on the right measuring 12 x 10 cm, largest on the left measuring 2.2 x 1.9 cm, compatible with metastatic disease. Hx of Thyroid Cancer    --Thyroglobulin Ab, AFP- to evaluate  -Consult IR for possible biopsy  -Consult Hem/Onc          Papillary thyroid carcinoma  Thyroidectomy: 1/2020, Radiation ablation: 11/2020, managed by   Conner at [facility]    --Continue levothyroxine      CLL (chronic lymphocytic leukemia)  Followed by outside heme-onc        Postablative hypothyroidism  Hx of thyroid cancer, surgical removal 1/2020, I-131 11/2020, followed by Dr. Feliciano at [facility].       --Continue levothyroxine  --Thyroglobulin Ab      Paroxysmal SVT (supraventricular tachycardia)  Continue home medications  Heart rate currently controlled      Acute on chronic respiratory failure with hypoxia  Patient with Hypercapnic and Hypoxic Respiratory failure which is Acute on chronic.  she is on home oxygen at 3 LPM. Supplemental oxygen was provided and noted- Oxygen Concentration (%):  [32] 32.   Signs/symptoms of respiratory failure include- tachypnea, increased work of breathing and lethargy. Contributing diagnoses includes - COPD, Pleural effusion and Pneumonia Labs and images were reviewed. Patient Has recent ABG, which has been reviewed. Will treat underlying causes and adjust management of respiratory failure as follows- IV antibiotics, supplemental oxygen, prn breathing treatments      VTE Risk Mitigation (From admission, onward)         Ordered     enoxaparin injection 40 mg  Daily         02/20/23 1656     IP VTE HIGH RISK PATIENT  Once         02/20/23 1656     Place sequential compression device  Until discontinued         02/20/23 1656                Discharge Planning   MARK:      Code Status: Full Code   Is the patient medically ready for discharge?:     Reason for patient still in hospital (select all that apply): Patient trending condition  Discharge Plan A: Home Health   Discharge Delays: None known at this time              Kinsey Huerta NP  Department of Hospital Medicine   [facility] - Med S[facility]

## 2023-02-23 NOTE — ASSESSMENT & PLAN NOTE
- previous pseudomonas pneumonia in Nov 2022, admitted earlier this month for pneumonia and was discharge on levaquin, pt returned to the ER 2/20 for increased O2 needs per her home ritika team   - remains on 3L NC which is her baseline  - remains on merrem, possible d/c today per primary team, if d/c can take keflex for 5 more days, follow up with PCP/pulm  - with cough but no sputum production so no culture obtained   - PT, IS, ambulate, OOB

## 2023-02-23 NOTE — CONSULTS
[facility] - Med [facility]  Adult Nutrition  Consult Note    SUMMARY     Recommendations  1. Recommend pt continues Adult Regular (IDDSI Level 7) as medically appropriate.   2. Recommend to weigh pt daily.    Goals:   1. Pt will continue to consume % of energy needs by RD follow up.  Nutrition Goal Status: new  Communication of RD Recs: other (comment) (POC: Sticky Note)    Assessment and Plan    Nutrition Problem  Increased energy expenditure    Related to (etiology):   Physiological causes increasing nutrient needs    Signs and Symptoms (as evidenced by):   Pneumonia involving right lung    Interventions(treatment strategy):  1. Recommend pt continues Adult Regular (IDDSI Level 7) as medically appropriate.   2. Recommend to weigh pt daily.  3. Collaboration of care with medical providers.    Nutrition Diagnosis Status:   New       Malnutrition Assessment                                       Reason for Assessment    Reason For Assessment: consult  Diagnosis: pulmonary disease  Relevant Medical History: COPD, SOB, Acute on chronic respiratory failure with hypoxia  Interdisciplinary Rounds: did not attend  General Information Comments:   2/22: 63 y.o female admitted w/ current principal problem of Pneumonia involving right lung. Pt stated that she has a good appetite and is consuming % of meals. NFPE not completed, pt seem agitated and combative, but apologetic. However, pt appeared well nourished. Last charted weight for by is 135lbs, BMI 22.56 (WNL for age). Pt states that she had a choking incident while eating lunch today, however, the pt denies v/n/d, chewing and swallowing difficulties (Other than isolated incident). The pt is noted to have  Coccyx Partial thickness tissue loss. Shallow open ulcer with a red or pink wound bed, without slough. Which, the pt states is healed. Also, the pt RN endorses that the pt wound is insignificant. The pt Ruddy score is 20. The pt LBM was 2/19. All pertinent labs and  "medications reviewed. Dietitian will continue to monitor.  Nutrition Discharge Planning: Adult Regular    Nutrition Risk Screen    Nutrition Risk Screen: no indicators present    Nutrition/Diet History    Spiritual, Cultural Beliefs, Zoroastrianism Practices, Values that Affect Care: no  Food Allergies: NKFA  Factors Affecting Nutritional Intake: None identified at this time    Anthropometrics    Temp: 97.5 °F (36.4 °C)  Height: 5' 5" (165.1 cm)  Height (inches): 65 in  Weight Method: Bed Scale  Weight: 61.5 kg (135 lb 9.3 oz)  Weight (lb): 135.58 lb  Ideal Body Weight (IBW), Female: 125 lb  % Ideal Body Weight, Female (lb): 108.46 %  BMI (Calculated): 22.6  BMI Grade: 18.5-24.9 - normal       Lab/Procedures/Meds  BMP  Lab Results   Component Value Date     02/22/2023    K 4.4 02/22/2023    CL 90 (L) 02/22/2023    CO2 32 (H) 02/22/2023    BUN 8 02/22/2023    CREATININE 0.6 02/22/2023    CALCIUM 7.5 (L) 02/22/2023    ANIONGAP 14 02/22/2023    EGFRNORACEVR >60 02/22/2023       Pertinent Labs Reviewed: reviewed  Pertinent Medications Reviewed: reviewed  Scheduled Meds:   arformoteroL  15 mcg Nebulization BID    budesonide  0.5 mg Nebulization Q12H    busPIRone  7.5 mg Oral BID    calcitRIOL  0.25 mcg Oral Daily    diltiaZEM  120 mg Oral BID    doxycycline  100 mg Oral Q12H    enoxaparin  40 mg Subcutaneous Daily    gabapentin  400 mg Oral QID    ipratropium  0.5 mg Nebulization Q6H    levothyroxine  125 mcg Oral Before breakfast    meropenem (MERREM) IVPB  500 mg Intravenous Q6H    montelukast  10 mg Oral Daily    polyethylene glycol  17 g Oral Daily    senna-docusate 8.6-50 mg  1 tablet Oral Daily     Continuous Infusions:  PRN Meds:.artificial tears, levalbuterol, ondansetron, oxyCODONE-acetaminophen, sodium chloride 0.9%, tiZANidine    Physical Findings/Assessment         Estimated/Assessed Needs    Weight Used For Calorie Calculations: 61.5 kg (135 lb 9.3 oz)  Energy Calorie Requirements (kcal): 5860-0917 " (25-30kcal/kg per cancer vs COPD)  Energy Need Method: Kcal/kg  Protein Requirements: 62-92 (1.0-1.5g/kg per cancer vs COPD)  Weight Used For Protein Calculations: 61.5 kg (135 lb 9.3 oz)  Fluid Requirements (mL): 1538  Estimated Fluid Requirement Method: RDA Method  RDA Method (mL): 1538  CHO Requirement: 192-231      Nutrition Prescription Ordered    Current Diet Order: Adult Regular (IDDSI Level 7)    Evaluation of Received Nutrient/Fluid Intake    % Kcal Needs:   % Protein Needs:   I/O: -892.8  Energy Calories Required: meeting needs  Protein Required: meeting needs  Fluid Required: not meeting needs  Tolerance: tolerating  % Intake of Estimated Energy Needs: 75 - 100 %  % Meal Intake: 75 - 100 %    Nutrition Risk    Level of Risk/Frequency of Follow-up: low       Monitor and Evaluation    Food and Nutrient Intake: energy intake, food and beverage intake  Food and Nutrient Adminstration: diet order  Knowledge/Beliefs/Attitudes: food and nutrition knowledge/skill, beliefs and attitudes  Anthropometric Measurements: weight, weight change, body mass index  Biochemical Data, Medical Tests and Procedures: electrolyte and renal panel, gastrointestinal profile, glucose/endocrine profile, inflammatory profile, lipid profile  Nutrition-Focused Physical Findings: overall appearance       Nutrition Follow-Up    RD Follow-up?: Yes  Raymon Fountain, Registration Eligible, Provisional LDN

## 2023-02-23 NOTE — PT/OT/SLP EVAL
Occupational Therapy   Evaluation    Name: Christine Horner  MRN: [id number]  Admitting Diagnosis: Pneumonia involving right lung  Recent Surgery: * No surgery found *      Recommendations:     Discharge Recommendations: home health OT (with 24/7 care)  Discharge Equipment Recommendations:  none  Barriers to discharge:  Decreased caregiver support    Assessment:     Christine Horner is a 63 y.o. female with a medical diagnosis of Pneumonia involving right lung.  She presents with the following performance deficits affecting function: weakness, impaired endurance, impaired self care skills, impaired functional mobility, gait instability, impaired balance, decreased lower extremity function, decreased safety awareness, pain, impaired cardiopulmonary response to activity.      Rehab Prognosis: Good; patient would benefit from acute skilled OT services to address these deficits and reach maximum level of function.       Plan:     Patient to be seen 2 x/week to address the above listed problems via therapeutic activities, therapeutic exercises, self-care/home management  Plan of Care Expires: 03/09/23  Plan of Care Reviewed with: patient    Subjective     Chief Complaint: Increased Pain in abdomen/R flank  Patient/Family Comments/goals: get better    Occupational Profile:  Living Environment: lives with  in a 1 story house with threshold to enter. Pt's  works during the day and pt is alone at this time.  able to assist when needed. Pt's daughter lives and works nearby.   Previous level of function: Pt required assist with ADLs. Uses 3L O2 at home. Mod (I) with functional mobility using rollator household distances and wheelchair when out in the community for appointments and other visits. Able to stand pivot t/f to w/c with Mod (I).   Roles and Routines: does not drive  Equipment Used at Home: shower chair, bedside commode, other (see comments), wheelchair, rollator (built in shower  bench)  Assistance upon Discharge:     Pain/Comfort:  Pain Rating 1: 9/10  Location - Side 1: Right  Location - Orientation 1: generalized  Location 1: flank (, discomfort d/t abdominal distention.)  Pain Addressed 1: Reposition, Distraction, Nurse notified    Objective:     Communicated with: Nurse and epic chart review prior to session.  Patient found HOB elevated with peripheral IV, telemetry, PureWick, oxygen upon OT entry to room.    General Precautions: Standard, fall  Orthopedic Precautions: N/A  Braces: N/A  Respiratory Status: Nasal cannula, flow 3 L/min    Occupational Performance:    Bed Mobility:    Unable to complete at this time d/t increased pain and nausea. Nurse notified and provided medication.   Per PT eval 2/21/23, pt SPV for bed mobility.     Activities of Daily Living:  Toileting: total assistance pt utilizing purewick in bed    Cognitive/Visual Perceptual:  Cognitive/Psychosocial Skills:     -       Oriented to: Person, Place, Time, and Situation   -       Follows Commands/attention:Follows multistep  commands  -       Communication: clear/fluent  -       Safety awareness/insight to disability: impaired     Physical Exam:  Sensation:    -       Impaired  Pt reports tingling throughout BLE and in B hands.  Upper Extremity Range of Motion:     -       Right Upper Extremity: WNL  -       Left Upper Extremity: WNL  Upper Extremity Strength:    -       Right Upper Extremity: 4/5 grossly  -       Left Upper Extremity: 4/5 grossly   Strength:    -       Right Upper Extremity: WNL  -       Left Upper Extremity: WNL    AMPAC 6 Click ADL:  AMPAC Total Score: 14    Treatment & Education:  Unable to complete bed mobility and EOB/OOB activity d/t increased pain and nausea. Pt also awaiting upcoming CT scan today. In bed assessment completed. Patient educated on role of OT in acute setting and benefits of participation. Educated on importance of OOB activity and calling for A to meet needs.  Encouraged completion of B UE AROM therex throughout the day to tolerance to increase functional strength and activity tolerance. Educated patient on importance of increased tolerance to upright position and direct impact on CV endurance and strength. Patient encouraged to sit up in bed with HOB elevated/bed in chair position for a minimum of 2 consecutive hours per day, as well as for all meals. Patient stated understanding and in agreement with POC.     Patient left HOB elevated with all lines intact, call button in reach, and nurse notified    GOALS:   Multidisciplinary Problems       Occupational Therapy Goals          Problem: Occupational Therapy    Goal Priority Disciplines Outcome Interventions   Occupational Therapy Goal     OT, PT/OT     Description: Goals to be met by: 3/9/23     Patient will increase functional independence with ADLs by performing:    Toileting from bedside commode with Minimal Assistance for hygiene and clothing management.   Stand pivot transfers with Supervision.  Upper extremity exercise program x20 reps per handout, with independence.                         History:     Past Medical History:   Diagnosis Date    Anemia 2023    Arthritis     Chronic back pain     COPD (chronic obstructive pulmonary disease)     COPD with acute exacerbation 2016    Pneumonia     SOB (shortness of breath)          Past Surgical History:   Procedure Laterality Date    BACK SURGERY       SECTION      TONSILLECTOMY      WISDOM TOOTH EXTRACTION         Time Tracking:     OT Date of Treatment: 23  OT Start Time: 1410  OT Stop Time: 1435  OT Total Time (min): 25 min    Billable Minutes:Evaluation 15  Therapeutic Activity 10    2023  Alejandra Rondon OT

## 2023-02-23 NOTE — ASSESSMENT & PLAN NOTE
Patient with Hypoxic Respiratory failure which is Acute on chronic.  she is on home oxygen at 3 LPM. Supplemental oxygen was provided and noted- Oxygen Concentration (%):  [32] 32.   Signs/symptoms of respiratory failure include- worseing hypoxia on home flow O2. Contributing diagnoses includes - Pneumonia Labs and images were reviewed. Patient Has not had a recent ABG. Will treat underlying causes and adjust management of respiratory failure as follows    - see plan for pneumonia

## 2023-02-23 NOTE — PLAN OF CARE
[facility] - Med Surg  Discharge Reassessment    Primary Care Provider: Eloy Hdez MD    Expected Discharge Date: Per Attending     Reassessment (most recent)       Discharge Reassessment - 02/23/23 1121          Discharge Reassessment    Assessment Type Discharge Planning Reassessment     Did the patient's condition or plan change since previous assessment? No     Discharge Plan discussed with: --   NP, April Deanna    Communicated MARK with patient/caregiver Date not available/Unable to determine     Discharge Plan A Home Health     DME Needed Upon Discharge  none     Discharge Barriers Identified None     Why the patient remains in the hospital Requires continued medical care        Post-Acute Status    Post-Acute Authorization Home Health     Home Health Status Set-up Complete/Auth obtained     Discharge Delays None known at this time                   Patient has no d/c needs at this time. Sw to follow up, as needed, for d/c. Planning purposes.

## 2023-02-23 NOTE — PLAN OF CARE
Pt resting in bed, remains free of falls and injuries this shift. VSS. No obvious s/sx of distress noted. Continuous cardiac monitoring in place as ordered. Antibiotics administered as ordered. POC reviewed with the patient, verbalized understanding. No further needs at this time, call light within reach. Continue POC as ordered, chart check completed and all orders reviewed.

## 2023-02-23 NOTE — ASSESSMENT & PLAN NOTE
Thyroidectomy: 1/2020, Radiation ablation: 11/2020, managed by Dr. Prieto at [facility]    --Continue levothyroxine

## 2023-02-23 NOTE — SUBJECTIVE & OBJECTIVE
Interval History: CT of liver, multiple liver masses, Consulted Hem/Onc, Consulted IR for possible biopsy.     Review of Systems   Constitutional:  Positive for activity change and fatigue. Negative for appetite change, chills, diaphoresis, fever and unexpected weight change.   HENT:  Negative for congestion, hearing loss, mouth sores, postnasal drip, rhinorrhea, sore throat and trouble swallowing.    Eyes:  Negative for discharge and visual disturbance.   Respiratory:  Positive for cough and shortness of breath. Negative for chest tightness.    Cardiovascular:  Negative for chest pain, palpitations and leg swelling.   Gastrointestinal:  Negative for blood in stool, constipation, diarrhea, nausea and vomiting.   Endocrine: Negative for cold intolerance and heat intolerance.   Genitourinary:  Negative for difficulty urinating, dyspareunia, flank pain and hematuria.   Musculoskeletal:  Negative for arthralgias, back pain and myalgias.   Skin: Negative.    Neurological:  Positive for weakness. Negative for dizziness, light-headedness and headaches.   Hematological:  Negative for adenopathy. Does not bruise/bleed easily.   Psychiatric/Behavioral:  Negative for agitation, behavioral problems and confusion. The patient is nervous/anxious.    Objective:     Vital Signs (Most Recent):  Temp: 98.2 °F (36.8 °C) (02/23/23 1300)  Pulse: 74 (02/23/23 1300)  Resp: 18 (02/23/23 1300)  BP: 104/62 (02/23/23 1300)  SpO2: 96 % (02/23/23 0734) Vital Signs (24h Range):  Temp:  [97.7 °F (36.5 °C)-98.6 °F (37 °C)] 98.2 °F (36.8 °C)  Pulse:  [70-78] 74  Resp:  [16-20] 18  SpO2:  [94 %-97 %] 96 %  BP: ()/(54-63) 104/62     Weight: 61.5 kg (135 lb 9.3 oz)  Body mass index is 22.56 kg/m².    Intake/Output Summary (Last 24 hours) at 2/23/2023 1727  Last data filed at 2/23/2023 1451  Gross per 24 hour   Intake 1118.83 ml   Output 4400 ml   Net -3281.17 ml      Physical Exam  Vitals and nursing note reviewed.   Constitutional:        General: She is not in acute distress.     Appearance: She is well-developed. She is ill-appearing.   HENT:      Head: Normocephalic and atraumatic.      Right Ear: Hearing and external ear normal.      Left Ear: Hearing and external ear normal.      Nose: No rhinorrhea.      Right Sinus: No maxillary sinus tenderness or frontal sinus tenderness.      Left Sinus: No maxillary sinus tenderness or frontal sinus tenderness.      Mouth/Throat:      Mouth: No oral lesions.      Pharynx: Uvula midline.   Eyes:      General:         Right eye: No discharge.         Left eye: No discharge.      Conjunctiva/sclera: Conjunctivae normal.      Pupils: Pupils are equal, round, and reactive to light.   Neck:      Thyroid: No thyromegaly.      Vascular: No carotid bruit.      Trachea: No tracheal deviation.   Cardiovascular:      Rate and Rhythm: Normal rate and regular rhythm.      Pulses:           Dorsalis pedis pulses are 2+ on the right side and 2+ on the left side.      Heart sounds: Normal heart sounds, S1 normal and S2 normal. No murmur heard.  Pulmonary:      Effort: Pulmonary effort is normal. No respiratory distress.      Breath sounds: Normal breath sounds.   Abdominal:      General: Bowel sounds are decreased. There is distension.      Palpations: Abdomen is soft. There is no mass.      Tenderness: There is generalized abdominal tenderness.   Musculoskeletal:         General: Normal range of motion.      Cervical back: Normal range of motion.   Lymphadenopathy:      Cervical: No cervical adenopathy.      Upper Body:      Right upper body: No supraclavicular adenopathy.      Left upper body: No supraclavicular adenopathy.   Skin:     General: Skin is warm and dry.      Capillary Refill: Capillary refill takes less than 2 seconds.      Findings: No rash.   Neurological:      Mental Status: She is alert and oriented to person, place, and time.      Sensory: No sensory deficit.      Coordination: Coordination normal.       Gait: Gait normal.   Psychiatric:         Mood and Affect: Mood is not anxious or depressed.         Speech: Speech normal.         Behavior: Behavior normal.         Thought Content: Thought content normal.         Judgment: Judgment normal.       Significant Labs: All pertinent labs within the past 24 hours have been reviewed.  CBC:   Recent Labs   Lab 02/22/23  0632 02/23/23  0632   WBC 15.12* 15.46*   HGB 8.2* 8.6*   HCT 26.6* 27.4*    308     CMP:   Recent Labs   Lab 02/22/23  0632 02/23/23  0632 02/23/23  0948    132*  --    K 4.4 4.2  --    CL 90* 89*  --    CO2 32* 29  --    GLU 90 109  --    BUN 8 10  --    CREATININE 0.6 0.7  --    CALCIUM 7.5* 7.4*  --    PROT  --   --  5.8*   ALBUMIN  --   --  2.6*   BILITOT  --   --  0.3   ALKPHOS  --   --  154*   AST  --   --  44*   ALT  --   --  12   ANIONGAP 14 14  --        Significant Imaging: I have reviewed all pertinent imaging results/findings within the past 24 hours.

## 2023-02-23 NOTE — PLAN OF CARE
Plan of care reviewed with patient with verbal understanding. Chart and orders reviewed.  Pt remains free from falls this shift, Safety precautions in place.   Pt currently resting comfortably in bed. Pain controlled with po pain med (see emar for pain admin). Pt continues on 3L O2 which is her baseline at home.   Hourly rounding with bed in lowest position, side rails up, call light in reach.  Will continue to monitor until end of shift.       Problem: Pain Acute  Goal: Acceptable Pain Control and Functional Ability  Outcome: Ongoing, Progressing     Problem: Fall Injury Risk  Goal: Absence of Fall and Fall-Related Injury  Outcome: Ongoing, Progressing

## 2023-02-23 NOTE — SUBJECTIVE & OBJECTIVE
63 year old female with a known past medical history of anemia, arthritis, COPD, chronic respiratory failure on home oxygen at 3L NC, pneumonia, and chronic back pain who presented to the ED 2/20 with increased oxygen requirements. Of note, she was discharged from the hospital last week on Levaquin for pneumonia. Home health went to see patient today and found her oxygen saturation to be 82% on 3L.  Her flow was increased to 5L with slow improvement to low 90s. She was instructed to come to the ED for further evaluation. She reports she was tired and weak over the weekend. Workup in the ED with increasing density in right lung on CT chest. She reports cough is becoming productive. The sputum is yellow greenish color. Previously cough was not productive. Hospital medicine consulted for admission. Discussed CT findings with patient. Pulmonary was consulted for the above.    At the time of my exam in the ER, the pt is awake and alert on 3L NC. She is able to speak in full sentences and is without any specific compliants. No family at bedside.    Interval History:  2/21: did not sleep last night as she boarded in the ER, remains on 3L NC, with cough that is nonproductive   2/22: slept well overnight, on home flow 3L, with some cough but not much sputum production  2/23: remains on home flow 3L NC, pt reports some variability in her HR with exertion so has not been able to walk the halls yet, asking about breakfast    ROS complete and negative unless stated in the interval HPI    Objective:     Vital Signs (Most Recent):  Temp: 98.2 °F (36.8 °C) (02/23/23 0734)  Pulse: 73 (02/23/23 0734)  Resp: 18 (02/23/23 0734)  BP: (!) 101/59 (02/23/23 0734)  SpO2: 96 % (02/23/23 0734)   Vital Signs (24h Range):  Temp:  [97.5 °F (36.4 °C)-98.6 °F (37 °C)] 98.2 °F (36.8 °C)  Pulse:  [70-78] 73  Resp:  [17-22] 18  SpO2:  [94 %-97 %] 96 %  BP: ()/(53-63) 101/59     Weight: 61.5 kg (135 lb 9.3 oz)  Body mass index is 22.56  kg/m².      Intake/Output Summary (Last 24 hours) at 2/23/2023 0912  Last data filed at 2/23/2023 0908  Gross per 24 hour   Intake 915.83 ml   Output 4100 ml   Net -3184.17 ml       Physical Exam  Vitals reviewed.   Constitutional:       General: She is awake. She is not in acute distress.     Comments: Chronically ill appearing lady on 3L NC in NAD   Cardiovascular:      Pulses:           Radial pulses are 2+ on the right side and 2+ on the left side.   Pulmonary:      Effort: Pulmonary effort is normal.      Breath sounds: Decreased breath sounds present.      Comments: On 3L NC  Abdominal:      General: There is no distension.      Palpations: Abdomen is soft.   Musculoskeletal:      Right lower leg: No edema.      Left lower leg: No edema.   Skin:     General: Skin is warm and dry.   Neurological:      General: No focal deficit present.      Mental Status: She is alert.   Psychiatric:         Behavior: Behavior is cooperative.         Vents:  Oxygen Concentration (%): 32 (02/23/23 0712)    Lines/Drains/Airways       Peripheral Intravenous Line  Duration                  Peripheral IV - Single Lumen 02/22/23 1949 22 G Left;Posterior Hand <1 day                    Significant Labs:    CBC/Anemia Profile:  Recent Labs   Lab 02/22/23  0632 02/23/23  0632   WBC 15.12* 15.46*   HGB 8.2* 8.6*   HCT 26.6* 27.4*    308   MCV 88 87   RDW 15.3* 15.0*        Chemistries:  Recent Labs   Lab 02/22/23  0632 02/23/23  0632    132*   K 4.4 4.2   CL 90* 89*   CO2 32* 29   BUN 8 10   CREATININE 0.6 0.7   CALCIUM 7.5* 7.4*   MG 1.5* 1.4*   PHOS 3.5 3.5       All pertinent labs within the past 24 hours have been reviewed.    Significant Imaging:  I have reviewed all pertinent imaging results/findings within the past 24 hours.

## 2023-02-23 NOTE — PLAN OF CARE
OT rory completed. Pt with good strength. Unable to perform bed mobility at this time d/t increased pain and nausea. Recommends HHOT with 24/7 care.

## 2023-02-24 LAB
AFP SERPL-MCNC: 2.8 NG/ML (ref 0–8.4)
ALBUMIN SERPL BCP-MCNC: 2.3 G/DL (ref 3.5–5.2)
ALP SERPL-CCNC: 170 U/L (ref 55–135)
ALT SERPL W/O P-5'-P-CCNC: 10 U/L (ref 10–44)
ANION GAP SERPL CALC-SCNC: 11 MMOL/L (ref 8–16)
ANISOCYTOSIS BLD QL SMEAR: SLIGHT
AST SERPL-CCNC: 41 U/L (ref 10–40)
BASOPHILS NFR BLD: 0 % (ref 0–1.9)
BILIRUB SERPL-MCNC: 0.2 MG/DL (ref 0.1–1)
BUN SERPL-MCNC: 10 MG/DL (ref 8–23)
CALCIUM SERPL-MCNC: 7.4 MG/DL (ref 8.7–10.5)
CHLORIDE SERPL-SCNC: 91 MMOL/L (ref 95–110)
CO2 SERPL-SCNC: 32 MMOL/L (ref 23–29)
CREAT SERPL-MCNC: 0.8 MG/DL (ref 0.5–1.4)
DIFFERENTIAL METHOD: ABNORMAL
EOSINOPHIL NFR BLD: 2 % (ref 0–8)
ERYTHROCYTE [DISTWIDTH] IN BLOOD BY AUTOMATED COUNT: 15.2 % (ref 11.5–14.5)
EST. GFR  (NO RACE VARIABLE): >60 ML/MIN/1.73 M^2
GLUCOSE SERPL-MCNC: 88 MG/DL (ref 70–110)
HCT VFR BLD AUTO: 27.1 % (ref 37–48.5)
HGB BLD-MCNC: 8.5 G/DL (ref 12–16)
IMM GRANULOCYTES # BLD AUTO: ABNORMAL K/UL (ref 0–0.04)
IMM GRANULOCYTES NFR BLD AUTO: ABNORMAL % (ref 0–0.5)
INR PPP: 1 (ref 0.8–1.2)
LYMPHOCYTES NFR BLD: 29 % (ref 18–48)
MAGNESIUM SERPL-MCNC: 1.6 MG/DL (ref 1.6–2.6)
MCH RBC QN AUTO: 27.2 PG (ref 27–31)
MCHC RBC AUTO-ENTMCNC: 31.4 G/DL (ref 32–36)
MCV RBC AUTO: 87 FL (ref 82–98)
MONOCYTES NFR BLD: 10 % (ref 4–15)
NEUTROPHILS NFR BLD: 58 % (ref 38–73)
NEUTS BAND NFR BLD MANUAL: 1 %
NRBC BLD-RTO: 0 /100 WBC
OVALOCYTES BLD QL SMEAR: ABNORMAL
PHOSPHATE SERPL-MCNC: 3.6 MG/DL (ref 2.7–4.5)
PLATELET # BLD AUTO: 326 K/UL (ref 150–450)
PLATELET BLD QL SMEAR: ABNORMAL
PMV BLD AUTO: 9.6 FL (ref 9.2–12.9)
POIKILOCYTOSIS BLD QL SMEAR: SLIGHT
POLYCHROMASIA BLD QL SMEAR: ABNORMAL
POTASSIUM SERPL-SCNC: 4.2 MMOL/L (ref 3.5–5.1)
PROT SERPL-MCNC: 5.2 G/DL (ref 6–8.4)
PROTHROMBIN TIME: 10.7 SEC (ref 9–12.5)
RBC # BLD AUTO: 3.13 M/UL (ref 4–5.4)
SODIUM SERPL-SCNC: 134 MMOL/L (ref 136–145)
THYROGLOB AB SERPL IA-ACNC: 9.3 IU/ML (ref 0–3.9)
WBC # BLD AUTO: 16.02 K/UL (ref 3.9–12.7)

## 2023-02-24 PROCEDURE — 36415 COLL VENOUS BLD VENIPUNCTURE: CPT | Performed by: INTERNAL MEDICINE

## 2023-02-24 PROCEDURE — 63600175 PHARM REV CODE 636 W HCPCS: Performed by: INTERNAL MEDICINE

## 2023-02-24 PROCEDURE — 21400001 HC TELEMETRY ROOM

## 2023-02-24 PROCEDURE — 25000242 PHARM REV CODE 250 ALT 637 W/ HCPCS: Performed by: INTERNAL MEDICINE

## 2023-02-24 PROCEDURE — 63600175 PHARM REV CODE 636 W HCPCS: Mod: TB,JG | Performed by: NURSE PRACTITIONER

## 2023-02-24 PROCEDURE — 85610 PROTHROMBIN TIME: CPT | Performed by: INTERNAL MEDICINE

## 2023-02-24 PROCEDURE — 80053 COMPREHEN METABOLIC PANEL: CPT | Performed by: NURSE PRACTITIONER

## 2023-02-24 PROCEDURE — 94640 AIRWAY INHALATION TREATMENT: CPT

## 2023-02-24 PROCEDURE — 99223 1ST HOSP IP/OBS HIGH 75: CPT | Mod: ,,, | Performed by: INTERNAL MEDICINE

## 2023-02-24 PROCEDURE — 94799 UNLISTED PULMONARY SVC/PX: CPT

## 2023-02-24 PROCEDURE — 99223 PR INITIAL HOSPITAL CARE,LEVL III: ICD-10-PCS | Mod: ,,, | Performed by: INTERNAL MEDICINE

## 2023-02-24 PROCEDURE — 25000003 PHARM REV CODE 250: Performed by: NURSE PRACTITIONER

## 2023-02-24 PROCEDURE — 85007 BL SMEAR W/DIFF WBC COUNT: CPT | Performed by: INTERNAL MEDICINE

## 2023-02-24 PROCEDURE — 94761 N-INVAS EAR/PLS OXIMETRY MLT: CPT

## 2023-02-24 PROCEDURE — C1751 CATH, INF, PER/CENT/MIDLINE: HCPCS

## 2023-02-24 PROCEDURE — 84100 ASSAY OF PHOSPHORUS: CPT | Performed by: INTERNAL MEDICINE

## 2023-02-24 PROCEDURE — 27000221 HC OXYGEN, UP TO 24 HOURS

## 2023-02-24 PROCEDURE — 99900035 HC TECH TIME PER 15 MIN (STAT)

## 2023-02-24 PROCEDURE — 85027 COMPLETE CBC AUTOMATED: CPT | Performed by: INTERNAL MEDICINE

## 2023-02-24 PROCEDURE — 36410 VNPNXR 3YR/> PHY/QHP DX/THER: CPT

## 2023-02-24 PROCEDURE — 25000003 PHARM REV CODE 250: Performed by: INTERNAL MEDICINE

## 2023-02-24 PROCEDURE — 83735 ASSAY OF MAGNESIUM: CPT | Performed by: INTERNAL MEDICINE

## 2023-02-24 RX ADMIN — BUSPIRONE HYDROCHLORIDE 7.5 MG: 5 TABLET ORAL at 09:02

## 2023-02-24 RX ADMIN — TIZANIDINE 4 MG: 4 TABLET ORAL at 05:02

## 2023-02-24 RX ADMIN — MEROPENEM AND SODIUM CHLORIDE 500 MG: 500 INJECTION, SOLUTION INTRAVENOUS at 12:02

## 2023-02-24 RX ADMIN — DILTIAZEM HYDROCHLORIDE 120 MG: 120 CAPSULE, COATED, EXTENDED RELEASE ORAL at 09:02

## 2023-02-24 RX ADMIN — DOXYCYCLINE HYCLATE 100 MG: 100 TABLET, COATED ORAL at 09:02

## 2023-02-24 RX ADMIN — GABAPENTIN 400 MG: 400 CAPSULE ORAL at 01:02

## 2023-02-24 RX ADMIN — ALPRAZOLAM 0.5 MG: 0.5 TABLET ORAL at 05:02

## 2023-02-24 RX ADMIN — IPRATROPIUM BROMIDE 0.5 MG: 0.5 SOLUTION RESPIRATORY (INHALATION) at 01:02

## 2023-02-24 RX ADMIN — MEROPENEM AND SODIUM CHLORIDE 500 MG: 500 INJECTION, SOLUTION INTRAVENOUS at 03:02

## 2023-02-24 RX ADMIN — GABAPENTIN 400 MG: 400 CAPSULE ORAL at 05:02

## 2023-02-24 RX ADMIN — GABAPENTIN 400 MG: 400 CAPSULE ORAL at 09:02

## 2023-02-24 RX ADMIN — MONTELUKAST 10 MG: 10 TABLET, FILM COATED ORAL at 09:02

## 2023-02-24 RX ADMIN — ALPRAZOLAM 0.5 MG: 0.5 TABLET ORAL at 12:02

## 2023-02-24 RX ADMIN — OXYCODONE AND ACETAMINOPHEN 1 TABLET: 7.5; 325 TABLET ORAL at 11:02

## 2023-02-24 RX ADMIN — OXYCODONE AND ACETAMINOPHEN 1 TABLET: 7.5; 325 TABLET ORAL at 05:02

## 2023-02-24 RX ADMIN — MEROPENEM AND SODIUM CHLORIDE 500 MG: 500 INJECTION, SOLUTION INTRAVENOUS at 09:02

## 2023-02-24 RX ADMIN — ARFORMOTEROL TARTRATE 15 MCG: 15 SOLUTION RESPIRATORY (INHALATION) at 07:02

## 2023-02-24 RX ADMIN — CALCITRIOL CAPSULES 0.25 MCG 0.25 MCG: 0.25 CAPSULE ORAL at 09:02

## 2023-02-24 RX ADMIN — OXYCODONE AND ACETAMINOPHEN 1 TABLET: 7.5; 325 TABLET ORAL at 12:02

## 2023-02-24 RX ADMIN — ENOXAPARIN SODIUM 40 MG: 40 INJECTION SUBCUTANEOUS at 05:02

## 2023-02-24 RX ADMIN — IPRATROPIUM BROMIDE 0.5 MG: 0.5 SOLUTION RESPIRATORY (INHALATION) at 07:02

## 2023-02-24 RX ADMIN — TIZANIDINE 4 MG: 4 TABLET ORAL at 01:02

## 2023-02-24 RX ADMIN — BUDESONIDE 0.5 MG: 0.5 INHALANT ORAL at 07:02

## 2023-02-24 RX ADMIN — LEVOTHYROXINE SODIUM 125 MCG: 125 TABLET ORAL at 05:02

## 2023-02-24 RX ADMIN — SENNOSIDES AND DOCUSATE SODIUM 1 TABLET: 50; 8.6 TABLET ORAL at 09:02

## 2023-02-24 RX ADMIN — TIZANIDINE 4 MG: 4 TABLET ORAL at 09:02

## 2023-02-24 NOTE — SUBJECTIVE & OBJECTIVE
63 year old female with a known past medical history of anemia, arthritis, COPD, chronic respiratory failure on home oxygen at 3L NC, pneumonia, and chronic back pain who presented to the ED 2/20 with increased oxygen requirements. Of note, she was discharged from the hospital last week on Levaquin for pneumonia. Home health went to see patient today and found her oxygen saturation to be 82% on 3L.  Her flow was increased to 5L with slow improvement to low 90s. She was instructed to come to the ED for further evaluation. She reports she was tired and weak over the weekend. Workup in the ED with increasing density in right lung on CT chest. She reports cough is becoming productive. The sputum is yellow greenish color. Previously cough was not productive. Hospital medicine consulted for admission. Discussed CT findings with patient. Pulmonary was consulted for the above.    At the time of my exam in the ER, the pt is awake and alert on 3L NC. She is able to speak in full sentences and is without any specific compliants. No family at bedside.    Interval History:  2/21: did not sleep last night as she boarded in the ER, remains on 3L NC, with cough that is nonproductive   2/22: slept well overnight, on home flow 3L, with some cough but not much sputum production  2/23: remains on home flow 3L NC, pt reports some variability in her HR with exertion so has not been able to walk the halls yet, asking about breakfast  2/24: on home flow 3L NC, pt sleeping but awakens easily to voice,  at bedside, resp status stable, plans for liver bx s/t US/CT findings     ROS  complete and negative unless stated in the interval HPI    Objective:     Vital Signs (Most Recent):  Temp: 98.2 °F (36.8 °C) (02/24/23 0738)  Pulse: 74 (02/24/23 0738)  Resp: 14 (02/24/23 0738)  BP: 101/61 (02/24/23 0738)  SpO2: 96 % (02/24/23 0738) Vital Signs (24h Range):  Temp:  [97.8 °F (36.6 °C)-98.2 °F (36.8 °C)] 98.2 °F (36.8 °C)  Pulse:  []  74  Resp:  [14-18] 14  SpO2:  [92 %-96 %] 96 %  BP: ()/(58-68) 101/61     Weight: 64.7 kg (142 lb 10.2 oz)  Body mass index is 23.74 kg/m².      Intake/Output Summary (Last 24 hours) at 2/24/2023 0843  Last data filed at 2/24/2023 0614  Gross per 24 hour   Intake 922.16 ml   Output 2300 ml   Net -1377.84 ml       Physical Exam  Vitals reviewed.   Constitutional:       General: She is sleeping. She is not in acute distress.     Comments: Appears chronically ill on 3L NC   Cardiovascular:      Rate and Rhythm: Normal rate.      Pulses:           Radial pulses are 2+ on the right side and 2+ on the left side.   Pulmonary:      Effort: Pulmonary effort is normal.      Breath sounds: Normal breath sounds.      Comments: On home flow 3L NC  Abdominal:      General: There is no distension.      Palpations: Abdomen is soft.   Musculoskeletal:      Right lower leg: No edema.      Left lower leg: No edema.   Skin:     General: Skin is warm and dry.   Neurological:      General: No focal deficit present.      Mental Status: She is easily aroused.   Psychiatric:         Behavior: Behavior is cooperative.         Vents:  Oxygen Concentration (%): 32 (02/24/23 0725)    Lines/Drains/Airways       None                   Significant Labs:    CBC/Anemia Profile:  Recent Labs   Lab 02/23/23  0632 02/24/23  0626   WBC 15.46* 16.02*   HGB 8.6* 8.5*   HCT 27.4* 27.1*    326   MCV 87 87   RDW 15.0* 15.2*        Chemistries:  Recent Labs   Lab 02/23/23  0632 02/23/23  0948 02/24/23  0626   *  --  134*   K 4.2  --  4.2   CL 89*  --  91*   CO2 29  --  32*   BUN 10  --  10   CREATININE 0.7  --  0.8   CALCIUM 7.4*  --  7.4*   ALBUMIN  --  2.6* 2.3*   PROT  --  5.8* 5.2*   BILITOT  --  0.3 0.2   ALKPHOS  --  154* 170*   ALT  --  12 10   AST  --  44* 41*   MG 1.4*  --  1.6   PHOS 3.5  --  3.6       All pertinent labs within the past 24 hours have been reviewed.    Significant Imaging:  I have reviewed all pertinent imaging  results/findings within the past 24 hours.

## 2023-02-24 NOTE — PROGRESS NOTES
[facility] Surg  Pulmonology Progress Note    Patient Name: Christine Horner  MRN: [id number]  Admission Date: 2/20/2023  Hospital Length of Stay: 4 days  Code Status: Full Code  Attending Provider: Ramon Tenorio, *  Primary Care Provider: Eloy Hdez MD   Principal Problem: Pneumonia involving right lung    Subjective:     63 year old female with a known past medical history of anemia, arthritis, COPD, chronic respiratory failure on home oxygen at 3L NC, pneumonia, and chronic back pain who presented to the ED 2/20 with increased oxygen requirements. Of note, she was discharged from the hospital last week on Levaquin for pneumonia. Home health went to see patient today and found her oxygen saturation to be 82% on 3L.  Her flow was increased to 5L with slow improvement to low 90s. She was instructed to come to the ED for further evaluation. She reports she was tired and weak over the weekend. Workup in the ED with increasing density in right lung on CT chest. She reports cough is becoming productive. The sputum is yellow greenish color. Previously cough was not productive. Hospital medicine consulted for admission. Discussed CT findings with patient. Pulmonary was consulted for the above.    At the time of my exam in the ER, the pt is awake and alert on 3L NC. She is able to speak in full sentences and is without any specific compliants. No family at bedside.    Interval History:  2/21: did not sleep last night as she boarded in the ER, remains on 3L NC, with cough that is nonproductive   2/22: slept well overnight, on home flow 3L, with some cough but not much sputum production  2/23: remains on home flow 3L NC, pt reports some variability in her HR with exertion so has not been able to walk the halls yet, asking about breakfast  2/24: on home flow 3L NC, pt sleeping but awakens easily to voice,  at bedside, resp status stable, plans for liver bx s/t US/CT findings     ROS  complete and  negative unless stated in the interval HPI    Objective:     Vital Signs (Most Recent):  Temp: 98.2 °F (36.8 °C) (02/24/23 0738)  Pulse: 74 (02/24/23 0738)  Resp: 14 (02/24/23 0738)  BP: 101/61 (02/24/23 0738)  SpO2: 96 % (02/24/23 0738) Vital Signs (24h Range):  Temp:  [97.8 °F (36.6 °C)-98.2 °F (36.8 °C)] 98.2 °F (36.8 °C)  Pulse:  [] 74  Resp:  [14-18] 14  SpO2:  [92 %-96 %] 96 %  BP: ()/(58-68) 101/61     Weight: 64.7 kg (142 lb 10.2 oz)  Body mass index is 23.74 kg/m².      Intake/Output Summary (Last 24 hours) at 2/24/2023 0843  Last data filed at 2/24/2023 0614  Gross per 24 hour   Intake 922.16 ml   Output 2300 ml   Net -1377.84 ml       Physical Exam  Vitals reviewed.   Constitutional:       General: She is sleeping. She is not in acute distress.     Comments: Appears chronically ill on 3L NC   Cardiovascular:      Rate and Rhythm: Normal rate.      Pulses:           Radial pulses are 2+ on the right side and 2+ on the left side.   Pulmonary:      Effort: Pulmonary effort is normal.      Breath sounds: Normal breath sounds.      Comments: On home flow 3L NC  Abdominal:      General: There is no distension.      Palpations: Abdomen is soft.   Musculoskeletal:      Right lower leg: No edema.      Left lower leg: No edema.   Skin:     General: Skin is warm and dry.   Neurological:      General: No focal deficit present.      Mental Status: She is easily aroused.   Psychiatric:         Behavior: Behavior is cooperative.         Vents:  Oxygen Concentration (%): 32 (02/24/23 0725)    Lines/Drains/Airways       None                   Significant Labs:    CBC/Anemia Profile:  Recent Labs   Lab 02/23/23  0632 02/24/23  0626   WBC 15.46* 16.02*   HGB 8.6* 8.5*   HCT 27.4* 27.1*    326   MCV 87 87   RDW 15.0* 15.2*        Chemistries:  Recent Labs   Lab 02/23/23  0632 02/23/23  0948 02/24/23  0626   *  --  134*   K 4.2  --  4.2   CL 89*  --  91*   CO2 29  --  32*   BUN 10  --  10   CREATININE  0.7  --  0.8   CALCIUM 7.4*  --  7.4*   ALBUMIN  --  2.6* 2.3*   PROT  --  5.8* 5.2*   BILITOT  --  0.3 0.2   ALKPHOS  --  154* 170*   ALT  --  12 10   AST  --  44* 41*   MG 1.4*  --  1.6   PHOS 3.5  --  3.6       All pertinent labs within the past 24 hours have been reviewed.    Significant Imaging:  I have reviewed all pertinent imaging results/findings within the past 24 hours.      ABG  Recent Labs   Lab 02/20/23  1430   PH 7.425   PO2 72*   PCO2 60.1*   HCO3 39.5*   BE 15     Assessment/Plan:     Pulmonary  * Pneumonia involving right lung  - previous pseudomonas pneumonia in Nov 2022, admitted earlier this month for pneumonia and was discharge on levaquin, pt returned to the ER 2/20 for increased O2 needs per her home ritika team   - remains on 3L NC which is her baseline  - remains on merrem, complete course   - PT, IS, ambulate, OOB    Acute on chronic respiratory failure with hypoxia  Patient with Hypoxic Respiratory failure which is Acute on chronic.  she is on home oxygen at 3 LPM. Supplemental oxygen was provided and noted- Oxygen Concentration (%):  [32] 32.   Signs/symptoms of respiratory failure include- worseing hypoxia on home flow O2. Contributing diagnoses includes - Pneumonia Labs and images were reviewed. Patient Has not had a recent ABG. Will treat underlying causes and adjust management of respiratory failure as follows    - see plan for pneumonia       Pulmonary status remains stable on home flow of 3 L nasal cannula. Further workup for liver Mets and management per primary team. Pulmonary team will remain available during hospitalization. Please call if we can be of assistance sooner or if questions should arise.       Fly Kahn, NP  Pulmonology  [facility] - Med [facility]

## 2023-02-24 NOTE — ASSESSMENT & PLAN NOTE
- previous pseudomonas pneumonia in Nov 2022, admitted earlier this month for pneumonia and was discharge on levaquin, pt returned to the ER 2/20 for increased O2 needs per her home ritika team   - remains on 3L NC which is her baseline  - remains on merrem, complete course   - PT, IS, ambulate, OOB

## 2023-02-24 NOTE — ASSESSMENT & PLAN NOTE
Patient with Hypercapnic and Hypoxic Respiratory failure which is Acute on chronic.  she is on home oxygen at 3 LPM. Supplemental oxygen was provided and noted- Oxygen Concentration (%):  [32] 32.   Signs/symptoms of respiratory failure include- tachypnea, increased work of breathing and lethargy. Contributing diagnoses includes - COPD, Pleural effusion and Pneumonia Labs and images were reviewed. Patient Has recent ABG, which has been reviewed. Will treat underlying causes and adjust management of respiratory failure as follows- IV antibiotics, supplemental oxygen, prn breathing treatments  -Pulmonology following

## 2023-02-24 NOTE — ASSESSMENT & PLAN NOTE
Increasing abdominal distension, ordered Abdominal US: multiple liver masses, ordered CT Liver: Multiple large hypoenhancing masses in the liver, largest on the right measuring 12 x 10 cm, largest on the left measuring 2.2 x 1.9 cm, compatible with metastatic disease. Hx of Thyroid Cancer    --Thyroglobulin Ab, AFP- to evaluate  -IR consulted for biopsy- procedure canceled secondary to patient eating- to be rescheduled Monday or outpatient  -Consult Hem/Onc

## 2023-02-24 NOTE — ASSESSMENT & PLAN NOTE
Thyroidectomy: 1/2020, Radiation ablation: 11/2020, managed by Dr. Prieto at [facility]    --Continue levothyroxine

## 2023-02-24 NOTE — PT/OT/SLP PROGRESS
Physical Therapy  Treatment    Christine Horner   MRN: [id number]   Admitting Diagnosis: Pneumonia involving right lung       PT Start Time: 1415     PT Stop Time: 1430    PT Total Time (min): 15 min       Billable Minutes:  Therapeutic Exercise 15    Treatment Type: Treatment  PT/PTA: PT     PTA Visit Number: 0       General Precautions: Standard, fall  Orthopedic Precautions: N/A  Braces: N/A  Respiratory Status: Nasal cannula    Spiritual, Cultural Beliefs, Hoahaoism Practices, Values that Affect Care: no    Subjective:  Communicated with nursing (Theodora GARDNER) and performed chart review prior to session.  Pt agreeable to supine therex    Pain/Comfort  Pain Rating 1:  (pain/discomfort to abdomen and back. pt also complained of nausea; nsg provided meds)    Objective:   Patient found with: telemetry, oxygen, peripheral IV, PureWick      Treatment and Education:  Educated pt on benefits of consistent participation in PT services to meet functional goals. Educated pt on supine therex to promote strength and joint mobility. Pt reported that she recalls the exercises and is able to perform them intermittently throughout the day. Educated pt on importance of sitting EOB/OOB to promote endurance and overall activity tolerance, unwilling today due to pain/discomfort and upcoming CT. Educated pt on call don't fall policy and use of call button to alert nursing staff of needs.   Pt expressed understanding.     AM-PAC 6 CLICK MOBILITY  How much help from another person does this patient currently need?   1 = Unable, Total/Dependent Assistance  2 = A lot, Maximum/Moderate Assistance  3 = A little, Minimum/Contact Guard/Supervision  4 = None, Modified [place]/Independent    Turning over in bed (including adjusting bedclothes, sheets and blankets)?: 1  Sitting down on and standing up from a chair with arms (e.g., wheelchair, bedside commode, etc.): 1  Moving from lying on back to sitting on the side of the bed?: 1  Moving to  and from a bed to a chair (including a wheelchair)?: 1  Need to walk in hospital room?: 1  Climbing 3-5 steps with a railing?: 1  Basic Mobility Total Score: 6    AM-PAC Raw Score CMS G-Code Modifier Level of Impairment Assistance   6 % Total / Unable   7 - 9 CM 80 - 100% Maximal Assist   10 - 14 CL 60 - 80% Moderate Assist   15 - 19 CK 40 - 60% Moderate Assist   20 - 22 CJ 20 - 40% Minimal Assist   23 CI 1-20% SBA / CGA   24 CH 0% Independent/ Mod I     Patient left supine with all lines intact, call button in reach, nursing notified, and RT present.    Assessment:  Christine Horner is a 63 y.o. female with a medical diagnosis of Pneumonia involving right lung and presents with the impairments listed below which negatively impact functional status. Pt will benefit from continued PT services to address deficits and progress toward baseline.    Rehab identified problem list/impairments: weakness, impaired endurance, impaired functional mobility, gait instability, decreased safety awareness, decreased lower extremity function, pain    Rehab potential is fair.    Activity tolerance: Fair    Discharge recommendations:  (TBD)      Barriers to discharge:      Equipment recommendations: none     GOALS:   Multidisciplinary Problems       Physical Therapy Goals          Problem: Physical Therapy    Goal Priority Disciplines Outcome Goal Variances Interventions   Physical Therapy Goal     PT, PT/OT Ongoing, Progressing     Description: Goals to be met by 3/7/23  Pt will complete bed mobility MOD I.  Pt will complete sit to stand MOD I.  Pt will ambulate 150ft MOD I using RW.                       PLAN:    Patient to be seen 3 x/week to address the above listed problems via gait training, therapeutic activities, therapeutic exercises, neuromuscular re-education  Plan of Care expires: 03/07/23  Plan of Care reviewed with: patient         02/23/2023

## 2023-02-24 NOTE — PROGRESS NOTES
[facility] Medicine  Progress Note    Patient Name: Christine Horner  MRN: [id number]  Patient Class: IP- Inpatient   Admission Date: 2/20/2023  Length of Stay: 4 days  Attending Physician: Ramon Tenorio, *  Primary Care Provider: Eloy Hdez MD        Subjective:     Principal Problem:Pneumonia involving right lung        HPI:  The patient is a 62 yo female with past medical history of anemia, arthritis, COPD, chronic respiratory failure on home oxygen, pneumonia and chronic back pain who presented to the ED with increased oxygen requirements. She is usually on 3L nasal cannula. She was discharged from the hospital last week on Levaquin for pneumonia. Home health went to see patient today and found her oxygen saturation to be 82% on 3L.  Her flow was increased to 5L with slow improvement to low 90s. She was instructed to come to the ED for further evaluation. She reports she was tired and weak over the weekend. Workup in the ED with increasing density in right lung on CT chest. She reports cough is becoming productive. The sputum is yellow greenish color. Previously cough was not productive.Hospital medicine consulted for admission. Discussed CT findings with patient.       Overview/Hospital Course:  63 year old female, under the management of Hospital Medicine for pneumonia, acute on chronic respiratory failure. Patient O2 requirements decreased overnight, currently at baseline with 3L NC, SpO2 stable: 95%. Currently treated with Doxycycline and Merrem due to Hx of respiratory pseudomonas (11/22). Sputum culture ordered, patient has not been able to produce sputum for culture, has been given 3% saline nebs to assist without success. BC: NGTD. PT/OT ordered. Patient with significant abdominal distension, KUB non-contributory. Abdominal US: concerning for liver masses. Ordered triple-phase CT of Liver: Multiple large hypoenhancing masses in the liver, largest on the right measuring 12 x  10 cm, largest on the left measuring 2.2 x 1.9 cm, compatible with metastatic disease. Ordered AFP, consulted IR for possible Biopsy, consulted Hem/Onc. Discussed with patient, has Hx of Thyroid Cancer (2020), treated with thyroidectomy 1/2020, I-131 11/2020. Ordered Thyroglobulin Ab, AFP. On 2/24/23, IR consulted for biopsy, however canceled secondary to the patient eating breakfast prior to procedure.  Hematology-Oncology consult-awaiting recommendations.  Leukocytosis persists with no growth to date from blood cultures. PT/OT evaluations completed with home health recommended.  Social work consulted to establish home health prior to discharge.  Patient remained stable on 3 L NC (home dose).       Interval History:  Patient in bed upon assessment.  Biopsy to be postponed secondary to patient eating breakfast prior to procedure.  Discharge planning initiated with Home health established.   consulted.  Patient states she is afraid to discharge to home and fears she will become dizzy and need to return immediately if sent home.  Plan of care discussed with patient/family at bedside with understanding verbalized.    Review of Systems   Constitutional:  Positive for activity change and fatigue. Negative for appetite change, chills, diaphoresis, fever and unexpected weight change.   HENT:  Negative for congestion, hearing loss, mouth sores, postnasal drip, rhinorrhea, sore throat and trouble swallowing.    Eyes:  Negative for discharge and visual disturbance.   Respiratory:  Positive for cough and shortness of breath. Negative for chest tightness.    Cardiovascular:  Negative for chest pain, palpitations and leg swelling.   Gastrointestinal:  Negative for blood in stool, constipation, diarrhea, nausea and vomiting.   Endocrine: Negative for cold intolerance and heat intolerance.   Genitourinary:  Negative for difficulty urinating, dyspareunia, flank pain and hematuria.   Musculoskeletal:  Negative for  arthralgias, back pain and myalgias.   Skin: Negative.    Neurological:  Positive for dizziness and weakness. Negative for light-headedness and headaches.   Hematological:  Negative for adenopathy. Does not bruise/bleed easily.   Psychiatric/Behavioral:  Negative for agitation, behavioral problems and confusion. The patient is nervous/anxious.    Objective:     Vital Signs (Most Recent):  Temp: 97.7 °F (36.5 °C) (02/24/23 1215)  Pulse: 85 (02/24/23 1331)  Resp: 18 (02/24/23 1331)  BP: 117/61 (02/24/23 1215)  SpO2: 95 % (02/24/23 1331) Vital Signs (24h Range):  Temp:  [97.7 °F (36.5 °C)-98.2 °F (36.8 °C)] 97.7 °F (36.5 °C)  Pulse:  [] 85  Resp:  [14-19] 18  SpO2:  [92 %-96 %] 95 %  BP: ()/(58-68) 117/61     Weight: 64.7 kg (142 lb 10.2 oz)  Body mass index is 23.74 kg/m².    Intake/Output Summary (Last 24 hours) at 2/24/2023 1442  Last data filed at 2/24/2023 1221  Gross per 24 hour   Intake 809.16 ml   Output 2900 ml   Net -2090.84 ml      Physical Exam  Vitals reviewed.   Constitutional:       General: She is sleeping. She is not in acute distress.     Interventions: Nasal cannula in place.      Comments: Appears chronically ill on 3L NC   HENT:      Nose: Nose normal.      Mouth/Throat:      Pharynx: Oropharynx is clear.   Cardiovascular:      Rate and Rhythm: Normal rate.      Pulses:           Radial pulses are 2+ on the right side and 2+ on the left side.   Pulmonary:      Effort: Pulmonary effort is normal.      Breath sounds: Normal breath sounds.      Comments: On home flow 3L NC  Abdominal:      General: Bowel sounds are normal. There is no distension.      Palpations: Abdomen is soft.      Tenderness: There is no abdominal tenderness.   Genitourinary:     Comments: Deferred  Musculoskeletal:         General: Normal range of motion.      Cervical back: Normal range of motion.      Right lower leg: No edema.      Left lower leg: No edema.   Skin:     General: Skin is warm and dry.   Neurological:       General: No focal deficit present.      Mental Status: She is oriented to person, place, and time and easily aroused.   Psychiatric:         Mood and Affect: Mood is anxious.         Behavior: Behavior normal. Behavior is cooperative.       Significant Labs: All pertinent labs within the past 24 hours have been reviewed.  CBC:   Recent Labs   Lab 02/23/23  0632 02/24/23  0626   WBC 15.46* 16.02*   HGB 8.6* 8.5*   HCT 27.4* 27.1*    326     CMP:   Recent Labs   Lab 02/23/23  0632 02/23/23  0948 02/24/23  0626   *  --  134*   K 4.2  --  4.2   CL 89*  --  91*   CO2 29  --  32*     --  88   BUN 10  --  10   CREATININE 0.7  --  0.8   CALCIUM 7.4*  --  7.4*   PROT  --  5.8* 5.2*   ALBUMIN  --  2.6* 2.3*   BILITOT  --  0.3 0.2   ALKPHOS  --  154* 170*   AST  --  44* 41*   ALT  --  12 10   ANIONGAP 14  --  11       Significant Imaging: I have reviewed all pertinent imaging results/findings within the past 24 hours.      Assessment/Plan:      * Pneumonia involving right lung  Previously treated for Pseudomonal pneumonia  Continue meropenem and doxycycline  f/u sputum culture- unable to collect  Appreciate pulmonology    Liver mass  Increasing abdominal distension, ordered Abdominal US: multiple liver masses, ordered CT Liver: Multiple large hypoenhancing masses in the liver, largest on the right measuring 12 x 10 cm, largest on the left measuring 2.2 x 1.9 cm, compatible with metastatic disease. Hx of Thyroid Cancer    --Thyroglobulin Ab, AFP- to evaluate  -IR consulted for biopsy- procedure canceled secondary to patient eating- to be rescheduled Monday or outpatient  -Consult Hem/Onc      Papillary thyroid carcinoma  Thyroidectomy: 1/2020, Radiation ablation: 11/2020, managed by Dr. Prieto at [facility]    --Continue levothyroxine      CLL (chronic lymphocytic leukemia)  Followed by outside heme-onc        Postablative hypothyroidism  Hx of thyroid cancer, surgical removal 1/2020, I-131 11/2020, followed  by Dr. Feliciano at [facility].       --Continue levothyroxine  --Thyroglobulin Ab      Paroxysmal SVT (supraventricular tachycardia)  Continue home medications  Heart rate currently controlled      Acute on chronic respiratory failure with hypoxia  Patient with Hypercapnic and Hypoxic Respiratory failure which is Acute on chronic.  she is on home oxygen at 3 LPM. Supplemental oxygen was provided and noted- Oxygen Concentration (%):  [32] 32.   Signs/symptoms of respiratory failure include- tachypnea, increased work of breathing and lethargy. Contributing diagnoses includes - COPD, Pleural effusion and Pneumonia Labs and images were reviewed. Patient Has recent ABG, which has been reviewed. Will treat underlying causes and adjust management of respiratory failure as follows- IV antibiotics, supplemental oxygen, prn breathing treatments  -Pulmonology following      VTE Risk Mitigation (From admission, onward)         Ordered     enoxaparin injection 40 mg  Daily         02/20/23 1656     IP VTE HIGH RISK PATIENT  Once         02/20/23 1656     Place sequential compression device  Until discontinued         02/20/23 1656                Discharge Planning   MARK:      Code Status: Full Code   Is the patient medically ready for discharge?:     Reason for patient still in hospital (select all that apply): Patient trending condition, Laboratory test, Treatment, Imaging and Consult recommendations  Discharge Plan A: Home Health   Discharge Delays: None known at this time              Mallory Talbert NP  Department of Hospital Medicine   [facility] - Med [facility]

## 2023-02-24 NOTE — PLAN OF CARE
Patient resting in bed, remains free of falls and injuries this shift. VSS. No obvious s/sx of distress noted. Pain managed with PRN medications. Midline placed with no complications, flushing well. Updated POC regarding biopsy reviewed with the patient, verbalized understanding. No further needs at this time, call light within reach. Continue POC as ordered, chart check completed and all orders reviewed.

## 2023-02-24 NOTE — SUBJECTIVE & OBJECTIVE
Interval History:  Patient in bed upon assessment.  Biopsy to be postponed secondary to patient eating breakfast prior to procedure.  Discharge planning initiated with Home health established.   consulted.  Patient states she is afraid to discharge to home and fears she will become dizzy and need to return immediately if sent home.  Plan of care discussed with patient/family at bedside with understanding verbalized.    Review of Systems   Constitutional:  Positive for activity change and fatigue. Negative for appetite change, chills, diaphoresis, fever and unexpected weight change.   HENT:  Negative for congestion, hearing loss, mouth sores, postnasal drip, rhinorrhea, sore throat and trouble swallowing.    Eyes:  Negative for discharge and visual disturbance.   Respiratory:  Positive for cough and shortness of breath. Negative for chest tightness.    Cardiovascular:  Negative for chest pain, palpitations and leg swelling.   Gastrointestinal:  Negative for blood in stool, constipation, diarrhea, nausea and vomiting.   Endocrine: Negative for cold intolerance and heat intolerance.   Genitourinary:  Negative for difficulty urinating, dyspareunia, flank pain and hematuria.   Musculoskeletal:  Negative for arthralgias, back pain and myalgias.   Skin: Negative.    Neurological:  Positive for dizziness and weakness. Negative for light-headedness and headaches.   Hematological:  Negative for adenopathy. Does not bruise/bleed easily.   Psychiatric/Behavioral:  Negative for agitation, behavioral problems and confusion. The patient is nervous/anxious.    Objective:     Vital Signs (Most Recent):  Temp: 97.7 °F (36.5 °C) (02/24/23 1215)  Pulse: 85 (02/24/23 1331)  Resp: 18 (02/24/23 1331)  BP: 117/61 (02/24/23 1215)  SpO2: 95 % (02/24/23 1331) Vital Signs (24h Range):  Temp:  [97.7 °F (36.5 °C)-98.2 °F (36.8 °C)] 97.7 °F (36.5 °C)  Pulse:  [] 85  Resp:  [14-19] 18  SpO2:  [92 %-96 %] 95 %  BP:  ()/(58-68) 117/61     Weight: 64.7 kg (142 lb 10.2 oz)  Body mass index is 23.74 kg/m².    Intake/Output Summary (Last 24 hours) at 2/24/2023 1442  Last data filed at 2/24/2023 1221  Gross per 24 hour   Intake 809.16 ml   Output 2900 ml   Net -2090.84 ml      Physical Exam  Vitals reviewed.   Constitutional:       General: She is sleeping. She is not in acute distress.     Interventions: Nasal cannula in place.      Comments: Appears chronically ill on 3L NC   HENT:      Nose: Nose normal.      Mouth/Throat:      Pharynx: Oropharynx is clear.   Cardiovascular:      Rate and Rhythm: Normal rate.      Pulses:           Radial pulses are 2+ on the right side and 2+ on the left side.   Pulmonary:      Effort: Pulmonary effort is normal.      Breath sounds: Normal breath sounds.      Comments: On home flow 3L NC  Abdominal:      General: Bowel sounds are normal. There is no distension.      Palpations: Abdomen is soft.      Tenderness: There is no abdominal tenderness.   Genitourinary:     Comments: Deferred  Musculoskeletal:         General: Normal range of motion.      Cervical back: Normal range of motion.      Right lower leg: No edema.      Left lower leg: No edema.   Skin:     General: Skin is warm and dry.   Neurological:      General: No focal deficit present.      Mental Status: She is oriented to person, place, and time and easily aroused.   Psychiatric:         Mood and Affect: Mood is anxious.         Behavior: Behavior normal. Behavior is cooperative.       Significant Labs: All pertinent labs within the past 24 hours have been reviewed.  CBC:   Recent Labs   Lab 02/23/23  0632 02/24/23  0626   WBC 15.46* 16.02*   HGB 8.6* 8.5*   HCT 27.4* 27.1*    326     CMP:   Recent Labs   Lab 02/23/23  0632 02/23/23  0948 02/24/23  0626   *  --  134*   K 4.2  --  4.2   CL 89*  --  91*   CO2 29  --  32*     --  88   BUN 10  --  10   CREATININE 0.7  --  0.8   CALCIUM 7.4*  --  7.4*   PROT  --   5.8* 5.2*   ALBUMIN  --  2.6* 2.3*   BILITOT  --  0.3 0.2   ALKPHOS  --  154* 170*   AST  --  44* 41*   ALT  --  12 10   ANIONGAP 14  --  11       Significant Imaging: I have reviewed all pertinent imaging results/findings within the past 24 hours.

## 2023-02-24 NOTE — CONSULTS
Chart reviewed by Dr. Ortiz.       ASSESSMENT/PLAN:    Liver mass    The order for a Biopsy has been placed and the procedure will be performed asap.          Thank you for the consult.

## 2023-02-24 NOTE — CONSULTS
[facility]  Hematology/Oncology  Consult Note    Patient Name: Christine Horner  MRN: [id number]  Admission Date: 2/20/2023  Hospital Length of Stay: 4 days  Code Status: Full Code   Attending Provider: Ramon Tenorio, *  Consulting Provider: Ellyn Gonzalez MD  Primary Care Physician: Eloy Hdez MD  Principal Problem:Pneumonia involving right lung    Inpatient consult to [facility] Oncology  Consult performed by: Ellyn Gonzalez MD  Consult ordered by: Ellyn Gonzalez MD  Reason for consult: Liver masses  Assessment/Recommendations:     Liver masses: Incidentally found on imaging for respiratory symptoms revealing liver abnormality follow-up with CT abdomen pelvis showing several hypoenhancing lesions in liver largest 12 cm right lobe.  Workup thus far has included AFP normal.  History notable for thyroid cancer status post thyroidectomy and radioactive iodine ablation as well as CLL followed by oncologist Dr. Yanez at [facility].  Agree with biopsy liver lesion with Interventional Radiology and follow-up with oncology as outpatient with pathology results.    Pneumonia:  Management per hospital medicine/pulmonary    CLL:  Manage as outpatient with [facility] oncologist follow-up as outpatient.  No concerning constitutional symptoms at this time or evidence on lab work of exacerbation.    History of thyroid cancer:  Elevated thyroglobulin antibody 9.3, thyroglobulin pending.  Follows with outside endocrinologist.            Subjective:     HPI:  Ms. Horner is a 63-year-old woman with history of papillary thyroid carcinoma 2020, CLL, COPD presenting with acute on chronic respiratory failure increasing shortness of breath at home and supplemental oxygen use from 3 L at baseline to 5 L. CT imaging revealing right upper lung opacity.  Cough sputum present    Oncology Treatment Plan:   [No matching plan found]    Medications:  Continuous Infusions:  Scheduled Meds:    arformoteroL  15 mcg Nebulization BID    budesonide  0.5 mg Nebulization Q12H    busPIRone  7.5 mg Oral BID    calcitRIOL  0.25 mcg Oral Daily    diltiaZEM  120 mg Oral BID    doxycycline  100 mg Oral Q12H    enoxaparin  40 mg Subcutaneous Daily    gabapentin  400 mg Oral QID    ipratropium  0.5 mg Nebulization Q6H WAKE    levothyroxine  125 mcg Oral Before breakfast    meropenem (MERREM) IVPB  500 mg Intravenous Q6H    montelukast  10 mg Oral Daily    polyethylene glycol  17 g Oral Daily    senna-docusate 8.6-50 mg  1 tablet Oral Daily     PRN Meds:ALPRAZolam, artificial tears, levalbuterol, ondansetron, oxyCODONE-acetaminophen, sodium chloride 0.9%, tiZANidine     Review of patient's allergies indicates:   Allergen Reactions    Ciprofloxacin Itching and Rash    Nitrofurantoin monohyd/m-cryst Hives    Penicillins Hives     Patient broke out in hives. Patient also said she had hives in her throat        Past Medical History:   Diagnosis Date    Anemia 2023    Arthritis     Chronic back pain     COPD (chronic obstructive pulmonary disease)     COPD with acute exacerbation 2016    Pneumonia     SOB (shortness of breath)      Past Surgical History:   Procedure Laterality Date    BACK SURGERY       SECTION      TONSILLECTOMY      WISDOM TOOTH EXTRACTION       Family History       Problem Relation (Age of Onset)    Cancer Father    Diabetes Maternal Grandmother          Tobacco Use    Smoking status: Former     Packs/day: 1.00     Years: 30.00     Pack years: 30.00     Types: Cigarettes     Quit date: 1/3/2012     Years since quittin.1    Smokeless tobacco: Not on file   Substance and Sexual Activity    Alcohol use: No    Drug use: No    Sexual activity: Never     Birth control/protection: None       Review of Systems   Constitutional:  Positive for fatigue. Negative for activity change, appetite change, chills, diaphoresis, fever and unexpected weight change.   HENT:  Negative for congestion,  rhinorrhea and sinus pain.    Respiratory:  Positive for shortness of breath. Negative for cough, choking, chest tightness, wheezing and stridor.    Cardiovascular:  Negative for chest pain, palpitations and leg swelling.   Gastrointestinal:  Negative for abdominal distention, abdominal pain, anal bleeding, blood in stool, constipation, diarrhea, nausea, rectal pain and vomiting.   Skin:  Negative for rash.   Hematological:  Does not bruise/bleed easily.   Psychiatric/Behavioral: Negative.     Objective:     Vital Signs (Most Recent):  Temp: 97.8 °F (36.6 °C) (02/24/23 1608)  Pulse: 86 (02/24/23 1608)  Resp: 16 (02/24/23 1733)  BP: 118/62 (02/24/23 1608)  SpO2: 96 % (02/24/23 1608) Vital Signs (24h Range):  Temp:  [97.7 °F (36.5 °C)-98.2 °F (36.8 °C)] 97.8 °F (36.6 °C)  Pulse:  [] 86  Resp:  [14-19] 16  SpO2:  [92 %-96 %] 96 %  BP: ()/(58-63) 118/62     Weight: 64.7 kg (142 lb 10.2 oz)  Body mass index is 23.74 kg/m².  Body surface area is 1.72 meters squared.      Intake/Output Summary (Last 24 hours) at 2/24/2023 1743  Last data filed at 2/24/2023 1738  Gross per 24 hour   Intake 809.16 ml   Output 2800 ml   Net -1990.84 ml       Physical Exam  Vitals reviewed.   Constitutional:       General: She is not in acute distress.     Appearance: Normal appearance.   HENT:      Head: Normocephalic and atraumatic.      Nose: No congestion.      Mouth/Throat:      Mouth: Mucous membranes are moist.   Eyes:      Extraocular Movements: Extraocular movements intact.      Conjunctiva/sclera: Conjunctivae normal.   Cardiovascular:      Rate and Rhythm: Normal rate.   Pulmonary:      Effort: Pulmonary effort is normal. No respiratory distress.   Abdominal:      General: There is no distension.      Palpations: Abdomen is soft.   Musculoskeletal:         General: No swelling.      Cervical back: Neck supple.   Skin:     General: Skin is warm.      Coloration: Skin is not jaundiced.   Neurological:      General: No  focal deficit present.      Mental Status: She is alert and oriented to person, place, and time.   Psychiatric:         Mood and Affect: Mood normal.         Behavior: Behavior normal.         Thought Content: Thought content normal.         Judgment: Judgment normal.       Significant Labs:   CBC:   Recent Labs   Lab 02/23/23  0632 02/24/23  0626   WBC 15.46* 16.02*   HGB 8.6* 8.5*   HCT 27.4* 27.1*    326    and CMP:   Recent Labs   Lab 02/23/23  0632 02/23/23  0948 02/24/23  0626   *  --  134*   K 4.2  --  4.2   CL 89*  --  91*   CO2 29  --  32*     --  88   BUN 10  --  10   CREATININE 0.7  --  0.8   CALCIUM 7.4*  --  7.4*   PROT  --  5.8* 5.2*   ALBUMIN  --  2.6* 2.3*   BILITOT  --  0.3 0.2   ALKPHOS  --  154* 170*   AST  --  44* 41*   ALT  --  12 10   ANIONGAP 14  --  11       Diagnostic Results:  I have reviewed all pertinent imaging results/findings within the past 24 hours.    Assessment/Plan:     Active Diagnoses:    Diagnosis Date Noted POA    PRINCIPAL PROBLEM:  Pneumonia involving right lung [J18.9] 02/20/2023 Yes    Liver mass [R16.0] 02/23/2023 Unknown    Postablative hypothyroidism [E89.0] 02/14/2023 Yes    Paroxysmal SVT (supraventricular tachycardia) [I47.1] 02/14/2023 Yes    CLL (chronic lymphocytic leukemia) [C91.10] 02/14/2023 Yes    Papillary thyroid carcinoma [C73] 02/10/2020 Yes    Acute on chronic respiratory failure with hypoxia [J96.21] 04/24/2016 Yes      Problems Resolved During this Admission:       See above    Thank you for your consult. I will sign off. Please contact us if you have any additional questions.  Will follow-up as outpatient when pathology returns    Ellyn Gonzalez MD  Hematology/Oncology  [facility]    VIRTUAL TELENOTE    Start time:  4:00 p.m.  Chief complaint:  Liver masses  The patient location is:  Hospital room  The patient arrived at:  Hospital room  Present with the patient at the time of the telemed/virtual assessment:  Nursing              End time:  4:15 p.m.    Total time spent with patient:  15 minutes    The attending portion of this evaluation, treatment, and documentation was performed per Ellyn Gonzalez MD via Telemedicine AudioVisual using the secure [employer] software platform with 2 way audio/video. The provider was located off-site and the patient is located in the hospital. The aforementioned video software was utilized to document the relevant history and physical exam.

## 2023-02-24 NOTE — PLAN OF CARE
TX COMPLETED: Limited EOB/OOB mobility due to abdominal pain and distention as well as back pain. Agreeable to supine therex. Cont POC

## 2023-02-24 NOTE — PLAN OF CARE
Pt remains free of injury throughout the shift, safety measures remain in place.   Poc reviewed with pt. Vss, no acute s/s of distress. Pt remains on 3L NC. Medication given as ordered. Hourly rounding done. Chart reviwed, will cont with poc.       Problem: Infection (Pneumonia)  Goal: Resolution of Infection Signs and Symptoms  Outcome: Ongoing, Progressing     Problem: Impaired Wound Healing  Goal: Optimal Wound Healing  Outcome: Ongoing, Progressing     Problem: Pain Acute  Goal: Acceptable Pain Control and Functional Ability  Outcome: Ongoing, Progressing     Problem: Fall Injury Risk  Goal: Absence of Fall and Fall-Related Injury  Outcome: Ongoing, Progressing

## 2023-02-24 NOTE — ASSESSMENT & PLAN NOTE
Hx of thyroid cancer, surgical removal 1/2020, I-131 11/2020, followed by Dr. Feliciano at [facility].       --Continue levothyroxine  --Thyroglobulin Ab

## 2023-02-25 LAB
ANISOCYTOSIS BLD QL SMEAR: SLIGHT
BACTERIA BLD CULT: NORMAL
BACTERIA BLD CULT: NORMAL
BASOPHILS NFR BLD: 0 % (ref 0–1.9)
DIFFERENTIAL METHOD: ABNORMAL
EOSINOPHIL NFR BLD: 5 % (ref 0–8)
ERYTHROCYTE [DISTWIDTH] IN BLOOD BY AUTOMATED COUNT: 15.3 % (ref 11.5–14.5)
HCT VFR BLD AUTO: 27.4 % (ref 37–48.5)
HGB BLD-MCNC: 8.5 G/DL (ref 12–16)
HYPOCHROMIA BLD QL SMEAR: ABNORMAL
IMM GRANULOCYTES # BLD AUTO: ABNORMAL K/UL (ref 0–0.04)
IMM GRANULOCYTES NFR BLD AUTO: ABNORMAL % (ref 0–0.5)
LYMPHOCYTES NFR BLD: 35 % (ref 18–48)
MAGNESIUM SERPL-MCNC: 1.6 MG/DL (ref 1.6–2.6)
MCH RBC QN AUTO: 27 PG (ref 27–31)
MCHC RBC AUTO-ENTMCNC: 31 G/DL (ref 32–36)
MCV RBC AUTO: 87 FL (ref 82–98)
MONOCYTES NFR BLD: 2 % (ref 4–15)
MYELOCYTES NFR BLD MANUAL: 1 %
NEUTROPHILS NFR BLD: 57 % (ref 38–73)
NRBC BLD-RTO: 0 /100 WBC
OVALOCYTES BLD QL SMEAR: ABNORMAL
PHOSPHATE SERPL-MCNC: 3.5 MG/DL (ref 2.7–4.5)
PLATELET # BLD AUTO: 300 K/UL (ref 150–450)
PMV BLD AUTO: 9.9 FL (ref 9.2–12.9)
POIKILOCYTOSIS BLD QL SMEAR: SLIGHT
RBC # BLD AUTO: 3.15 M/UL (ref 4–5.4)
THRYOGLOBULIN INTERPRETATION: ABNORMAL
THYROGLOB AB SERPL-ACNC: 9.8 IU/ML
THYROGLOB SERPL-MCNC: <0.1 NG/ML
WBC # BLD AUTO: 16.3 K/UL (ref 3.9–12.7)

## 2023-02-25 PROCEDURE — 25000003 PHARM REV CODE 250: Performed by: INTERNAL MEDICINE

## 2023-02-25 PROCEDURE — 96372 THER/PROPH/DIAG INJ SC/IM: CPT

## 2023-02-25 PROCEDURE — 85007 BL SMEAR W/DIFF WBC COUNT: CPT | Performed by: INTERNAL MEDICINE

## 2023-02-25 PROCEDURE — 25000242 PHARM REV CODE 250 ALT 637 W/ HCPCS: Performed by: INTERNAL MEDICINE

## 2023-02-25 PROCEDURE — 99233 PR SUBSEQUENT HOSPITAL CARE,LEVL III: ICD-10-PCS | Mod: ,,, | Performed by: INTERNAL MEDICINE

## 2023-02-25 PROCEDURE — 94761 N-INVAS EAR/PLS OXIMETRY MLT: CPT

## 2023-02-25 PROCEDURE — 83735 ASSAY OF MAGNESIUM: CPT | Performed by: INTERNAL MEDICINE

## 2023-02-25 PROCEDURE — 94640 AIRWAY INHALATION TREATMENT: CPT

## 2023-02-25 PROCEDURE — 84100 ASSAY OF PHOSPHORUS: CPT | Performed by: INTERNAL MEDICINE

## 2023-02-25 PROCEDURE — 25000003 PHARM REV CODE 250: Performed by: NURSE PRACTITIONER

## 2023-02-25 PROCEDURE — 63600175 PHARM REV CODE 636 W HCPCS: Performed by: INTERNAL MEDICINE

## 2023-02-25 PROCEDURE — 94799 UNLISTED PULMONARY SVC/PX: CPT

## 2023-02-25 PROCEDURE — 27000221 HC OXYGEN, UP TO 24 HOURS

## 2023-02-25 PROCEDURE — 85027 COMPLETE CBC AUTOMATED: CPT | Performed by: INTERNAL MEDICINE

## 2023-02-25 PROCEDURE — 99233 SBSQ HOSP IP/OBS HIGH 50: CPT | Mod: ,,, | Performed by: INTERNAL MEDICINE

## 2023-02-25 PROCEDURE — 99900035 HC TECH TIME PER 15 MIN (STAT)

## 2023-02-25 PROCEDURE — 21400001 HC TELEMETRY ROOM

## 2023-02-25 PROCEDURE — 63600175 PHARM REV CODE 636 W HCPCS: Mod: TB,JG | Performed by: NURSE PRACTITIONER

## 2023-02-25 RX ADMIN — SENNOSIDES AND DOCUSATE SODIUM 1 TABLET: 50; 8.6 TABLET ORAL at 08:02

## 2023-02-25 RX ADMIN — GABAPENTIN 400 MG: 400 CAPSULE ORAL at 12:02

## 2023-02-25 RX ADMIN — CALCITRIOL CAPSULES 0.25 MCG 0.25 MCG: 0.25 CAPSULE ORAL at 08:02

## 2023-02-25 RX ADMIN — MONTELUKAST 10 MG: 10 TABLET, FILM COATED ORAL at 08:02

## 2023-02-25 RX ADMIN — BUDESONIDE 0.5 MG: 0.5 INHALANT ORAL at 07:02

## 2023-02-25 RX ADMIN — TIZANIDINE 4 MG: 4 TABLET ORAL at 08:02

## 2023-02-25 RX ADMIN — MEROPENEM AND SODIUM CHLORIDE 500 MG: 500 INJECTION, SOLUTION INTRAVENOUS at 02:02

## 2023-02-25 RX ADMIN — BUSPIRONE HYDROCHLORIDE 7.5 MG: 5 TABLET ORAL at 08:02

## 2023-02-25 RX ADMIN — IPRATROPIUM BROMIDE 0.5 MG: 0.5 SOLUTION RESPIRATORY (INHALATION) at 07:02

## 2023-02-25 RX ADMIN — OXYCODONE AND ACETAMINOPHEN 1 TABLET: 7.5; 325 TABLET ORAL at 03:02

## 2023-02-25 RX ADMIN — ARFORMOTEROL TARTRATE 15 MCG: 15 SOLUTION RESPIRATORY (INHALATION) at 07:02

## 2023-02-25 RX ADMIN — ALPRAZOLAM 0.5 MG: 0.5 TABLET ORAL at 08:02

## 2023-02-25 RX ADMIN — ALPRAZOLAM 0.5 MG: 0.5 TABLET ORAL at 10:02

## 2023-02-25 RX ADMIN — DOXYCYCLINE HYCLATE 100 MG: 100 TABLET, COATED ORAL at 08:02

## 2023-02-25 RX ADMIN — MEROPENEM AND SODIUM CHLORIDE 500 MG: 500 INJECTION, SOLUTION INTRAVENOUS at 08:02

## 2023-02-25 RX ADMIN — GABAPENTIN 400 MG: 400 CAPSULE ORAL at 04:02

## 2023-02-25 RX ADMIN — OXYCODONE AND ACETAMINOPHEN 1 TABLET: 7.5; 325 TABLET ORAL at 06:02

## 2023-02-25 RX ADMIN — GABAPENTIN 400 MG: 400 CAPSULE ORAL at 08:02

## 2023-02-25 RX ADMIN — DILTIAZEM HYDROCHLORIDE 120 MG: 120 CAPSULE, COATED, EXTENDED RELEASE ORAL at 08:02

## 2023-02-25 RX ADMIN — ENOXAPARIN SODIUM 40 MG: 40 INJECTION SUBCUTANEOUS at 04:02

## 2023-02-25 RX ADMIN — LEVOTHYROXINE SODIUM 125 MCG: 125 TABLET ORAL at 05:02

## 2023-02-25 RX ADMIN — OXYCODONE AND ACETAMINOPHEN 1 TABLET: 7.5; 325 TABLET ORAL at 08:02

## 2023-02-25 NOTE — ASSESSMENT & PLAN NOTE
Hx of thyroid cancer, surgical removal 1/2020, I-131 11/2020, followed by Dr. Feliciano at [facility].       --Continue levothyroxine  --Thyroglobulin Ab: elevated

## 2023-02-25 NOTE — PROGRESS NOTES
[facility]  Hematology/Oncology  Progress Note    Patient Name: Christine Horner  Admission Date: 2/20/2023  Hospital Length of Stay: 5 days  Code Status: Full Code     Subjective:     HPI:  No notes on file    Interval History:  Endorses fatigue. stable shortness of breath.    Oncology Treatment Plan:   [No matching plan found]    Medications:  Continuous Infusions:  Scheduled Meds:   arformoteroL  15 mcg Nebulization BID    budesonide  0.5 mg Nebulization Q12H    busPIRone  7.5 mg Oral BID    calcitRIOL  0.25 mcg Oral Daily    diltiaZEM  120 mg Oral BID    doxycycline  100 mg Oral Q12H    enoxaparin  40 mg Subcutaneous Daily    gabapentin  400 mg Oral QID    ipratropium  0.5 mg Nebulization Q6H WAKE    levothyroxine  125 mcg Oral Before breakfast    meropenem (MERREM) IVPB  500 mg Intravenous Q6H    montelukast  10 mg Oral Daily    polyethylene glycol  17 g Oral Daily    senna-docusate 8.6-50 mg  1 tablet Oral Daily     PRN Meds:ALPRAZolam, artificial tears, levalbuterol, ondansetron, oxyCODONE-acetaminophen, sodium chloride 0.9%, tiZANidine     Review of Systems   Constitutional:  Positive for fatigue. Negative for activity change, appetite change, chills, diaphoresis, fever and unexpected weight change.   HENT:  Negative for congestion, rhinorrhea and sinus pain.    Respiratory:  Positive for shortness of breath. Negative for cough, choking, chest tightness, wheezing and stridor.    Cardiovascular:  Negative for chest pain, palpitations and leg swelling.   Gastrointestinal:  Negative for abdominal distention, abdominal pain, anal bleeding, blood in stool, constipation, diarrhea, nausea, rectal pain and vomiting.   Skin:  Negative for rash.   Hematological:  Does not bruise/bleed easily.   Psychiatric/Behavioral: Negative.     Objective:     Vital Signs (Most Recent):  Temp: 97.5 °F (36.4 °C) (02/25/23 0803)  Pulse: 87 (02/25/23 0803)  Resp: 16 (02/25/23 0803)  BP: 124/70 (02/25/23 0803)  SpO2: (!) 94 %  (02/25/23 0803)   Vital Signs (24h Range):  Temp:  [97.1 °F (36.2 °C)-98.2 °F (36.8 °C)] 97.5 °F (36.4 °C)  Pulse:  [67-99] 87  Resp:  [16-19] 16  SpO2:  [94 %-97 %] 94 %  BP: ()/(56-70) 124/70     Weight: 65.2 kg (143 lb 11.8 oz)  Body mass index is 23.92 kg/m².  Body surface area is 1.73 meters squared.      Intake/Output Summary (Last 24 hours) at 2/25/2023 1050  Last data filed at 2/25/2023 0634  Gross per 24 hour   Intake 148.44 ml   Output 2500 ml   Net -2351.56 ml       Physical Exam  Vitals reviewed.   Constitutional:       General: She is not in acute distress.     Appearance: Normal appearance. She is ill-appearing.   HENT:      Head: Normocephalic and atraumatic.      Nose: No congestion.      Mouth/Throat:      Mouth: Mucous membranes are moist.   Eyes:      Extraocular Movements: Extraocular movements intact.      Conjunctiva/sclera: Conjunctivae normal.   Cardiovascular:      Rate and Rhythm: Normal rate.   Pulmonary:      Effort: Pulmonary effort is normal. No respiratory distress.   Abdominal:      General: There is no distension.      Palpations: Abdomen is soft.   Musculoskeletal:         General: No swelling.      Cervical back: Neck supple.   Skin:     General: Skin is warm.      Coloration: Skin is not jaundiced.   Neurological:      General: No focal deficit present.      Mental Status: She is alert and oriented to person, place, and time.   Psychiatric:         Mood and Affect: Mood normal.         Behavior: Behavior normal.         Thought Content: Thought content normal.         Judgment: Judgment normal.       Significant Labs:   CBC:   Recent Labs   Lab 02/24/23  0626 02/25/23  0511   WBC 16.02* 16.30*   HGB 8.5* 8.5*   HCT 27.1* 27.4*    300    and CMP:   Recent Labs   Lab 02/24/23  0626   *   K 4.2   CL 91*   CO2 32*   GLU 88   BUN 10   CREATININE 0.8   CALCIUM 7.4*   PROT 5.2*   ALBUMIN 2.3*   BILITOT 0.2   ALKPHOS 170*   AST 41*   ALT 10   ANIONGAP 11        Diagnostic Results:  I have reviewed all pertinent imaging results/findings within the past 24 hours.    Assessment/Plan:     * Pneumonia involving right lung  Management per primary team on antibiotics.    Liver mass  Evaluation for acute on chronic respiratory failure on admission revealed liver lesions multifocal up to 12 cm right lobe.    Agree with plan for IR biopsy Monday 02/27/2023 pending will need outside oncology follow-up.  She is already established with hematologist oncologist Dr. Yanez at [facility]    Papillary thyroid carcinoma  History of papillary thyroid carcinoma 2020 status post thyroidectomy and radioiodine ablation under surveillance with outside endocrinologist on Synthroid.  Workup for concern for metastatic disease in liver has included thyroglobulin antibody/antigen pending.  Recommend follow-up with outside endocrinologist on discharge.    CLL (chronic lymphocytic leukemia)  Followed by Dr. Yanez at [facility] not on active therapy.  On admission no notable progression persistent with lymphocytosis.  She does have mild baseline anemia progressive during current admission may be related to acute event infection pneumonia or additional malignancy, see above.  Recommend follow-up with her primary hematologist oncologist on discharge.        Thank you for your consult. I will follow-up with patient. Please contact us if you have any additional questions.     Ellyn Gonzalez MD  Hematology/Oncology  [facility]     Addendum :  I have discussed with Hospital Medicine team   As well as patient to have her follow-up with her primary outside hematologist oncologist Dr. Hurt to review final pathology and next steps regarding liver lesions biopsy planned 02/27/2023. We will sign off at this time; available as needed.

## 2023-02-25 NOTE — PLAN OF CARE
Pt Awake, Alert, and oriented to Person, Place, Time  , remains free from falls/injuries this shift. Safety precautions maintained. Pain managed with pain medication and rest. No s/s of acute distress noted. Continue with plan of care. Chart check complete.

## 2023-02-25 NOTE — SUBJECTIVE & OBJECTIVE
Interval History: Plan for Liver Biopsy on Monday. Encouraged patient to participate with PT/OT to prepare for d/c home.     Review of Systems   Constitutional:  Positive for activity change and fatigue. Negative for appetite change, chills, diaphoresis, fever and unexpected weight change.   HENT:  Negative for congestion, hearing loss, mouth sores, postnasal drip, rhinorrhea, sore throat and trouble swallowing.    Eyes:  Negative for discharge and visual disturbance.   Respiratory:  Positive for shortness of breath. Negative for cough and chest tightness.    Cardiovascular:  Negative for chest pain, palpitations and leg swelling.   Gastrointestinal:  Positive for abdominal distention and abdominal pain. Negative for blood in stool, constipation, diarrhea, nausea and vomiting.   Endocrine: Negative for cold intolerance and heat intolerance.   Genitourinary:  Negative for difficulty urinating, dyspareunia, flank pain and hematuria.   Musculoskeletal:  Negative for arthralgias, back pain and myalgias.   Skin: Negative.    Neurological:  Positive for weakness. Negative for dizziness, light-headedness and headaches.   Hematological:  Negative for adenopathy. Does not bruise/bleed easily.   Psychiatric/Behavioral:  Negative for agitation, behavioral problems and confusion. The patient is nervous/anxious.    Objective:     Vital Signs (Most Recent):  Temp: 97.5 °F (36.4 °C) (02/25/23 1204)  Pulse: 77 (02/25/23 1204)  Resp: 16 (02/25/23 1204)  BP: 106/68 (02/25/23 1204)  SpO2: (!) 93 % (02/25/23 1204)   Vital Signs (24h Range):  Temp:  [97.1 °F (36.2 °C)-98.2 °F (36.8 °C)] 97.5 °F (36.4 °C)  Pulse:  [67-99] 77  Resp:  [16-18] 16  SpO2:  [93 %-97 %] 93 %  BP: ()/(56-70) 106/68     Weight: 65.2 kg (143 lb 11.8 oz)  Body mass index is 23.92 kg/m².    Intake/Output Summary (Last 24 hours) at 2/25/2023 1453  Last data filed at 2/25/2023 0634  Gross per 24 hour   Intake 148.44 ml   Output 1500 ml   Net -1351.56 ml       Physical Exam  Vitals and nursing note reviewed.   Constitutional:       General: She is not in acute distress.     Appearance: She is well-developed. She is ill-appearing.   HENT:      Head: Normocephalic and atraumatic.      Right Ear: Hearing and external ear normal.      Left Ear: Hearing and external ear normal.      Nose: No rhinorrhea.      Right Sinus: No maxillary sinus tenderness or frontal sinus tenderness.      Left Sinus: No maxillary sinus tenderness or frontal sinus tenderness.      Mouth/Throat:      Mouth: No oral lesions.      Pharynx: Uvula midline.   Eyes:      General:         Right eye: No discharge.         Left eye: No discharge.      Conjunctiva/sclera: Conjunctivae normal.      Pupils: Pupils are equal, round, and reactive to light.   Neck:      Thyroid: No thyromegaly.      Vascular: No carotid bruit.      Trachea: No tracheal deviation.   Cardiovascular:      Rate and Rhythm: Normal rate and regular rhythm.      Pulses:           Dorsalis pedis pulses are 2+ on the right side and 2+ on the left side.      Heart sounds: Normal heart sounds, S1 normal and S2 normal. No murmur heard.  Pulmonary:      Effort: Pulmonary effort is normal. No respiratory distress.      Breath sounds: Normal breath sounds.   Abdominal:      General: Bowel sounds are decreased. There is distension.      Palpations: Abdomen is soft. There is no mass.      Tenderness: There is generalized abdominal tenderness.   Musculoskeletal:         General: Normal range of motion.      Cervical back: Normal range of motion.   Lymphadenopathy:      Cervical: No cervical adenopathy.      Upper Body:      Right upper body: No supraclavicular adenopathy.      Left upper body: No supraclavicular adenopathy.   Skin:     General: Skin is warm and dry.      Capillary Refill: Capillary refill takes less than 2 seconds.      Findings: No rash.   Neurological:      Mental Status: She is alert and oriented to person, place, and time.       Sensory: No sensory deficit.      Coordination: Coordination normal.      Gait: Gait normal.   Psychiatric:         Mood and Affect: Mood is not anxious or depressed.         Speech: Speech normal.         Behavior: Behavior normal.         Thought Content: Thought content normal.         Judgment: Judgment normal.       Significant Labs: All pertinent labs within the past 24 hours have been reviewed.  CBC:   Recent Labs   Lab 02/24/23  0626 02/25/23  0511   WBC 16.02* 16.30*   HGB 8.5* 8.5*   HCT 27.1* 27.4*    300     CMP:   Recent Labs   Lab 02/24/23  0626   *   K 4.2   CL 91*   CO2 32*   GLU 88   BUN 10   CREATININE 0.8   CALCIUM 7.4*   PROT 5.2*   ALBUMIN 2.3*   BILITOT 0.2   ALKPHOS 170*   AST 41*   ALT 10   ANIONGAP 11       Significant Imaging: I have reviewed all pertinent imaging results/findings within the past 24 hours.

## 2023-02-25 NOTE — PROGRESS NOTES
[facility]  Progress Note    Patient Name: Christine Horner  MRN: [id number]  Patient Class: IP- Inpatient   Admission Date: 2/20/2023  Length of Stay: 5 days  Attending Physician: Ramon Tenorio, *  Primary Care Provider: Eloy Hdez MD        Subjective:     Principal Problem:Pneumonia involving right lung        HPI:  The patient is a 62 yo female with past medical history of anemia, arthritis, COPD, chronic respiratory failure on home oxygen, pneumonia and chronic back pain who presented to the ED with increased oxygen requirements. She is usually on 3L nasal cannula. She was discharged from the hospital last week on Levaquin for pneumonia. Home health went to see patient today and found her oxygen saturation to be 82% on 3L.  Her flow was increased to 5L with slow improvement to low 90s. She was instructed to come to the ED for further evaluation. She reports she was tired and weak over the weekend. Workup in the ED with increasing density in right lung on CT chest. She reports cough is becoming productive. The sputum is yellow greenish color. Previously cough was not productive.Hospital medicine consulted for admission. Discussed CT findings with patient.       Overview/Hospital Course:  63 year old female, under the management of Hospital Medicine for pneumonia, acute on chronic respiratory failure. Patient O2 requirements decreased overnight, currently at baseline with 3L NC, SpO2 stable: 95%. Currently treated with Doxycycline and Merrem due to Hx of respiratory pseudomonas (11/22). Sputum culture ordered, patient has not been able to produce sputum for culture, has been given 3% saline nebs to assist without success. BC: NGTD. PT/OT ordered. Patient with significant abdominal distension, KUB non-contributory. Abdominal US: concerning for liver masses. Ordered triple-phase CT of Liver: Multiple large hypoenhancing masses in the liver, largest on the right measuring 12 x  10 cm, largest on the left measuring 2.2 x 1.9 cm, compatible with metastatic disease. Ordered AFP, consulted IR for possible Biopsy, consulted Hem/Onc. Discussed with patient, has Hx of Thyroid Cancer (2020), treated with thyroidectomy 1/2020, I-131 11/2020. Ordered Thyroglobulin Ab: elevated, AFP: normal. On 2/24/23, IR consulted for biopsy, plan for Monday, NPO after midnight.  Hematology-Oncology consult-awaiting tissue confirmation.  PT/OT evaluations completed with home health recommended.  Social work consulted to establish home health prior to discharge.        Interval History: Plan for Liver Biopsy on Monday. Encouraged patient to participate with PT/OT to prepare for d/c home.     Review of Systems   Constitutional:  Positive for activity change and fatigue. Negative for appetite change, chills, diaphoresis, fever and unexpected weight change.   HENT:  Negative for congestion, hearing loss, mouth sores, postnasal drip, rhinorrhea, sore throat and trouble swallowing.    Eyes:  Negative for discharge and visual disturbance.   Respiratory:  Positive for shortness of breath. Negative for cough and chest tightness.    Cardiovascular:  Negative for chest pain, palpitations and leg swelling.   Gastrointestinal:  Positive for abdominal distention and abdominal pain. Negative for blood in stool, constipation, diarrhea, nausea and vomiting.   Endocrine: Negative for cold intolerance and heat intolerance.   Genitourinary:  Negative for difficulty urinating, dyspareunia, flank pain and hematuria.   Musculoskeletal:  Negative for arthralgias, back pain and myalgias.   Skin: Negative.    Neurological:  Positive for weakness. Negative for dizziness, light-headedness and headaches.   Hematological:  Negative for adenopathy. Does not bruise/bleed easily.   Psychiatric/Behavioral:  Negative for agitation, behavioral problems and confusion. The patient is nervous/anxious.    Objective:     Vital Signs (Most Recent):  Temp:  97.5 °F (36.4 °C) (02/25/23 1204)  Pulse: 77 (02/25/23 1204)  Resp: 16 (02/25/23 1204)  BP: 106/68 (02/25/23 1204)  SpO2: (!) 93 % (02/25/23 1204)   Vital Signs (24h Range):  Temp:  [97.1 °F (36.2 °C)-98.2 °F (36.8 °C)] 97.5 °F (36.4 °C)  Pulse:  [67-99] 77  Resp:  [16-18] 16  SpO2:  [93 %-97 %] 93 %  BP: ()/(56-70) 106/68     Weight: 65.2 kg (143 lb 11.8 oz)  Body mass index is 23.92 kg/m².    Intake/Output Summary (Last 24 hours) at 2/25/2023 1453  Last data filed at 2/25/2023 0634  Gross per 24 hour   Intake 148.44 ml   Output 1500 ml   Net -1351.56 ml      Physical Exam  Vitals and nursing note reviewed.   Constitutional:       General: She is not in acute distress.     Appearance: She is well-developed. She is ill-appearing.   HENT:      Head: Normocephalic and atraumatic.      Right Ear: Hearing and external ear normal.      Left Ear: Hearing and external ear normal.      Nose: No rhinorrhea.      Right Sinus: No maxillary sinus tenderness or frontal sinus tenderness.      Left Sinus: No maxillary sinus tenderness or frontal sinus tenderness.      Mouth/Throat:      Mouth: No oral lesions.      Pharynx: Uvula midline.   Eyes:      General:         Right eye: No discharge.         Left eye: No discharge.      Conjunctiva/sclera: Conjunctivae normal.      Pupils: Pupils are equal, round, and reactive to light.   Neck:      Thyroid: No thyromegaly.      Vascular: No carotid bruit.      Trachea: No tracheal deviation.   Cardiovascular:      Rate and Rhythm: Normal rate and regular rhythm.      Pulses:           Dorsalis pedis pulses are 2+ on the right side and 2+ on the left side.      Heart sounds: Normal heart sounds, S1 normal and S2 normal. No murmur heard.  Pulmonary:      Effort: Pulmonary effort is normal. No respiratory distress.      Breath sounds: Normal breath sounds.   Abdominal:      General: Bowel sounds are decreased. There is distension.      Palpations: Abdomen is soft. There is no mass.       Tenderness: There is generalized abdominal tenderness.   Musculoskeletal:         General: Normal range of motion.      Cervical back: Normal range of motion.   Lymphadenopathy:      Cervical: No cervical adenopathy.      Upper Body:      Right upper body: No supraclavicular adenopathy.      Left upper body: No supraclavicular adenopathy.   Skin:     General: Skin is warm and dry.      Capillary Refill: Capillary refill takes less than 2 seconds.      Findings: No rash.   Neurological:      Mental Status: She is alert and oriented to person, place, and time.      Sensory: No sensory deficit.      Coordination: Coordination normal.      Gait: Gait normal.   Psychiatric:         Mood and Affect: Mood is not anxious or depressed.         Speech: Speech normal.         Behavior: Behavior normal.         Thought Content: Thought content normal.         Judgment: Judgment normal.       Significant Labs: All pertinent labs within the past 24 hours have been reviewed.  CBC:   Recent Labs   Lab 02/24/23  0626 02/25/23  0511   WBC 16.02* 16.30*   HGB 8.5* 8.5*   HCT 27.1* 27.4*    300     CMP:   Recent Labs   Lab 02/24/23  0626   *   K 4.2   CL 91*   CO2 32*   GLU 88   BUN 10   CREATININE 0.8   CALCIUM 7.4*   PROT 5.2*   ALBUMIN 2.3*   BILITOT 0.2   ALKPHOS 170*   AST 41*   ALT 10   ANIONGAP 11       Significant Imaging: I have reviewed all pertinent imaging results/findings within the past 24 hours.      Assessment/Plan:      * Pneumonia involving right lung  Previously treated for Pseudomonal pneumonia  Continue meropenem and doxycycline  f/u sputum culture- unable to collect  Appreciate pulmonology    Liver mass  Increasing abdominal distension, ordered Abdominal US: multiple liver masses, ordered CT Liver: Multiple large hypoenhancing masses in the liver, largest on the right measuring 12 x 10 cm, largest on the left measuring 2.2 x 1.9 cm, compatible with metastatic disease. Hx of Thyroid  Cancer    --Thyroglobulin Ab, AFP- to evaluate  -IR consulted for biopsy-  to be rescheduled Monday  -Consult Hem/Onc- following, recommend Bx of Liver  -patient to follow with primary Oncologist          Papillary thyroid carcinoma  Thyroidectomy: 1/2020, Radiation ablation: 11/2020, managed by Dr. Prieto at [facility]    --Continue levothyroxine      CLL (chronic lymphocytic leukemia)  Leukocytosis secondary to CLL, Followed by outside heme-onc    --Daily CBC        Postablative hypothyroidism  Hx of thyroid cancer, surgical removal 1/2020, I-131 11/2020, followed by Dr. Feliciano at [facility].       --Continue levothyroxine  --Thyroglobulin Ab: elevated      Paroxysmal SVT (supraventricular tachycardia)  Continue home medications  Heart rate currently controlled      Acute on chronic respiratory failure with hypoxia  Patient with Hypercapnic and Hypoxic Respiratory failure which is Acute on chronic.  she is on home oxygen at 3 LPM. Supplemental oxygen was provided and noted- Oxygen Concentration (%):  [32] 32.   Signs/symptoms of respiratory failure include- tachypnea, increased work of breathing and lethargy. Contributing diagnoses includes - COPD, Pleural effusion and Pneumonia Labs and images were reviewed. Patient Has recent ABG, which has been reviewed. Will treat underlying causes and adjust management of respiratory failure as follows- IV antibiotics, supplemental oxygen, prn breathing treatments  -Pulmonology following      VTE Risk Mitigation (From admission, onward)         Ordered     enoxaparin injection 40 mg  Daily         02/20/23 1656     IP VTE HIGH RISK PATIENT  Once         02/20/23 1656     Place sequential compression device  Until discontinued         02/20/23 1656                Discharge Planning   MARK:      Code Status: Full Code   Is the patient medically ready for discharge?:     Reason for patient still in hospital (select all that apply): Patient trending condition  Discharge Plan A: Home Health    Discharge Delays: None known at this time              Kinsey Huerta NP  Department of Hospital Medicine   [place] [facility]

## 2023-02-25 NOTE — ASSESSMENT & PLAN NOTE
Increasing abdominal distension, ordered Abdominal US: multiple liver masses, ordered CT Liver: Multiple large hypoenhancing masses in the liver, largest on the right measuring 12 x 10 cm, largest on the left measuring 2.2 x 1.9 cm, compatible with metastatic disease. Hx of Thyroid Cancer    --Thyroglobulin Ab, AFP- to evaluate  -IR consulted for biopsy-  to be rescheduled Monday  -Consult Hem/Onc- following, recommend Bx of Liver  -patient to follow with primary Oncologist

## 2023-02-25 NOTE — ASSESSMENT & PLAN NOTE
Followed by Dr. Yanez at [facility] not on active therapy.  On admission no notable progression persistent with lymphocytosis.  She does have mild baseline anemia progressive during current admission may be related to acute event infection pneumonia or additional malignancy, see above.  Recommend follow-up with her primary hematologist oncologist on discharge.

## 2023-02-25 NOTE — SUBJECTIVE & OBJECTIVE
Interval History:  Endorses fatigue. stable shortness of breath.    Oncology Treatment Plan:   [No matching plan found]    Medications:  Continuous Infusions:  Scheduled Meds:   arformoteroL  15 mcg Nebulization BID    budesonide  0.5 mg Nebulization Q12H    busPIRone  7.5 mg Oral BID    calcitRIOL  0.25 mcg Oral Daily    diltiaZEM  120 mg Oral BID    doxycycline  100 mg Oral Q12H    enoxaparin  40 mg Subcutaneous Daily    gabapentin  400 mg Oral QID    ipratropium  0.5 mg Nebulization Q6H WAKE    levothyroxine  125 mcg Oral Before breakfast    meropenem (MERREM) IVPB  500 mg Intravenous Q6H    montelukast  10 mg Oral Daily    polyethylene glycol  17 g Oral Daily    senna-docusate 8.6-50 mg  1 tablet Oral Daily     PRN Meds:ALPRAZolam, artificial tears, levalbuterol, ondansetron, oxyCODONE-acetaminophen, sodium chloride 0.9%, tiZANidine     Review of Systems   Constitutional:  Positive for fatigue. Negative for activity change, appetite change, chills, diaphoresis, fever and unexpected weight change.   HENT:  Negative for congestion, rhinorrhea and sinus pain.    Respiratory:  Positive for shortness of breath. Negative for cough, choking, chest tightness, wheezing and stridor.    Cardiovascular:  Negative for chest pain, palpitations and leg swelling.   Gastrointestinal:  Negative for abdominal distention, abdominal pain, anal bleeding, blood in stool, constipation, diarrhea, nausea, rectal pain and vomiting.   Skin:  Negative for rash.   Hematological:  Does not bruise/bleed easily.   Psychiatric/Behavioral: Negative.     Objective:     Vital Signs (Most Recent):  Temp: 97.5 °F (36.4 °C) (02/25/23 0803)  Pulse: 87 (02/25/23 0803)  Resp: 16 (02/25/23 0803)  BP: 124/70 (02/25/23 0803)  SpO2: (!) 94 % (02/25/23 0803)   Vital Signs (24h Range):  Temp:  [97.1 °F (36.2 °C)-98.2 °F (36.8 °C)] 97.5 °F (36.4 °C)  Pulse:  [67-99] 87  Resp:  [16-19] 16  SpO2:  [94 %-97 %] 94 %  BP: ()/(56-70) 124/70     Weight: 65.2 kg  (143 lb 11.8 oz)  Body mass index is 23.92 kg/m².  Body surface area is 1.73 meters squared.      Intake/Output Summary (Last 24 hours) at 2/25/2023 1050  Last data filed at 2/25/2023 0634  Gross per 24 hour   Intake 148.44 ml   Output 2500 ml   Net -2351.56 ml       Physical Exam  Vitals reviewed.   Constitutional:       General: She is not in acute distress.     Appearance: Normal appearance. She is ill-appearing.   HENT:      Head: Normocephalic and atraumatic.      Nose: No congestion.      Mouth/Throat:      Mouth: Mucous membranes are moist.   Eyes:      Extraocular Movements: Extraocular movements intact.      Conjunctiva/sclera: Conjunctivae normal.   Cardiovascular:      Rate and Rhythm: Normal rate.   Pulmonary:      Effort: Pulmonary effort is normal. No respiratory distress.   Abdominal:      General: There is no distension.      Palpations: Abdomen is soft.   Musculoskeletal:         General: No swelling.      Cervical back: Neck supple.   Skin:     General: Skin is warm.      Coloration: Skin is not jaundiced.   Neurological:      General: No focal deficit present.      Mental Status: She is alert and oriented to person, place, and time.   Psychiatric:         Mood and Affect: Mood normal.         Behavior: Behavior normal.         Thought Content: Thought content normal.         Judgment: Judgment normal.       Significant Labs:   CBC:   Recent Labs   Lab 02/24/23  0626 02/25/23  0511   WBC 16.02* 16.30*   HGB 8.5* 8.5*   HCT 27.1* 27.4*    300    and CMP:   Recent Labs   Lab 02/24/23  0626   *   K 4.2   CL 91*   CO2 32*   GLU 88   BUN 10   CREATININE 0.8   CALCIUM 7.4*   PROT 5.2*   ALBUMIN 2.3*   BILITOT 0.2   ALKPHOS 170*   AST 41*   ALT 10   ANIONGAP 11       Diagnostic Results:  I have reviewed all pertinent imaging results/findings within the past 24 hours.

## 2023-02-25 NOTE — ASSESSMENT & PLAN NOTE
Thyroidectomy: 1/2020, Radiation ablation: 11/2020, managed by Dr. Prieto at [facility]    --Continue levothyroxine

## 2023-02-25 NOTE — PLAN OF CARE
Pt remains free of injury throughout the shift, safety measures remain in place.   Poc reviewed with pt. Vss, no acute s/s of distress. Midline remains CD. Pain managed with PRN medication. Hourly rounding done. Chart reviwed, will cont with poc.      Problem: Infection (Pneumonia)  Goal: Resolution of Infection Signs and Symptoms  Outcome: Ongoing, Progressing     Problem: Pain Acute  Goal: Acceptable Pain Control and Functional Ability  Outcome: Ongoing, Progressing     Problem: Fall Injury Risk  Goal: Absence of Fall and Fall-Related Injury  Outcome: Ongoing, Progressing     Problem: Skin Injury Risk Increased  Goal: Skin Health and Integrity  Outcome: Ongoing, Progressing     Problem: Infection  Goal: Absence of Infection Signs and Symptoms  Outcome: Ongoing, Progressing

## 2023-02-25 NOTE — ASSESSMENT & PLAN NOTE
Evaluation for acute on chronic respiratory failure on admission revealed liver lesions multifocal up to 12 cm right lobe.    Agree with plan for IR biopsy Monday 02/27/2023 pending will need outside oncology follow-up.  She is already established with hematologist oncologist Dr. Yanez at [facility]

## 2023-02-25 NOTE — ASSESSMENT & PLAN NOTE
History of papillary thyroid carcinoma 2020 status post thyroidectomy and radioiodine ablation under surveillance with outside endocrinologist on Synthroid.  Workup for concern for metastatic disease in liver has included thyroglobulin antibody/antigen pending.  Recommend follow-up with outside endocrinologist on discharge.

## 2023-02-26 PROBLEM — R68.81 EARLY SATIETY: Status: ACTIVE | Noted: 2023-02-26

## 2023-02-26 LAB
ALBUMIN SERPL BCP-MCNC: 2.4 G/DL (ref 3.5–5.2)
ALP SERPL-CCNC: 193 U/L (ref 55–135)
ALT SERPL W/O P-5'-P-CCNC: 13 U/L (ref 10–44)
ANION GAP SERPL CALC-SCNC: 14 MMOL/L (ref 8–16)
ANISOCYTOSIS BLD QL SMEAR: SLIGHT
AST SERPL-CCNC: 52 U/L (ref 10–40)
BASOPHILS NFR BLD: 0 % (ref 0–1.9)
BILIRUB SERPL-MCNC: 0.2 MG/DL (ref 0.1–1)
BUN SERPL-MCNC: 17 MG/DL (ref 8–23)
CALCIUM SERPL-MCNC: 7.4 MG/DL (ref 8.7–10.5)
CHLORIDE SERPL-SCNC: 94 MMOL/L (ref 95–110)
CO2 SERPL-SCNC: 31 MMOL/L (ref 23–29)
CREAT SERPL-MCNC: 0.8 MG/DL (ref 0.5–1.4)
DIFFERENTIAL METHOD: ABNORMAL
EOSINOPHIL NFR BLD: 1 % (ref 0–8)
ERYTHROCYTE [DISTWIDTH] IN BLOOD BY AUTOMATED COUNT: 15.5 % (ref 11.5–14.5)
EST. GFR  (NO RACE VARIABLE): >60 ML/MIN/1.73 M^2
GLUCOSE SERPL-MCNC: 79 MG/DL (ref 70–110)
HCT VFR BLD AUTO: 28 % (ref 37–48.5)
HGB BLD-MCNC: 8.6 G/DL (ref 12–16)
HYPOCHROMIA BLD QL SMEAR: ABNORMAL
IMM GRANULOCYTES # BLD AUTO: ABNORMAL K/UL (ref 0–0.04)
IMM GRANULOCYTES NFR BLD AUTO: ABNORMAL % (ref 0–0.5)
LYMPHOCYTES NFR BLD: 45 % (ref 18–48)
MCH RBC QN AUTO: 26.8 PG (ref 27–31)
MCHC RBC AUTO-ENTMCNC: 30.7 G/DL (ref 32–36)
MCV RBC AUTO: 87 FL (ref 82–98)
MONOCYTES NFR BLD: 4 % (ref 4–15)
NEUTROPHILS NFR BLD: 50 % (ref 38–73)
NRBC BLD-RTO: 0 /100 WBC
OVALOCYTES BLD QL SMEAR: ABNORMAL
PLATELET # BLD AUTO: 287 K/UL (ref 150–450)
PMV BLD AUTO: 10 FL (ref 9.2–12.9)
POIKILOCYTOSIS BLD QL SMEAR: SLIGHT
POTASSIUM SERPL-SCNC: 4.2 MMOL/L (ref 3.5–5.1)
PROT SERPL-MCNC: 5.5 G/DL (ref 6–8.4)
RBC # BLD AUTO: 3.21 M/UL (ref 4–5.4)
SODIUM SERPL-SCNC: 139 MMOL/L (ref 136–145)
WBC # BLD AUTO: 19.39 K/UL (ref 3.9–12.7)

## 2023-02-26 PROCEDURE — 80053 COMPREHEN METABOLIC PANEL: CPT | Performed by: NURSE PRACTITIONER

## 2023-02-26 PROCEDURE — 94799 UNLISTED PULMONARY SVC/PX: CPT

## 2023-02-26 PROCEDURE — 25000003 PHARM REV CODE 250: Performed by: NURSE PRACTITIONER

## 2023-02-26 PROCEDURE — 99900035 HC TECH TIME PER 15 MIN (STAT)

## 2023-02-26 PROCEDURE — 27000221 HC OXYGEN, UP TO 24 HOURS

## 2023-02-26 PROCEDURE — 25000242 PHARM REV CODE 250 ALT 637 W/ HCPCS: Performed by: INTERNAL MEDICINE

## 2023-02-26 PROCEDURE — 85007 BL SMEAR W/DIFF WBC COUNT: CPT | Performed by: INTERNAL MEDICINE

## 2023-02-26 PROCEDURE — 97530 THERAPEUTIC ACTIVITIES: CPT | Mod: CQ

## 2023-02-26 PROCEDURE — 94640 AIRWAY INHALATION TREATMENT: CPT

## 2023-02-26 PROCEDURE — 96372 THER/PROPH/DIAG INJ SC/IM: CPT

## 2023-02-26 PROCEDURE — 63600175 PHARM REV CODE 636 W HCPCS: Performed by: INTERNAL MEDICINE

## 2023-02-26 PROCEDURE — 97116 GAIT TRAINING THERAPY: CPT | Mod: CQ

## 2023-02-26 PROCEDURE — 25000003 PHARM REV CODE 250: Performed by: INTERNAL MEDICINE

## 2023-02-26 PROCEDURE — 85027 COMPLETE CBC AUTOMATED: CPT | Performed by: INTERNAL MEDICINE

## 2023-02-26 PROCEDURE — 21400001 HC TELEMETRY ROOM

## 2023-02-26 PROCEDURE — 94761 N-INVAS EAR/PLS OXIMETRY MLT: CPT

## 2023-02-26 PROCEDURE — 63600175 PHARM REV CODE 636 W HCPCS: Mod: TB,JG | Performed by: NURSE PRACTITIONER

## 2023-02-26 RX ORDER — PANTOPRAZOLE SODIUM 40 MG/1
40 TABLET, DELAYED RELEASE ORAL DAILY
Status: DISCONTINUED | OUTPATIENT
Start: 2023-02-26 | End: 2023-02-28 | Stop reason: HOSPADM

## 2023-02-26 RX ADMIN — BUDESONIDE 0.5 MG: 0.5 INHALANT ORAL at 07:02

## 2023-02-26 RX ADMIN — OXYCODONE AND ACETAMINOPHEN 1 TABLET: 7.5; 325 TABLET ORAL at 03:02

## 2023-02-26 RX ADMIN — GABAPENTIN 400 MG: 400 CAPSULE ORAL at 04:02

## 2023-02-26 RX ADMIN — SENNOSIDES AND DOCUSATE SODIUM 1 TABLET: 50; 8.6 TABLET ORAL at 09:02

## 2023-02-26 RX ADMIN — MEROPENEM AND SODIUM CHLORIDE 500 MG: 500 INJECTION, SOLUTION INTRAVENOUS at 08:02

## 2023-02-26 RX ADMIN — MONTELUKAST 10 MG: 10 TABLET, FILM COATED ORAL at 09:02

## 2023-02-26 RX ADMIN — IPRATROPIUM BROMIDE 0.5 MG: 0.5 SOLUTION RESPIRATORY (INHALATION) at 01:02

## 2023-02-26 RX ADMIN — DILTIAZEM HYDROCHLORIDE 120 MG: 120 CAPSULE, COATED, EXTENDED RELEASE ORAL at 08:02

## 2023-02-26 RX ADMIN — OXYCODONE AND ACETAMINOPHEN 1 TABLET: 7.5; 325 TABLET ORAL at 06:02

## 2023-02-26 RX ADMIN — TIZANIDINE 4 MG: 4 TABLET ORAL at 05:02

## 2023-02-26 RX ADMIN — PANTOPRAZOLE SODIUM 40 MG: 40 TABLET, DELAYED RELEASE ORAL at 12:02

## 2023-02-26 RX ADMIN — DOXYCYCLINE HYCLATE 100 MG: 100 TABLET, COATED ORAL at 09:02

## 2023-02-26 RX ADMIN — GABAPENTIN 400 MG: 400 CAPSULE ORAL at 08:02

## 2023-02-26 RX ADMIN — GABAPENTIN 400 MG: 400 CAPSULE ORAL at 09:02

## 2023-02-26 RX ADMIN — LEVOTHYROXINE SODIUM 125 MCG: 125 TABLET ORAL at 05:02

## 2023-02-26 RX ADMIN — IPRATROPIUM BROMIDE 0.5 MG: 0.5 SOLUTION RESPIRATORY (INHALATION) at 07:02

## 2023-02-26 RX ADMIN — MEROPENEM AND SODIUM CHLORIDE 500 MG: 500 INJECTION, SOLUTION INTRAVENOUS at 03:02

## 2023-02-26 RX ADMIN — GABAPENTIN 400 MG: 400 CAPSULE ORAL at 12:02

## 2023-02-26 RX ADMIN — DOXYCYCLINE HYCLATE 100 MG: 100 TABLET, COATED ORAL at 08:02

## 2023-02-26 RX ADMIN — TIZANIDINE 4 MG: 4 TABLET ORAL at 08:02

## 2023-02-26 RX ADMIN — ARFORMOTEROL TARTRATE 15 MCG: 15 SOLUTION RESPIRATORY (INHALATION) at 07:02

## 2023-02-26 RX ADMIN — ALPRAZOLAM 0.5 MG: 0.5 TABLET ORAL at 09:02

## 2023-02-26 RX ADMIN — OXYCODONE AND ACETAMINOPHEN 1 TABLET: 7.5; 325 TABLET ORAL at 10:02

## 2023-02-26 RX ADMIN — ENOXAPARIN SODIUM 40 MG: 40 INJECTION SUBCUTANEOUS at 04:02

## 2023-02-26 RX ADMIN — MEROPENEM AND SODIUM CHLORIDE 500 MG: 500 INJECTION, SOLUTION INTRAVENOUS at 09:02

## 2023-02-26 RX ADMIN — MEROPENEM AND SODIUM CHLORIDE 500 MG: 500 INJECTION, SOLUTION INTRAVENOUS at 02:02

## 2023-02-26 RX ADMIN — BUSPIRONE HYDROCHLORIDE 7.5 MG: 5 TABLET ORAL at 08:02

## 2023-02-26 RX ADMIN — CALCITRIOL CAPSULES 0.25 MCG 0.25 MCG: 0.25 CAPSULE ORAL at 09:02

## 2023-02-26 RX ADMIN — ALPRAZOLAM 0.5 MG: 0.5 TABLET ORAL at 06:02

## 2023-02-26 RX ADMIN — DILTIAZEM HYDROCHLORIDE 120 MG: 120 CAPSULE, COATED, EXTENDED RELEASE ORAL at 09:02

## 2023-02-26 NOTE — PLAN OF CARE
Pt remains free of injury throughout the shift, safety measures remain in place.   Poc reviewed with pt. Vss, no acute s/s of distress. Pain managed with PRN medication. Hourly rounding done. Chart reviwed, will cont with poc.       Problem: Infection (Pneumonia)  Goal: Resolution of Infection Signs and Symptoms  Outcome: Ongoing, Progressing     Problem: Respiratory Compromise (Pneumonia)  Goal: Effective Oxygenation and Ventilation  Outcome: Ongoing, Progressing     Problem: Impaired Wound Healing  Goal: Optimal Wound Healing  Outcome: Ongoing, Progressing     Problem: Pain Acute  Goal: Acceptable Pain Control and Functional Ability  Outcome: Ongoing, Progressing     Problem: Fall Injury Risk  Goal: Absence of Fall and Fall-Related Injury  Outcome: Ongoing, Progressing     Problem: Skin Injury Risk Increased  Goal: Skin Health and Integrity  Outcome: Ongoing, Progressing     Problem: Infection  Goal: Absence of Infection Signs and Symptoms  Outcome: Ongoing, Progressing

## 2023-02-26 NOTE — ASSESSMENT & PLAN NOTE
Increasing abdominal distension, ordered Abdominal US: multiple liver masses, ordered CT Liver: Multiple large hypoenhancing masses in the liver, largest on the right measuring 12 x 10 cm, largest on the left measuring 2.2 x 1.9 cm, compatible with metastatic disease. Hx of Thyroid Cancer    --Thyroglobulin Ab, AFP- to evaluate  -IR consulted for biopsy-  to be rescheduled Monday  -Consult Hem/Onc- following, recommend Bx of Liver  -patient to follow with primary Oncologist- advised patient to call her oncologist office in the AM to schedule follow-up and to update on changes

## 2023-02-26 NOTE — PLAN OF CARE
Pt Awake, Alert, and oriented to Person, Place, Time  , ambulated w/ assistance, remains free from falls/injuries this shift. Pt refused to turn in bed, pt states she prefers to sit up in bed. Safety precautions maintained. Pain managed with pain medication and rest. No s/s of acute distress noted. Continue with plan of care. Chart check complete.

## 2023-02-26 NOTE — PT/OT/SLP PROGRESS
"Physical Therapy  Treatment    Christine Horner   MRN: [id number]   Admitting Diagnosis: Pneumonia involving right lung    PT Received On: 02/26/23  PT Start Time: 1100     PT Stop Time: 1130    PT Total Time (min): 30 min       Billable Minutes:  Gait Training 15 and Therapeutic Activity 15    Treatment Type: Treatment  PT/PTA: PTA     PTA Visit Number: 1       General Precautions: Standard, fall  Orthopedic Precautions: N/A  Braces: N/A  Respiratory Status: Nasal cannula, flow 3 L/min    Spiritual, Cultural Beliefs, Rastafarian Practices, Values that Affect Care: no    Subjective:  Communicated with [facility] and nurse Jeannine prior to session  . +++PT WAS PREMEDICATED+++  "You came at the perfect time. I really want to try to walk but I haven't done it in months"     Pain/Comfort  Pain Rating 1: 0/10    Objective:   Patient found with: telemetry, peripheral IV, oxygen, PureWick    Functional Mobility:  Bed Mobility:     Supine to sit: MIN A   Sit to supine:MIN A   Rolling : MIN A    Transfers:    Sit to stand: MIN A   Stand to sit: CGA    Gait:     Remained near the bed for safety as pt stressed how long it had been since she walked: 4 feet fwd , 4 feet bwd , 4 feet fwd then 4 back. Sat and rested , did this 3 times total for a total of 24 feet.     MIN A for safety . Oxygen donned. Sob with reminders for pursed lip breathing.         Treatment and Education:  Pt required assistance to don and doff PJ pants for the session. Done in supine. Rolled and bridged with assistance. Took breaks as needed.      AM-PAC 6 CLICK MOBILITY  How much help from another person does this patient currently need?   1 = Unable, Total/Dependent Assistance  2 = A lot, Maximum/Moderate Assistance  3 = A little, Minimum/Contact Guard/Supervision  4 = None, Modified [place]/Independent    Turning over in bed (including adjusting bedclothes, sheets and blankets)?: 3  Sitting down on and standing up from a chair with arms (e.g., wheelchair, " bedside commode, etc.): 3  Moving from lying on back to sitting on the side of the bed?: 3  Moving to and from a bed to a chair (including a wheelchair)?: 3  Need to walk in hospital room?: 3  Climbing 3-5 steps with a railing?: 1  Basic Mobility Total Score: 16    AM-PAC Raw Score CMS G-Code Modifier Level of Impairment Assistance   6 % Total / Unable   7 - 9 CM 80 - 100% Maximal Assist   10 - 14 CL 60 - 80% Moderate Assist   15 - 19 CK 40 - 60% Moderate Assist   20 - 22 CJ 20 - 40% Minimal Assist   23 CI 1-20% SBA / CGA   24 CH 0% Independent/ Mod I     Patient left HOB elevated with all lines intact, call button in reach, and spouse present.    Assessment:  Christine Horner is a 63 y.o. female with a medical diagnosis of Pneumonia involving right lung and presents with significant improvement in physical capabilities and motivation today. Great progress. Pt was premedicated.     Rehab identified problem list/impairments: weakness, gait instability, decreased ROM, impaired cardiopulmonary response to activity, impaired endurance, impaired self care skills, impaired functional mobility, decreased safety awareness, decreased lower extremity function    Rehab potential is fair.    Activity tolerance: Fair    Discharge recommendations:  (TBD)      Barriers to discharge:      Equipment recommendations: none     GOALS:   Multidisciplinary Problems       Physical Therapy Goals          Problem: Physical Therapy    Goal Priority Disciplines Outcome Goal Variances Interventions   Physical Therapy Goal     PT, PT/OT Ongoing, Progressing     Description: Goals to be met by 3/7/23  Pt will complete bed mobility MOD I.  Pt will complete sit to stand MOD I.  Pt will ambulate 150ft MOD I using RW.                       PLAN:    Patient to be seen 3 x/week to address the above listed problems via gait training, therapeutic activities, therapeutic exercises  Plan of Care expires: 03/07/23  Plan of Care reviewed with:  patient, spouse         02/26/2023

## 2023-02-26 NOTE — ASSESSMENT & PLAN NOTE
Hx of thyroid cancer, surgical removal 1/2020, I-131 11/2020, followed by Dr. Feliciano at [facility].       --Continue levothyroxine  --Thyroglobulin Ab: elevated  --Patient to continue care with Dr. Feliciano, advised patient to call the office in the AM to scheduled follow-up

## 2023-02-26 NOTE — PLAN OF CARE
Pt is progressing with PT. She was premedicated for pain. Ambulated 8 feet , three times . Rested between each trial to recover. She need MIN A and a RW>

## 2023-02-26 NOTE — ASSESSMENT & PLAN NOTE
Patient reports early satiety, likely secondary to liver mass, possible malignancy    --Advised patient to limit fluids with meals to allow for more nutritional intake without becoming full from liquids  --Stop consuming liquids approx. 1 hr prior to meal times to allow the stomach to empty  --Eat multiple small meals per day (5-6) rather than waiting for 3 meals, this will allow for smaller portions spread out and may increase caloric intake  --Protonix 40mg PO Daily

## 2023-02-26 NOTE — ASSESSMENT & PLAN NOTE
Thyroidectomy: 1/2020, Radiation ablation: 11/2020, managed by Dr. Prieto at [facility]    --Continue levothyroxine

## 2023-02-26 NOTE — SUBJECTIVE & OBJECTIVE
Interval History: If stable following Bx, can discharge per IR recs. Labs stable, improved appetite. Early satiety reported per patient.     Review of Systems   Constitutional:  Positive for activity change, appetite change and fatigue. Negative for chills, diaphoresis, fever and unexpected weight change.   HENT:  Negative for congestion, hearing loss, mouth sores, postnasal drip, rhinorrhea, sore throat and trouble swallowing.    Eyes:  Negative for discharge and visual disturbance.   Respiratory:  Positive for shortness of breath. Negative for cough and chest tightness.    Cardiovascular:  Negative for chest pain, palpitations and leg swelling.   Gastrointestinal:  Positive for abdominal distention and abdominal pain. Negative for blood in stool, constipation, diarrhea, nausea and vomiting.   Endocrine: Negative for cold intolerance and heat intolerance.   Genitourinary:  Negative for difficulty urinating, dyspareunia, flank pain and hematuria.   Musculoskeletal:  Negative for arthralgias, back pain and myalgias.   Skin: Negative.    Neurological:  Positive for weakness. Negative for dizziness, light-headedness and headaches.   Hematological:  Negative for adenopathy. Does not bruise/bleed easily.   Psychiatric/Behavioral:  Negative for agitation, behavioral problems and confusion. The patient is nervous/anxious.    Objective:     Vital Signs (Most Recent):  Temp: 98.6 °F (37 °C) (02/26/23 1108)  Pulse: (!) 112 (02/26/23 1108)  Resp: 16 (02/26/23 1108)  BP: 116/76 (02/26/23 1108)  SpO2: (!) 93 % (02/26/23 1108)   Vital Signs (24h Range):  Temp:  [97.2 °F (36.2 °C)-98.6 °F (37 °C)] 98.6 °F (37 °C)  Pulse:  [] 112  Resp:  [16-20] 16  SpO2:  [93 %-97 %] 93 %  BP: (106-141)/(68-82) 116/76     Weight: 65.2 kg (143 lb 11.8 oz)  Body mass index is 23.92 kg/m².    Intake/Output Summary (Last 24 hours) at 2/26/2023 1118  Last data filed at 2/26/2023 0609  Gross per 24 hour   Intake 166.98 ml   Output 1550 ml   Net  -1383.02 ml      Physical Exam  Vitals and nursing note reviewed.   Constitutional:       General: She is not in acute distress.     Appearance: She is well-developed. She is ill-appearing.   HENT:      Head: Normocephalic and atraumatic.      Right Ear: Hearing and external ear normal.      Left Ear: Hearing and external ear normal.      Nose: No rhinorrhea.      Right Sinus: No maxillary sinus tenderness or frontal sinus tenderness.      Left Sinus: No maxillary sinus tenderness or frontal sinus tenderness.      Mouth/Throat:      Mouth: No oral lesions.      Pharynx: Uvula midline.   Eyes:      General:         Right eye: No discharge.         Left eye: No discharge.      Conjunctiva/sclera: Conjunctivae normal.      Pupils: Pupils are equal, round, and reactive to light.   Neck:      Thyroid: No thyromegaly.      Vascular: No carotid bruit.      Trachea: No tracheal deviation.   Cardiovascular:      Rate and Rhythm: Normal rate and regular rhythm.      Pulses:           Dorsalis pedis pulses are 2+ on the right side and 2+ on the left side.      Heart sounds: Normal heart sounds, S1 normal and S2 normal. No murmur heard.  Pulmonary:      Effort: Pulmonary effort is normal. No respiratory distress.      Breath sounds: Normal breath sounds.   Abdominal:      General: Bowel sounds are decreased. There is distension.      Palpations: Abdomen is soft. There is no mass.      Tenderness: There is generalized abdominal tenderness.   Musculoskeletal:         General: Normal range of motion.      Cervical back: Normal range of motion.   Lymphadenopathy:      Cervical: No cervical adenopathy.      Upper Body:      Right upper body: No supraclavicular adenopathy.      Left upper body: No supraclavicular adenopathy.   Skin:     General: Skin is warm and dry.      Capillary Refill: Capillary refill takes less than 2 seconds.      Findings: No rash.   Neurological:      Mental Status: She is alert and oriented to person,  place, and time.      Sensory: No sensory deficit.      Coordination: Coordination normal.      Gait: Gait normal.   Psychiatric:         Mood and Affect: Mood is not anxious or depressed.         Speech: Speech normal.         Behavior: Behavior normal.         Thought Content: Thought content normal.         Judgment: Judgment normal.       Significant Labs: All pertinent labs within the past 24 hours have been reviewed.  CBC:   Recent Labs   Lab 02/25/23  0511 02/26/23  0523   WBC 16.30* 19.39*   HGB 8.5* 8.6*   HCT 27.4* 28.0*    287     CMP:   Recent Labs   Lab 02/26/23 0523      K 4.2   CL 94*   CO2 31*   GLU 79   BUN 17   CREATININE 0.8   CALCIUM 7.4*   PROT 5.5*   ALBUMIN 2.4*   BILITOT 0.2   ALKPHOS 193*   AST 52*   ALT 13   ANIONGAP 14       Significant Imaging: I have reviewed all pertinent imaging results/findings within the past 24 hours.

## 2023-02-27 ENCOUNTER — TELEPHONE (OUTPATIENT)
Dept: RADIOLOGY | Facility: HOSPITAL | Age: 63
End: 2023-02-27

## 2023-02-27 LAB
ANISOCYTOSIS BLD QL SMEAR: SLIGHT
BASOPHILS # BLD AUTO: ABNORMAL K/UL (ref 0–0.2)
BASOPHILS NFR BLD: 0 % (ref 0–1.9)
DACRYOCYTES BLD QL SMEAR: ABNORMAL
DIFFERENTIAL METHOD: ABNORMAL
EOSINOPHIL # BLD AUTO: ABNORMAL K/UL (ref 0–0.5)
EOSINOPHIL NFR BLD: 2 % (ref 0–8)
ERYTHROCYTE [DISTWIDTH] IN BLOOD BY AUTOMATED COUNT: 15.6 % (ref 11.5–14.5)
HCT VFR BLD AUTO: 27.2 % (ref 37–48.5)
HGB BLD-MCNC: 8.5 G/DL (ref 12–16)
IMM GRANULOCYTES # BLD AUTO: ABNORMAL K/UL (ref 0–0.04)
IMM GRANULOCYTES NFR BLD AUTO: ABNORMAL % (ref 0–0.5)
LYMPHOCYTES # BLD AUTO: ABNORMAL K/UL (ref 1–4.8)
LYMPHOCYTES NFR BLD: 58 % (ref 18–48)
MCH RBC QN AUTO: 27.3 PG (ref 27–31)
MCHC RBC AUTO-ENTMCNC: 31.3 G/DL (ref 32–36)
MCV RBC AUTO: 88 FL (ref 82–98)
MONOCYTES # BLD AUTO: ABNORMAL K/UL (ref 0.3–1)
MONOCYTES NFR BLD: 3 % (ref 4–15)
NEUTROPHILS NFR BLD: 37 % (ref 38–73)
NRBC BLD-RTO: 0 /100 WBC
OVALOCYTES BLD QL SMEAR: ABNORMAL
PLATELET # BLD AUTO: 263 K/UL (ref 150–450)
PLATELET BLD QL SMEAR: ABNORMAL
PMV BLD AUTO: 9.7 FL (ref 9.2–12.9)
POIKILOCYTOSIS BLD QL SMEAR: SLIGHT
RBC # BLD AUTO: 3.11 M/UL (ref 4–5.4)
TARGETS BLD QL SMEAR: ABNORMAL
WBC # BLD AUTO: 19.62 K/UL (ref 3.9–12.7)

## 2023-02-27 PROCEDURE — 85027 COMPLETE CBC AUTOMATED: CPT | Performed by: INTERNAL MEDICINE

## 2023-02-27 PROCEDURE — 63600175 PHARM REV CODE 636 W HCPCS: Performed by: INTERNAL MEDICINE

## 2023-02-27 PROCEDURE — 94761 N-INVAS EAR/PLS OXIMETRY MLT: CPT

## 2023-02-27 PROCEDURE — 94640 AIRWAY INHALATION TREATMENT: CPT

## 2023-02-27 PROCEDURE — 25000003 PHARM REV CODE 250: Performed by: NURSE PRACTITIONER

## 2023-02-27 PROCEDURE — 25000242 PHARM REV CODE 250 ALT 637 W/ HCPCS: Performed by: INTERNAL MEDICINE

## 2023-02-27 PROCEDURE — 94799 UNLISTED PULMONARY SVC/PX: CPT

## 2023-02-27 PROCEDURE — 25000003 PHARM REV CODE 250: Performed by: INTERNAL MEDICINE

## 2023-02-27 PROCEDURE — 99900035 HC TECH TIME PER 15 MIN (STAT)

## 2023-02-27 PROCEDURE — 63600175 PHARM REV CODE 636 W HCPCS: Mod: TB,JG | Performed by: NURSE PRACTITIONER

## 2023-02-27 PROCEDURE — 21400001 HC TELEMETRY ROOM

## 2023-02-27 PROCEDURE — 63600175 PHARM REV CODE 636 W HCPCS: Performed by: RADIOLOGY

## 2023-02-27 PROCEDURE — 85007 BL SMEAR W/DIFF WBC COUNT: CPT | Performed by: INTERNAL MEDICINE

## 2023-02-27 PROCEDURE — 27000221 HC OXYGEN, UP TO 24 HOURS

## 2023-02-27 RX ORDER — FENTANYL CITRATE 50 UG/ML
INJECTION, SOLUTION INTRAMUSCULAR; INTRAVENOUS CODE/TRAUMA/SEDATION MEDICATION
Status: COMPLETED | OUTPATIENT
Start: 2023-02-27 | End: 2023-02-27

## 2023-02-27 RX ADMIN — FENTANYL CITRATE 25 MCG: 0.05 INJECTION, SOLUTION INTRAMUSCULAR; INTRAVENOUS at 03:02

## 2023-02-27 RX ADMIN — GABAPENTIN 400 MG: 400 CAPSULE ORAL at 01:02

## 2023-02-27 RX ADMIN — IPRATROPIUM BROMIDE 0.5 MG: 0.5 SOLUTION RESPIRATORY (INHALATION) at 07:02

## 2023-02-27 RX ADMIN — DILTIAZEM HYDROCHLORIDE 120 MG: 120 CAPSULE, COATED, EXTENDED RELEASE ORAL at 10:02

## 2023-02-27 RX ADMIN — OXYCODONE AND ACETAMINOPHEN 1 TABLET: 7.5; 325 TABLET ORAL at 04:02

## 2023-02-27 RX ADMIN — TIZANIDINE 4 MG: 4 TABLET ORAL at 05:02

## 2023-02-27 RX ADMIN — ARFORMOTEROL TARTRATE 15 MCG: 15 SOLUTION RESPIRATORY (INHALATION) at 07:02

## 2023-02-27 RX ADMIN — TIZANIDINE 4 MG: 4 TABLET ORAL at 09:02

## 2023-02-27 RX ADMIN — LEVOTHYROXINE SODIUM 125 MCG: 125 TABLET ORAL at 05:02

## 2023-02-27 RX ADMIN — BUSPIRONE HYDROCHLORIDE 7.5 MG: 5 TABLET ORAL at 08:02

## 2023-02-27 RX ADMIN — OXYCODONE AND ACETAMINOPHEN 1 TABLET: 7.5; 325 TABLET ORAL at 12:02

## 2023-02-27 RX ADMIN — MEROPENEM AND SODIUM CHLORIDE 500 MG: 500 INJECTION, SOLUTION INTRAVENOUS at 03:02

## 2023-02-27 RX ADMIN — ALPRAZOLAM 0.5 MG: 0.5 TABLET ORAL at 11:02

## 2023-02-27 RX ADMIN — OXYCODONE AND ACETAMINOPHEN 1 TABLET: 7.5; 325 TABLET ORAL at 10:02

## 2023-02-27 RX ADMIN — BUDESONIDE 0.5 MG: 0.5 INHALANT ORAL at 07:02

## 2023-02-27 RX ADMIN — ALPRAZOLAM 0.5 MG: 0.5 TABLET ORAL at 12:02

## 2023-02-27 RX ADMIN — DOXYCYCLINE HYCLATE 100 MG: 100 TABLET, COATED ORAL at 10:02

## 2023-02-27 RX ADMIN — BUSPIRONE HYDROCHLORIDE 7.5 MG: 5 TABLET ORAL at 10:02

## 2023-02-27 RX ADMIN — GABAPENTIN 400 MG: 400 CAPSULE ORAL at 04:02

## 2023-02-27 RX ADMIN — MEROPENEM AND SODIUM CHLORIDE 500 MG: 500 INJECTION, SOLUTION INTRAVENOUS at 04:02

## 2023-02-27 RX ADMIN — SENNOSIDES AND DOCUSATE SODIUM 1 TABLET: 50; 8.6 TABLET ORAL at 10:02

## 2023-02-27 RX ADMIN — OXYCODONE AND ACETAMINOPHEN 1 TABLET: 7.5; 325 TABLET ORAL at 08:02

## 2023-02-27 RX ADMIN — ENOXAPARIN SODIUM 40 MG: 40 INJECTION SUBCUTANEOUS at 04:02

## 2023-02-27 RX ADMIN — IPRATROPIUM BROMIDE 0.5 MG: 0.5 SOLUTION RESPIRATORY (INHALATION) at 01:02

## 2023-02-27 RX ADMIN — ALPRAZOLAM 0.5 MG: 0.5 TABLET ORAL at 08:02

## 2023-02-27 RX ADMIN — GABAPENTIN 400 MG: 400 CAPSULE ORAL at 08:02

## 2023-02-27 RX ADMIN — CALCITRIOL CAPSULES 0.25 MCG 0.25 MCG: 0.25 CAPSULE ORAL at 10:02

## 2023-02-27 RX ADMIN — MEROPENEM AND SODIUM CHLORIDE 500 MG: 500 INJECTION, SOLUTION INTRAVENOUS at 10:02

## 2023-02-27 RX ADMIN — DILTIAZEM HYDROCHLORIDE 120 MG: 120 CAPSULE, COATED, EXTENDED RELEASE ORAL at 08:02

## 2023-02-27 RX ADMIN — GABAPENTIN 400 MG: 400 CAPSULE ORAL at 10:02

## 2023-02-27 RX ADMIN — PANTOPRAZOLE SODIUM 40 MG: 40 TABLET, DELAYED RELEASE ORAL at 10:02

## 2023-02-27 RX ADMIN — MONTELUKAST 10 MG: 10 TABLET, FILM COATED ORAL at 10:02

## 2023-02-27 NOTE — ASSESSMENT & PLAN NOTE
Increasing abdominal distension, ordered Abdominal US: multiple liver masses, ordered CT Liver: Multiple large hypoenhancing masses in the liver, largest on the right measuring 12 x 10 cm, largest on the left measuring 2.2 x 1.9 cm, compatible with metastatic disease. Hx of Thyroid Cancer    --Thyroglobulin Ab, AFP- to evaluate  -IR consulted for biopsy-  to be rescheduled Monday  -Consult Hem/Onc- following, recommend Bx of Liver  -patient to follow with primary Oncologist- advised patient to call her oncologist office in the AM to schedule follow-up and to update on changes    Awaiting liver biopsy

## 2023-02-27 NOTE — ASSESSMENT & PLAN NOTE
Hx of thyroid cancer, surgical removal 1/2020, I-131 11/2020, followed by Dr. Feliciano at [facility].       --Continue levothyroxine  --Thyroglobulin Ab: elevated  --Patient to continue care with Dr. Feliciano, advised patient to call the office in the AM to scheduled follow-up

## 2023-02-27 NOTE — PROGRESS NOTES
[facility] [place] 2[place] Medicine  Progress Note    Patient Name: Christine Horner  MRN: [id number]  Patient Class: IP- Inpatient   Admission Date: 2/20/2023  Length of Stay: 7 days  Attending Physician: Ike Allen MD  Primary Care Provider: Eloy Hdez MD        Subjective:     Principal Problem:Pneumonia involving right lung        HPI:  The patient is a 62 yo female with past medical history of anemia, arthritis, COPD, chronic respiratory failure on home oxygen, pneumonia and chronic back pain who presented to the ED with increased oxygen requirements. She is usually on 3L nasal cannula. She was discharged from the hospital last week on Levaquin for pneumonia. Home health went to see patient today and found her oxygen saturation to be 82% on 3L.  Her flow was increased to 5L with slow improvement to low 90s. She was instructed to come to the ED for further evaluation. She reports she was tired and weak over the weekend. Workup in the ED with increasing density in right lung on CT chest. She reports cough is becoming productive. The sputum is yellow greenish color. Previously cough was not productive.Hospital medicine consulted for admission. Discussed CT findings with patient.       Overview/Hospital Course:  63 year old female, under the management of Hospital Medicine for pneumonia, acute on chronic respiratory failure. Patient O2 requirements decreased overnight, currently at baseline with 3L NC, SpO2 stable: 95%. Currently treated with Doxycycline and Merrem due to Hx of respiratory pseudomonas (11/22). Sputum culture ordered, patient has not been able to produce sputum for culture, has been given 3% saline nebs to assist without success. BC: NGTD. PT/OT ordered. Patient with significant abdominal distension, KUB non-contributory. Abdominal US: concerning for liver masses. Ordered triple-phase CT of Liver: Multiple large hypoenhancing masses in the liver, largest on the right measuring 12 x 10 cm,  largest on the left measuring 2.2 x 1.9 cm, compatible with metastatic disease. Ordered AFP, consulted IR for possible Biopsy, consulted Hem/Onc. Discussed with patient, has Hx of Thyroid Cancer (2020), treated with thyroidectomy 1/2020, I-131 11/2020. Ordered Thyroglobulin Ab: elevated, AFP: normal. On 2/24/23, IR consulted for biopsy, plan for Monday, NPO after midnight.  Hematology-Oncology consult-awaiting tissue confirmation.  PT/OT evaluations completed with home health recommended.  Social work consulted to establish home health prior to discharge.    2/27 awaiting ir biopsy of liver mass. Anticipate discharge tomorrow with close follow up outpatient with primary oncologist.      Interval History: See hospital course for today      Review of Systems   Constitutional:  Positive for activity change and appetite change (hungry). Negative for fatigue and fever.   Respiratory:  Positive for shortness of breath (improved).    Gastrointestinal:  Negative for nausea and vomiting.   Neurological:  Positive for weakness.   Psychiatric/Behavioral:  The patient is nervous/anxious.    Objective:     Vital Signs (Most Recent):  Temp: 97.6 °F (36.4 °C) (02/27/23 1115)  Pulse: 81 (02/27/23 1344)  Resp: 18 (02/27/23 1344)  BP: 93/70 (02/27/23 1115)  SpO2: 98 % (02/27/23 1344) Vital Signs (24h Range):  Temp:  [96.1 °F (35.6 °C)-98 °F (36.7 °C)] 97.6 °F (36.4 °C)  Pulse:  [] 81  Resp:  [16-20] 18  SpO2:  [91 %-98 %] 98 %  BP: ()/(64-74) 93/70     Weight: 65.2 kg (143 lb 11.8 oz)  Body mass index is 23.92 kg/m².    Intake/Output Summary (Last 24 hours) at 2/27/2023 1413  Last data filed at 2/27/2023 0801  Gross per 24 hour   Intake 247.97 ml   Output 2700 ml   Net -2452.03 ml      Physical Exam  Vitals and nursing note reviewed.   Constitutional:       General: She is not in acute distress.     Appearance: She is ill-appearing. She is not toxic-appearing.      Interventions: Nasal cannula in place.   HENT:      Head:  Normocephalic and atraumatic.   Cardiovascular:      Rate and Rhythm: Normal rate.   Pulmonary:      Effort: Tachypnea and respiratory distress present.      Breath sounds: Decreased air movement present.   Abdominal:      Palpations: Abdomen is soft.      Tenderness: There is no abdominal tenderness.   Musculoskeletal:      Right lower leg: No edema.      Left lower leg: No edema.   Skin:     General: Skin is warm.      Coloration: Skin is pale.      Findings: Bruising present.   Neurological:      Mental Status: She is alert and oriented to person, place, and time.      Motor: Weakness present.   Psychiatric:         Mood and Affect: Mood is anxious.         Speech: Speech normal.         Behavior: Behavior is cooperative.       Significant Labs: All pertinent labs within the past 24 hours have been reviewed.  CBC:   Recent Labs   Lab 02/26/23 0523 02/27/23  0506   WBC 19.39* 19.62*   HGB 8.6* 8.5*   HCT 28.0* 27.2*    263     CMP:   Recent Labs   Lab 02/26/23  0523      K 4.2   CL 94*   CO2 31*   GLU 79   BUN 17   CREATININE 0.8   CALCIUM 7.4*   PROT 5.5*   ALBUMIN 2.4*   BILITOT 0.2   ALKPHOS 193*   AST 52*   ALT 13   ANIONGAP 14       Significant Imaging: I have reviewed all pertinent imaging results/findings within the past 24 hours.  CXR: I have reviewed all pertinent results/findings within the past 24 hours and my personal findings are:  mass like density      Assessment/Plan:      * Pneumonia involving right lung  Previously treated for Pseudomonal pneumonia  Continue meropenem and doxycycline  f/u sputum culture- unable to collect  Appreciate pulmonology    Early satiety  Patient reports early satiety, likely secondary to liver mass, possible malignancy    --Advised patient to limit fluids with meals to allow for more nutritional intake without becoming full from liquids  --Stop consuming liquids approx. 1 hr prior to meal times to allow the stomach to empty  --Eat multiple small meals per  day (5-6) rather than waiting for 3 meals, this will allow for smaller portions spread out and may increase caloric intake  --Protonix 40mg PO Daily    Liver mass  Increasing abdominal distension, ordered Abdominal US: multiple liver masses, ordered CT Liver: Multiple large hypoenhancing masses in the liver, largest on the right measuring 12 x 10 cm, largest on the left measuring 2.2 x 1.9 cm, compatible with metastatic disease. Hx of Thyroid Cancer    --Thyroglobulin Ab, AFP- to evaluate  -IR consulted for biopsy-  to be rescheduled Monday  -Consult Hem/Onc- following, recommend Bx of Liver  -patient to follow with primary Oncologist- advised patient to call her oncologist office in the AM to schedule follow-up and to update on changes    Awaiting liver biopsy      Papillary thyroid carcinoma  Thyroidectomy: 1/2020, Radiation ablation: 11/2020, managed by Dr. Prieto at [facility]    --Continue levothyroxine      CLL (chronic lymphocytic leukemia)  Leukocytosis secondary to CLL, Followed by outside heme-onc    --Daily CBC        Postablative hypothyroidism  Hx of thyroid cancer, surgical removal 1/2020, I-131 11/2020, followed by Dr. Feliciano at [facility].       --Continue levothyroxine  --Thyroglobulin Ab: elevated  --Patient to continue care with Dr. Feliciano, advised patient to call the office in the AM to scheduled follow-up      Paroxysmal SVT (supraventricular tachycardia)  Continue home medications  Heart rate currently controlled      Acute on chronic respiratory failure with hypoxia  Patient with Hypercapnic and Hypoxic Respiratory failure which is Acute on chronic.  she is on home oxygen at 3 LPM. Supplemental oxygen was provided and noted- Oxygen Concentration (%):  [32] 32.   Signs/symptoms of respiratory failure include- tachypnea, increased work of breathing and lethargy. Contributing diagnoses includes - COPD, Pleural effusion and Pneumonia Labs and images were reviewed. Patient Has recent ABG, which has been  reviewed. Will treat underlying causes and adjust management of respiratory failure as follows- IV antibiotics, supplemental oxygen, prn breathing treatments  -Pulmonology following  On baseline supplemental oxygen needs      VTE Risk Mitigation (From admission, onward)         Ordered     enoxaparin injection 40 mg  Daily         02/20/23 1656     IP VTE HIGH RISK PATIENT  Once         02/20/23 1656     Place sequential compression device  Until discontinued         02/20/23 1656                Discharge Planning   MARK: 2/27/2023     Code Status: Full Code   Is the patient medically ready for discharge?:     Reason for patient still in hospital (select all that apply): Patient trending condition, Laboratory test, Treatment, Imaging, Consult recommendations and Pending disposition  Discharge Plan A: Home Health   Discharge Delays: None known at this time              Ike Allen MD  Department of Hospital Medicine   '[facility] - Med Surg

## 2023-02-27 NOTE — SUBJECTIVE & OBJECTIVE
Interval History: See hospital course for today      Review of Systems   Constitutional:  Positive for activity change and appetite change (hungry). Negative for fatigue and fever.   Respiratory:  Positive for shortness of breath (improved).    Gastrointestinal:  Negative for nausea and vomiting.   Neurological:  Positive for weakness.   Psychiatric/Behavioral:  The patient is nervous/anxious.    Objective:     Vital Signs (Most Recent):  Temp: 97.6 °F (36.4 °C) (02/27/23 1115)  Pulse: 81 (02/27/23 1344)  Resp: 18 (02/27/23 1344)  BP: 93/70 (02/27/23 1115)  SpO2: 98 % (02/27/23 1344) Vital Signs (24h Range):  Temp:  [96.1 °F (35.6 °C)-98 °F (36.7 °C)] 97.6 °F (36.4 °C)  Pulse:  [] 81  Resp:  [16-20] 18  SpO2:  [91 %-98 %] 98 %  BP: ()/(64-74) 93/70     Weight: 65.2 kg (143 lb 11.8 oz)  Body mass index is 23.92 kg/m².    Intake/Output Summary (Last 24 hours) at 2/27/2023 1413  Last data filed at 2/27/2023 0801  Gross per 24 hour   Intake 247.97 ml   Output 2700 ml   Net -2452.03 ml      Physical Exam  Vitals and nursing note reviewed.   Constitutional:       General: She is not in acute distress.     Appearance: She is ill-appearing. She is not toxic-appearing.      Interventions: Nasal cannula in place.   HENT:      Head: Normocephalic and atraumatic.   Cardiovascular:      Rate and Rhythm: Normal rate.   Pulmonary:      Effort: Tachypnea and respiratory distress present.      Breath sounds: Decreased air movement present.   Abdominal:      Palpations: Abdomen is soft.      Tenderness: There is no abdominal tenderness.   Musculoskeletal:      Right lower leg: No edema.      Left lower leg: No edema.   Skin:     General: Skin is warm.      Coloration: Skin is pale.      Findings: Bruising present.   Neurological:      Mental Status: She is alert and oriented to person, place, and time.      Motor: Weakness present.   Psychiatric:         Mood and Affect: Mood is anxious.         Speech: Speech normal.          Behavior: Behavior is cooperative.       Significant Labs: All pertinent labs within the past 24 hours have been reviewed.  CBC:   Recent Labs   Lab 02/26/23  0523 02/27/23  0506   WBC 19.39* 19.62*   HGB 8.6* 8.5*   HCT 28.0* 27.2*    263     CMP:   Recent Labs   Lab 02/26/23  0523      K 4.2   CL 94*   CO2 31*   GLU 79   BUN 17   CREATININE 0.8   CALCIUM 7.4*   PROT 5.5*   ALBUMIN 2.4*   BILITOT 0.2   ALKPHOS 193*   AST 52*   ALT 13   ANIONGAP 14       Significant Imaging: I have reviewed all pertinent imaging results/findings within the past 24 hours.  CXR: I have reviewed all pertinent results/findings within the past 24 hours and my personal findings are:  mass like density

## 2023-02-27 NOTE — PLAN OF CARE
IV ABT therapy remains in progress. No adverse reactions noted, remains afebrile. 3L/NC. Remains NPO for procedure. PRN pain meds adm as needed. Deep breathing/incentive spirometer use encouraged. Remains free from incident/injury. Call light in reach. All active orders reviewed. Chart check complete.

## 2023-02-27 NOTE — PLAN OF CARE
Guille attempted to contact Dr. Yanez's office to schedule follow up for path results; Guille left a message for MD's Nurse. Guille provided her name and contact number for a return call once available.

## 2023-02-27 NOTE — ASSESSMENT & PLAN NOTE
Patient with Hypoxic Respiratory failure which is Acute on chronic.  she is on home oxygen at 3 LPM. Supplemental oxygen was provided and noted- Oxygen Concentration (%):  [32] 32.   Signs/symptoms of respiratory failure include- worseing hypoxia on home flow O2. Contributing diagnoses includes - Pneumonia Labs and images were reviewed. Patient Has not had a recent ABG. Will treat underlying causes and adjust management of respiratory failure as follows    -on 3l nc currently , home 02  -on nebs and antibiotics

## 2023-02-27 NOTE — ASSESSMENT & PLAN NOTE
- previous pseudomonas pneumonia in Nov 2022, admitted earlier this month for pneumonia and was discharge on levaquin, pt returned to the ER 2/20 for increased O2 needs per her home ritika team   -on angi  -await liver biopsy   -need close f/u of imaging , rounded area ? Underlying mass

## 2023-02-27 NOTE — SUBJECTIVE & OBJECTIVE
Interval History: f/u possible pneumonia  No acute events  On 3l nc , on 3l nc   No complaints      Objective:     Vital Signs (Most Recent):  Temp: 97.6 °F (36.4 °C) (02/27/23 1115)  Pulse: 81 (02/27/23 1115)  Resp: 17 (02/27/23 1115)  BP: 93/70 (02/27/23 1115)  SpO2: 95 % (02/27/23 1115) Vital Signs (24h Range):  Temp:  [96.1 °F (35.6 °C)-98 °F (36.7 °C)] 97.6 °F (36.4 °C)  Pulse:  [] 81  Resp:  [16-20] 17  SpO2:  [91 %-95 %] 95 %  BP: ()/(64-74) 93/70     Weight: 65.2 kg (143 lb 11.8 oz)  Body mass index is 23.92 kg/m².      Intake/Output Summary (Last 24 hours) at 2/27/2023 1239  Last data filed at 2/27/2023 0801  Gross per 24 hour   Intake 247.97 ml   Output 2700 ml   Net -2452.03 ml       Physical Exam  Vitals and nursing note reviewed.   Constitutional:       General: She is not in acute distress.  HENT:      Head: Normocephalic and atraumatic.   Cardiovascular:      Rate and Rhythm: Normal rate and regular rhythm.   Pulmonary:      Effort: No respiratory distress.      Comments: Occ wheeze  Abdominal:      General: Abdomen is flat. Bowel sounds are normal.      Palpations: Abdomen is soft.   Musculoskeletal:         General: Swelling present. No tenderness.   Neurological:      General: No focal deficit present.      Mental Status: She is alert and oriented to person, place, and time.     Review of Systems    Vents:  Oxygen Concentration (%): 32 (02/27/23 0708)    Lines/Drains/Airways       Peripheral Intravenous Line  Duration                  Midline Catheter Insertion/Assessment  - Single Lumen 02/24/23 1422 Left basilic vein (medial side of arm) other (see comments) 2 days                    Significant Labs:    CBC/Anemia Profile:  Recent Labs   Lab 02/26/23  0523 02/27/23  0506   WBC 19.39* 19.62*   HGB 8.6* 8.5*   HCT 28.0* 27.2*    263   MCV 87 88   RDW 15.5* 15.6*        Chemistries:  Recent Labs   Lab 02/26/23  0523      K 4.2   CL 94*   CO2 31*   BUN 17   CREATININE 0.8    CALCIUM 7.4*   ALBUMIN 2.4*   PROT 5.5*   BILITOT 0.2   ALKPHOS 193*   ALT 13   AST 52*       All pertinent labs within the past 24 hours have been reviewed.    Significant Imaging:  I have reviewed all pertinent imaging results/findings within the past 24 hours.  CT: I have reviewed all pertinent results/findings within the past 24 hours and my personal findings are:  reviewed CT as well

## 2023-02-27 NOTE — ASSESSMENT & PLAN NOTE
Patient with Hypercapnic and Hypoxic Respiratory failure which is Acute on chronic.  she is on home oxygen at 3 LPM. Supplemental oxygen was provided and noted- Oxygen Concentration (%):  [32] 32.   Signs/symptoms of respiratory failure include- tachypnea, increased work of breathing and lethargy. Contributing diagnoses includes - COPD, Pleural effusion and Pneumonia Labs and images were reviewed. Patient Has recent ABG, which has been reviewed. Will treat underlying causes and adjust management of respiratory failure as follows- IV antibiotics, supplemental oxygen, prn breathing treatments  -Pulmonology following  On baseline supplemental oxygen needs

## 2023-02-27 NOTE — PLAN OF CARE
Plan of care reviewed with patient with verbal understanding. Chart and orders reviewed.  Pt remains free from falls this shift, Safety precautions in place.   Pt currently resting comfortably in bed. Pain controlled with po pain med (see emar for pain admin).  Hourly rounding with bed in lowest position, side rails up, call light in reach.  Will continue to monitor until end of shift.       Problem: Skin Injury Risk Increased  Goal: Skin Health and Integrity  Outcome: Ongoing, Progressing     Problem: Anxiety  Goal: Anxiety Reduction or Resolution  Outcome: Ongoing, Progressing

## 2023-02-27 NOTE — PROGRESS NOTES
[facility]  Pulmonology  Progress Note    Patient Name: Christine Horner  MRN: [id number]  Admission Date: 2/20/2023  Hospital Length of Stay: 7 days  Code Status: Full Code  Attending Provider: Ike Allen MD  Primary Care Provider: Eloy Hdez MD   Principal Problem: Pneumonia involving right lung    Subjective:     Interval History: f/u possible pneumonia  No acute events  On 3l nc , on 3l nc   No complaints      Objective:     Vital Signs (Most Recent):  Temp: 97.6 °F (36.4 °C) (02/27/23 1115)  Pulse: 81 (02/27/23 1115)  Resp: 17 (02/27/23 1115)  BP: 93/70 (02/27/23 1115)  SpO2: 95 % (02/27/23 1115) Vital Signs (24h Range):  Temp:  [96.1 °F (35.6 °C)-98 °F (36.7 °C)] 97.6 °F (36.4 °C)  Pulse:  [] 81  Resp:  [16-20] 17  SpO2:  [91 %-95 %] 95 %  BP: ()/(64-74) 93/70     Weight: 65.2 kg (143 lb 11.8 oz)  Body mass index is 23.92 kg/m².      Intake/Output Summary (Last 24 hours) at 2/27/2023 1239  Last data filed at 2/27/2023 0801  Gross per 24 hour   Intake 247.97 ml   Output 2700 ml   Net -2452.03 ml       Physical Exam  Vitals and nursing note reviewed.   Constitutional:       General: She is not in acute distress.  HENT:      Head: Normocephalic and atraumatic.   Cardiovascular:      Rate and Rhythm: Normal rate and regular rhythm.   Pulmonary:      Effort: No respiratory distress.      Comments: Occ wheeze  Abdominal:      General: Abdomen is flat. Bowel sounds are normal.      Palpations: Abdomen is soft.   Musculoskeletal:         General: Swelling present. No tenderness.   Neurological:      General: No focal deficit present.      Mental Status: She is alert and oriented to person, place, and time.     Review of Systems    Vents:  Oxygen Concentration (%): 32 (02/27/23 0708)    Lines/Drains/Airways       Peripheral Intravenous Line  Duration                  Midline Catheter Insertion/Assessment  - Single Lumen 02/24/23 1422 Left basilic vein (medial side of arm) other (see comments)  2 days                    Significant Labs:    CBC/Anemia Profile:  Recent Labs   Lab 02/26/23  0523 02/27/23  0506   WBC 19.39* 19.62*   HGB 8.6* 8.5*   HCT 28.0* 27.2*    263   MCV 87 88   RDW 15.5* 15.6*        Chemistries:  Recent Labs   Lab 02/26/23  0523      K 4.2   CL 94*   CO2 31*   BUN 17   CREATININE 0.8   CALCIUM 7.4*   ALBUMIN 2.4*   PROT 5.5*   BILITOT 0.2   ALKPHOS 193*   ALT 13   AST 52*       All pertinent labs within the past 24 hours have been reviewed.    Significant Imaging:  I have reviewed all pertinent imaging results/findings within the past 24 hours.  CT: I have reviewed all pertinent results/findings within the past 24 hours and my personal findings are:  reviewed CT as well       ABG  Recent Labs   Lab 02/20/23  1430   PH 7.425   PO2 72*   PCO2 60.1*   HCO3 39.5*   BE 15     Assessment/Plan:     Pulmonary  * Pneumonia involving right lung  - previous pseudomonas pneumonia in Nov 2022, admitted earlier this month for pneumonia and was discharge on levaquin, pt returned to the ER 2/20 for increased O2 needs per her home ritika team   -on angi  -await liver biopsy   -need close f/u of imaging , rounded area ? Underlying mass     Acute on chronic respiratory failure with hypoxia  Patient with Hypoxic Respiratory failure which is Acute on chronic.  she is on home oxygen at 3 LPM. Supplemental oxygen was provided and noted- Oxygen Concentration (%):  [32] 32.   Signs/symptoms of respiratory failure include- worseing hypoxia on home flow O2. Contributing diagnoses includes - Pneumonia Labs and images were reviewed. Patient Has not had a recent ABG. Will treat underlying causes and adjust management of respiratory failure as follows    -on 3l nc currently , home 02  -on nebs and antibiotics       Oncology  Papillary thyroid carcinoma  Seen by du   Followed by Dr. Prieto as an outpt     CLL (chronic lymphocytic leukemia)  See by du  Has outpt heme/onc    GI  Liver mass  Plan  biopsy           Suzanen Brar MD  Pulmonology  [facility]

## 2023-02-27 NOTE — ASSESSMENT & PLAN NOTE
Thyroidectomy: 1/2020, Radiation ablation: 11/2020, managed by Dr. Prieto at [facility]    --Continue levothyroxine

## 2023-02-27 NOTE — OP NOTE
Pre Op Diagnosis: liver lesion     Post Op Diagnosis: same     Procedure:  biopsy     Procedure performed by: Zulay REIS, Leydi ZABALA     Written Informed Consent Obtained: Yes     Specimen Removed:  yes     Estimated Blood Loss:  minimal     Findings: Local anesthesia and moderate sedation were used.     The patient tolerated the procedure well and there were no complications.      Disposition:  F/U in clinic or with ordering physician    Discharge instructions:  Light activity for 24 hours.  Remove band aid in 24 hours.  No baths (showers are appropriate).      Sterile technique was performed in the RLQ, lidocaine was used as a local anesthetic.  Multiple samples taken percutaneously from the liver mass.  Pt tolerated the procedure well without immediate complications.  Please see radiologist report for details. F/u with PCP and/or ordering physician.

## 2023-02-27 NOTE — PLAN OF CARE
[facility] - Med Surg  Discharge Final Note    Primary Care Provider: Eloy Hdez MD    Expected Discharge Date: 2/27/2023    Final Discharge Note (most recent)       Final Note - 02/27/23 1512          Final Note    Assessment Type Final Discharge Note     Anticipated Discharge Disposition Home-Health Care [facility]     Hospital Resources/Appts/Education Provided Community resources provided        Post-Acute Status    Post-Acute Authorization Home Health     Home Health Status Set-up Complete/Auth obtained     Discharge Delays None known at this time                       Follow-up providers       Bella Yanez MD   Specialty: Hematology and Oncology    [address] [address]   Phone: [phone]       Next Steps: Call    Instructions: Please follow up with Dr. Yanez's office to schedule a follow up regarding pathology results.    Dr. Yanez's Nurse will contact patient to schedule hospital follow up appointment.              After-discharge care                Home Medical Care       [facility]   Service: Home Health Services    [address]  [place] [address]   Phone: [phone]                             [facility] arranged.     Sw spoke with Dr. Yanez's Nurse who stated she will follow up with MD and then contact patient to schedule a hospital follow up for pathology results.     Pt to schedule PCP appointment, as needed, since pt follows with a non-[facility] provider.     No additional d/c needs at this time.

## 2023-02-28 VITALS
RESPIRATION RATE: 18 BRPM | HEIGHT: 65 IN | DIASTOLIC BLOOD PRESSURE: 58 MMHG | HEART RATE: 82 BPM | SYSTOLIC BLOOD PRESSURE: 107 MMHG | TEMPERATURE: 98 F | BODY MASS INDEX: 23.95 KG/M2 | WEIGHT: 143.75 LBS | OXYGEN SATURATION: 97 %

## 2023-02-28 LAB
ANISOCYTOSIS BLD QL SMEAR: SLIGHT
BASOPHILS NFR BLD: 0 % (ref 0–1.9)
DIFFERENTIAL METHOD: ABNORMAL
EOSINOPHIL NFR BLD: 6 % (ref 0–8)
ERYTHROCYTE [DISTWIDTH] IN BLOOD BY AUTOMATED COUNT: 15.5 % (ref 11.5–14.5)
HCT VFR BLD AUTO: 25.1 % (ref 37–48.5)
HGB BLD-MCNC: 7.7 G/DL (ref 12–16)
IMM GRANULOCYTES # BLD AUTO: ABNORMAL K/UL (ref 0–0.04)
IMM GRANULOCYTES NFR BLD AUTO: ABNORMAL % (ref 0–0.5)
LYMPHOCYTES NFR BLD: 31 % (ref 18–48)
MCH RBC QN AUTO: 26.9 PG (ref 27–31)
MCHC RBC AUTO-ENTMCNC: 30.7 G/DL (ref 32–36)
MCV RBC AUTO: 88 FL (ref 82–98)
MONOCYTES NFR BLD: 4 % (ref 4–15)
NEUTROPHILS NFR BLD: 59 % (ref 38–73)
NRBC BLD-RTO: 0 /100 WBC
OVALOCYTES BLD QL SMEAR: ABNORMAL
PLATELET # BLD AUTO: 199 K/UL (ref 150–450)
PLATELET BLD QL SMEAR: ABNORMAL
PMV BLD AUTO: 10.6 FL (ref 9.2–12.9)
POIKILOCYTOSIS BLD QL SMEAR: SLIGHT
POLYCHROMASIA BLD QL SMEAR: ABNORMAL
RBC # BLD AUTO: 2.86 M/UL (ref 4–5.4)
SMUDGE CELLS BLD QL SMEAR: PRESENT
WBC # BLD AUTO: 13.95 K/UL (ref 3.9–12.7)

## 2023-02-28 PROCEDURE — 94640 AIRWAY INHALATION TREATMENT: CPT

## 2023-02-28 PROCEDURE — 85007 BL SMEAR W/DIFF WBC COUNT: CPT | Performed by: INTERNAL MEDICINE

## 2023-02-28 PROCEDURE — 27000221 HC OXYGEN, UP TO 24 HOURS

## 2023-02-28 PROCEDURE — 97530 THERAPEUTIC ACTIVITIES: CPT

## 2023-02-28 PROCEDURE — 25000242 PHARM REV CODE 250 ALT 637 W/ HCPCS: Performed by: INTERNAL MEDICINE

## 2023-02-28 PROCEDURE — 97530 THERAPEUTIC ACTIVITIES: CPT | Mod: CQ

## 2023-02-28 PROCEDURE — 25000003 PHARM REV CODE 250: Performed by: INTERNAL MEDICINE

## 2023-02-28 PROCEDURE — 94761 N-INVAS EAR/PLS OXIMETRY MLT: CPT

## 2023-02-28 PROCEDURE — 25000003 PHARM REV CODE 250: Performed by: NURSE PRACTITIONER

## 2023-02-28 PROCEDURE — 85027 COMPLETE CBC AUTOMATED: CPT | Performed by: INTERNAL MEDICINE

## 2023-02-28 RX ORDER — ONDANSETRON 4 MG/1
4 TABLET, ORALLY DISINTEGRATING ORAL EVERY 8 HOURS PRN
Qty: 10 TABLET | Refills: 0 | Status: SHIPPED | OUTPATIENT
Start: 2023-02-28 | End: 2023-03-05

## 2023-02-28 RX ADMIN — BUSPIRONE HYDROCHLORIDE 7.5 MG: 5 TABLET ORAL at 08:02

## 2023-02-28 RX ADMIN — POLYETHYLENE GLYCOL 3350 17 G: 17 POWDER, FOR SOLUTION ORAL at 08:02

## 2023-02-28 RX ADMIN — GABAPENTIN 400 MG: 400 CAPSULE ORAL at 08:02

## 2023-02-28 RX ADMIN — DILTIAZEM HYDROCHLORIDE 120 MG: 120 CAPSULE, COATED, EXTENDED RELEASE ORAL at 08:02

## 2023-02-28 RX ADMIN — OXYCODONE AND ACETAMINOPHEN 1 TABLET: 7.5; 325 TABLET ORAL at 03:02

## 2023-02-28 RX ADMIN — TIZANIDINE 4 MG: 4 TABLET ORAL at 05:02

## 2023-02-28 RX ADMIN — BUDESONIDE 0.5 MG: 0.5 INHALANT ORAL at 07:02

## 2023-02-28 RX ADMIN — ALPRAZOLAM 0.5 MG: 0.5 TABLET ORAL at 01:02

## 2023-02-28 RX ADMIN — MONTELUKAST 10 MG: 10 TABLET, FILM COATED ORAL at 08:02

## 2023-02-28 RX ADMIN — SENNOSIDES AND DOCUSATE SODIUM 1 TABLET: 50; 8.6 TABLET ORAL at 08:02

## 2023-02-28 RX ADMIN — ARFORMOTEROL TARTRATE 15 MCG: 15 SOLUTION RESPIRATORY (INHALATION) at 07:02

## 2023-02-28 RX ADMIN — IPRATROPIUM BROMIDE 0.5 MG: 0.5 SOLUTION RESPIRATORY (INHALATION) at 12:02

## 2023-02-28 RX ADMIN — ALPRAZOLAM 0.5 MG: 0.5 TABLET ORAL at 05:02

## 2023-02-28 RX ADMIN — IPRATROPIUM BROMIDE 0.5 MG: 0.5 SOLUTION RESPIRATORY (INHALATION) at 07:02

## 2023-02-28 RX ADMIN — PANTOPRAZOLE SODIUM 40 MG: 40 TABLET, DELAYED RELEASE ORAL at 08:02

## 2023-02-28 RX ADMIN — LEVOTHYROXINE SODIUM 125 MCG: 125 TABLET ORAL at 07:02

## 2023-02-28 RX ADMIN — GABAPENTIN 400 MG: 400 CAPSULE ORAL at 01:02

## 2023-02-28 RX ADMIN — CALCITRIOL CAPSULES 0.25 MCG 0.25 MCG: 0.25 CAPSULE ORAL at 08:02

## 2023-02-28 RX ADMIN — OXYCODONE AND ACETAMINOPHEN 1 TABLET: 7.5; 325 TABLET ORAL at 09:02

## 2023-02-28 NOTE — PROGRESS NOTES
[facility]  Pulmonology Progress Note    Patient Name: Christine Horner  MRN: [id number]  Admission Date: 2/20/2023   Hospital Length of Stay: 8 days  Code Status: Full Code    Attending Provider: Ike Allen MD    Principal Problem: Pneumonia involving right lung  Subjective:   History of Present Illness:  Christine Horner is a 63 year old female with a known past medical history of anemia, arthritis, COPD, chronic respiratory failure on home oxygen at 3L NC, pneumonia, and chronic back pain who presented to the ED 2/20 with increased oxygen requirements. Of note, she was discharged from the hospital last week on Levaquin for pneumonia. Home health went to see patient today and found her oxygen saturation to be 82% on 3L.  Her flow was increased to 5L with slow improvement to low 90s. She was instructed to come to the ED for further evaluation. She reports she was tired and weak over the weekend. Workup in the ED with increasing density in right lung on CT chest. She reports cough is becoming productive. The sputum is yellow greenish color. Previously cough was not productive. Hospital medicine consulted for admission. Discussed CT findings with patient. Pulmonary was consulted for the above.    At the time of my exam in the ER, the pt is awake and alert on 3L NC. She is able to speak in full sentences and is without any specific compliants. No family at bedside.    Interval History:  2/21: did not sleep last night as she boarded in the ER, remains on 3L NC, with cough that is nonproductive   2/22: slept well overnight, on home flow 3L, with some cough but not much sputum production  2/23: remains on home flow 3L NC, pt reports some variability in her HR with exertion so has not been able to walk the halls yet, asking about breakfast  2/24: on home flow 3L NC, pt sleeping but awakens easily to voice,  at bedside, resp status stable, plans for liver bx s/t US/CT findings    2/28: remains on home flow of 3L/NC; generalized weakness; post liver biopsy yesterday; denies acute complaints    Objective:     Vital Signs (Most Recent):  Temp: 97.6 °F (36.4 °C) (02/28/23 0505)  Pulse: 86 (02/28/23 0711)  Resp: 18 (02/28/23 0711)  BP: 104/69 (02/28/23 0505)  SpO2: (!) 93 % (02/28/23 0711)   Vital Signs (24h Range):  Temp:  [97.5 °F (36.4 °C)-97.9 °F (36.6 °C)] 97.6 °F (36.4 °C)  Pulse:  [] 86  Resp:  [10-89] 18  SpO2:  [90 %-98 %] 93 %  BP: ()/(48-70) 104/69   Weight: 65.2 kg (143 lb 11.8 oz)  Body mass index is 23.92 kg/m².    Intake/Output Summary (Last 24 hours) at 2/28/2023 0808  Last data filed at 2/28/2023 0549  Gross per 24 hour   Intake 95.92 ml   Output 1800 ml   Net -1704.08 ml     Physical Exam  Vitals and nursing note reviewed.   HENT:      Head: Normocephalic.   Eyes:      Conjunctiva/sclera: Conjunctivae normal.   Cardiovascular:      Rate and Rhythm: Normal rate.   Pulmonary:      Effort: Pulmonary effort is normal.      Breath sounds: Normal breath sounds.   Abdominal:      Palpations: Abdomen is soft.   Musculoskeletal:      Cervical back: Normal range of motion and neck supple.      Right lower leg: Edema present.      Left lower leg: Edema present.   Skin:     General: Skin is warm and dry.      Findings: Bruising present.   Neurological:      Mental Status: She is alert and oriented to person, place, and time.   Psychiatric:         Mood and Affect: Mood normal.     Review of Systems: 12 point ROS negative except as indicated in HPI    Significant Labs:  CBC/Anemia Profile:  Recent Labs   Lab 02/27/23  0506 02/28/23  0540   WBC 19.62* 13.95*   HGB 8.5* 7.7*   HCT 27.2* 25.1*    199   MCV 88 88   RDW 15.6* 15.5*     Assessment/Plan:     Acute on chronic respiratory failure with hypoxia  Patient with acute on chronic hypoxic and hypercapnic respiratory failure. She is on chronic home oxygen at 3L/NC and has continued supplemental oxygen while inpatient at  3L. Signs and symptoms of respiratory failure included worsening hypoxia despite her home flow oxygen. Contributing diagnoses included pneumonia. Labs and images were reviewed. Patient has not had a recent ABG. Will treat underlying causes and adjust management of respiratory failure as follows:    · on 3l/NC currently; on home settings  · continue Brovana, Pulmicort  · Prn Xopenex  · Complete antibiotic course   · Will need pulmonary follow-up upon discharge  · Needs follow-up imaging with possible need for bronchoscopy in 8-12 weeks    * Pneumonia involving right lung  · Previous pseudomonas pneumonia in Nov 2022 and completed course of Levaquin oral; returned to the ER 2/20 for increased oxygen requirements at home per her home ritika team   · Continue Merrem  · Post liver biopsy 2/27  · Needs close follow up of imaging. Imaging this hospitalization with a rounded area which is difficult to rule out an underlying mass. Needs follow-up imaging and potential bronch/lung biopsy if lesion present. Awaiting pathology results of liver lesions which may provide diagnosis    Papillary thyroid carcinoma  · Followed by heme/onc inpatient  · Followed by Dr. Prieto as an outpt; continue follow-up upon discharge    CLL (chronic lymphocytic leukemia)  · Heme/onc following inpatient  · Follow-up with outpt heme/onc  · Liver biopsy on 2/27; pathology pending    Liver mass  · Post biopsy on 2/27  · Follow pathology results    Valorie Sullivan NP  Pulmonary and Critical Care  [facility]

## 2023-02-28 NOTE — PT/OT/SLP PROGRESS
Physical Therapy  Treatment    Christine Horner   MRN: [id number]   Admitting Diagnosis: Pneumonia involving right lung    PT Received On: 02/28/23  PT Start Time: 0825     PT Stop Time: 0850    PT Total Time (min): 25 min       Billable Minutes:  Therapeutic Activity 25    Treatment Type: Treatment  PT/PTA: PTA     PTA Visit Number: 2       General Precautions: Standard, fall  Orthopedic Precautions: N/A  Braces: N/A  Respiratory Status: Nasal cannula, flow 3 L/min    Spiritual, Cultural Beliefs, Synagogue Practices, Values that Affect Care: no    Subjective:  Communicated with patient's nurse, Evy, and completed Epic chart review prior to session.  Patient agreed to PT session with max encouragement from therapist.     Pain/Comfort  Pain Rating 1: 8/10  Location - Orientation 1: generalized  Pain Addressed 1: Nurse notified, Other (see comments) (ACTIVIY PACING)  Pain Rating Post-Intervention 1: 8/10    Objective:   Patient found with: telemetry, PureWick, PICC line, oxygen    Patient initially highly lethargic but improved with mobility and increased time in upright position.     Supine > sit: Modified Independent    Seated EOB x 15 min total focusing on increased tolerance to upright position, core stability, trunk control and CV endurance.   Maintained self supported sitting balance with SBA  Maintained unsupported sitting balance independently    Patient refused OOB, chair T/F and ambulation until medication administration. Nurse arrived with meds during session but patient then began to refuse until she ate breakfast.   Educated patient on importance of increased tolerance to upright position and direct impact on CV endurance and strength. Patient encouraged to sit up in chair/ EOB, for a minimum of 2 consecutive hours, 3x per day. Encouraged patient to perform AROM TE to BLE throughout the day within all available planes of motion. Re enforced importance of utilizing call light to meet needs in room and  not attempt to get up without staff assistance. Patient verbalized understanding and agreed to comply.     AM-PAC 6 CLICK MOBILITY  How much help from another person does this patient currently need?   1 = Unable, Total/Dependent Assistance  2 = A lot, Maximum/Moderate Assistance  3 = A little, Minimum/Contact Guard/Supervision  4 = None, Modified [place]/Independent    Turning over in bed (including adjusting bedclothes, sheets and blankets)?: 4  Sitting down on and standing up from a chair with arms (e.g., wheelchair, bedside commode, etc.): 1 (REFUSED)  Moving from lying on back to sitting on the side of the bed?: 4  Moving to and from a bed to a chair (including a wheelchair)?: 1 (REFUSED)  Need to walk in hospital room?: 1 (REFUSED)  Climbing 3-5 steps with a railing?: 1  Basic Mobility Total Score: 12    AM-PAC Raw Score CMS G-Code Modifier Level of Impairment Assistance   6 % Total / Unable   7 - 9 CM 80 - 100% Maximal Assist   10 - 14 CL 60 - 80% Moderate Assist   15 - 19 CK 40 - 60% Moderate Assist   20 - 22 CJ 20 - 40% Minimal Assist   23 CI 1-20% SBA / CGA   24 CH 0% Independent/ Mod I     Patient left sitting edge of bed (supported with cushions & pillows) with all lines intact and call button in reach.    Assessment:  Christine Horner is a 63 y.o. female with a medical diagnosis of Pneumonia involving right lung and presents with overall decline in functional mobility. Patient would continue to benefit from skilled PT to address functional limitations listed below in order to return to PLOF/decrease caregiver burden. Patient demonstrated some self limiting behaviors during today's session. Patient has met bed mobility LTG established in POC.    Rehab identified problem list/impairments: weakness, impaired endurance, impaired self care skills, impaired functional mobility, gait instability, impaired balance, impaired cognition, decreased coordination, decreased upper extremity function,  decreased lower extremity function, decreased safety awareness, pain, decreased ROM, impaired cardiopulmonary response to activity    Rehab potential is fair.    Activity tolerance: Poor    Discharge recommendations: home health PT (W/ 24/7 CARE)      Barriers to discharge:      Equipment recommendations: none     GOALS:   Multidisciplinary Problems       Physical Therapy Goals          Problem: Physical Therapy    Goal Priority Disciplines Outcome Goal Variances Interventions   Physical Therapy Goal     PT, PT/OT Ongoing, Progressing     Description: Goals to be met by 3/7/23  Pt will complete bed mobility MOD I.  Pt will complete sit to stand MOD I.  Pt will ambulate 150ft MOD I using RW.                       PLAN:    Patient to be seen 3 x/week to address the above listed problems via gait training, therapeutic activities, therapeutic exercises  Plan of Care expires: 03/07/23  Plan of Care reviewed with: patient         02/28/2023

## 2023-02-28 NOTE — ASSESSMENT & PLAN NOTE
· Followed by heme/onc inpatient  · Followed by Dr. Prieto as an outpt; continue follow-up upon discharge

## 2023-02-28 NOTE — ASSESSMENT & PLAN NOTE
· Heme/onc following inpatient  · Follow-up with outpt heme/onc  · Liver biopsy on 2/27; pathology pending

## 2023-02-28 NOTE — ASSESSMENT & PLAN NOTE
· Previous pseudomonas pneumonia in Nov 2022 and completed course of Levaquin oral; returned to the ER 2/20 for increased oxygen requirements at home per her home ritika team   · Continue Merrem  · Post liver biopsy 2/27  · Needs close follow up of imaging. Imaging this hospitalization with a rounded area which is difficult to rule out an underlying mass. Needs follow-up imaging and potential bronch/lung biopsy if lesion present. Awaiting pathology results of liver lesions which may provide diagnosis

## 2023-02-28 NOTE — PT/OT/SLP PROGRESS
Occupational Therapy   Treatment    Name: Christine Horner  MRN: [id number]  Admitting Diagnosis:  Pneumonia involving right lung       Recommendations:     Discharge Recommendations: home health OT (with 24/7 care)  Discharge Equipment Recommendations:  none  Barriers to discharge:   none    Assessment:     Christine Horner is a 63 y.o. female with a medical diagnosis of Pneumonia involving right lung.  She presents with the following performance deficits affecting function are weakness, impaired endurance, impaired functional mobility, impaired self care skills, impaired balance, decreased safety awareness, pain, decreased coordination, impaired cognition, impaired cardiopulmonary response to activity, decreased ROM.     Rehab Prognosis:  Fair; patient would benefit from acute skilled OT services to address these deficits and reach maximum level of function.       Plan:     Patient to be seen 2 x/week to address the above listed problems via self-care/home management, therapeutic activities, therapeutic exercises  Plan of Care Expires: 03/09/23  Plan of Care Reviewed with: patient    Subjective     Pain/Comfort:  Pain Rating 1: 8/10  Pain Addressed 1: Nurse notified, administered medication mid-session, activity pacing  Pain Rating Post-Intervention 1: 8/10    Objective:     Communicated with: NurseEvy, prior to session.  Patient found supine with PICC line, telemetry, PureWick, oxygen upon OT entry to room.    General Precautions: Standard, fall    Orthopedic Precautions:N/A  Braces: N/A  Respiratory Status: Nasal cannula, flow 3 L/min     Occupational Performance:     Bed Mobility:    Patient completed Supine to Sit with modified independence with increased time and v/c for hand placement.   Patient sat EOB x 15 min with independence to increase activity tolerance for ADL completion.  Static Sitting Balance: good    Functional Mobility/Transfers:  Patient declined participation in OOB transfer to chair  "and ambulation until medication administration.   Nurse presented to room and administered medication mid-session and patient continued to decline OOB activities because "medicine takes an hour to kick in."    Activities of Daily Living:  Feeding:  set up A Patient completed eating breakfast with set up A for item retrieval.  Grooming: set up A Patient washed face with set up A for item retrieval.    [facility] 6 Click ADL: 14    Treatment & Education:  Upon entry, patient lethargic but patient improved and became more alert with increased time.   Patient educated on importance of OOB activities. Patient encouraged to call for A to get OOB to bedside chair for lunch and dinner. Patient verbalized understanding.    Patient left sitting edge of bed with pillows behind for support with all lines intact, call button in reach, and bed alarm on    GOALS:   Multidisciplinary Problems       Occupational Therapy Goals          Problem: Occupational Therapy    Goal Priority Disciplines Outcome Interventions   Occupational Therapy Goal     OT, PT/OT Ongoing, Progressing    Description: Goals to be met by: 3/9/23     Patient will increase functional independence with ADLs by performing:    Toileting from bedside commode with Minimal Assistance for hygiene and clothing management.   Stand pivot transfers with Supervision.  Upper extremity exercise program x20 reps per handout, with independence.                         Time Tracking:     OT Date of Treatment: 02/28/23  OT Start Time: 0825  OT Stop Time: 0850  OT Total Time (min): 25 min    Billable Minutes:Therapeutic Activity 25        LISSETH Drew         2/28/2023  "

## 2023-02-28 NOTE — PLAN OF CARE
Problem: Adult Inpatient Plan of Care  Goal: Plan of Care Review  Outcome: Ongoing, Progressing  Goal: Patient-Specific Goal (Individualized)  Outcome: Ongoing, Progressing  Goal: Absence of Hospital-Acquired Illness or Injury  Outcome: Ongoing, Progressing  Goal: Optimal Comfort and Wellbeing  Outcome: Ongoing, Progressing  Goal: Readiness for Transition of Care  Outcome: Ongoing, Progressing     Problem: Fluid Imbalance (Pneumonia)  Goal: Fluid Balance  Outcome: Ongoing, Progressing     Problem: Infection (Pneumonia)  Goal: Resolution of Infection Signs and Symptoms  Outcome: Ongoing, Progressing     Problem: Respiratory Compromise (Pneumonia)  Goal: Effective Oxygenation and Ventilation  Outcome: Ongoing, Progressing     Problem: Impaired Wound Healing  Goal: Optimal Wound Healing  Outcome: Ongoing, Progressing     Problem: Pain Acute  Goal: Acceptable Pain Control and Functional Ability  Outcome: Ongoing, Progressing     Problem: Fall Injury Risk  Goal: Absence of Fall and Fall-Related Injury  Outcome: Ongoing, Progressing     Problem: Skin Injury Risk Increased  Goal: Skin Health and Integrity  Outcome: Ongoing, Progressing     Problem: Infection  Goal: Absence of Infection Signs and Symptoms  Outcome: Ongoing, Progressing     Problem: Anxiety  Goal: Anxiety Reduction or Resolution  Outcome: Ongoing, Progressing

## 2023-02-28 NOTE — ASSESSMENT & PLAN NOTE
Patient with acute on chronic hypoxic and hypercapnic respiratory failure. She is on chronic home oxygen at 3L/NC and has continued supplemental oxygen while inpatient at 3L. Signs and symptoms of respiratory failure included worsening hypoxia despite her home flow oxygen. Contributing diagnoses included pneumonia. Labs and images were reviewed. Patient has not had a recent ABG. Will treat underlying causes and adjust management of respiratory failure as follows:    · on 3l/NC currently; on home settings  · continue Brovana, Pulmicort  · Prn Xopenex  · Complete antibiotic course   · Will need pulmonary follow-up upon discharge  · Needs follow-up imaging with possible need for bronchoscopy in 8-12 weeks

## 2023-02-28 NOTE — DISCHARGE SUMMARY
[facility] Medicine  Discharge Summary      Patient Name: Christine Horner  MRN: [id number]  [facility] [id number]  Patient Class: IP- Inpatient  Admission Date: 2/20/2023  Hospital Length of Stay: 8 days  Discharge Date and Time:  02/28/2023 9:45 AM  Attending Physician: Ike Allen MD   Discharging Provider: Ike Allen MD  Primary Care Provider: Eloy Hdez MD    Primary Care Team: Networked reference to record PCT     HPI:   The patient is a 62 yo female with past medical history of anemia, arthritis, COPD, chronic respiratory failure on home oxygen, pneumonia and chronic back pain who presented to the ED with increased oxygen requirements. She is usually on 3L nasal cannula. She was discharged from the hospital last week on Levaquin for pneumonia. Home health went to see patient today and found her oxygen saturation to be 82% on 3L.  Her flow was increased to 5L with slow improvement to low 90s. She was instructed to come to the ED for further evaluation. She reports she was tired and weak over the weekend. Workup in the ED with increasing density in right lung on CT chest. She reports cough is becoming productive. The sputum is yellow greenish color. Previously cough was not productive.Hospital medicine consulted for admission. Discussed CT findings with patient.       * No surgery found *      Hospital Course:   63 year old female, under the management of Hospital Medicine for pneumonia, acute on chronic respiratory failure. Patient O2 requirements decreased overnight, currently at baseline with 3L NC, SpO2 stable: 95%. Currently treated with Doxycycline and Merrem due to Hx of respiratory pseudomonas (11/22). Sputum culture ordered, patient has not been able to produce sputum for culture, has been given 3% saline nebs to assist without success. BC: NGTD. PT/OT ordered. Patient with significant abdominal distension, KUB non-contributory. Abdominal US: concerning for liver masses. Ordered  triple-phase CT of Liver: Multiple large hypoenhancing masses in the liver, largest on the right measuring 12 x 10 cm, largest on the left measuring 2.2 x 1.9 cm, compatible with metastatic disease. Ordered AFP, consulted IR for possible Biopsy, consulted Hem/Onc. Discussed with patient, has Hx of Thyroid Cancer (2020), treated with thyroidectomy 1/2020, I-131 11/2020. Ordered Thyroglobulin Ab: elevated, AFP: normal. On 2/24/23, IR consulted for biopsy, plan for Monday, NPO after midnight.  Hematology-Oncology consult-awaiting tissue confirmation.  PT/OT evaluations completed with home health recommended.  Social work consulted to establish home health prior to discharge.    2/27 awaiting ir biopsy of liver mass. Anticipate discharge tomorrow with close follow up outpatient with primary oncologist.    2/18  Tolerated liver biopsy. Awaiting pathology. Outpatient appointment with primary oncologist scheduled prior to discharge. Will need pathology of liver mass prior to proceeding with lung mass.    Patient on baseline supplemental oxygen.     Patient seen and evaluated by me. Patient was determined to be suitable for d/c. Patient deemed stable for discharge to home with homehealth physical/occupational therapy and nurse practitioner to visit home program.         Goals of Care Treatment Preferences:  Code Status: Full Code      Consults:   Consults (From admission, onward)        Status Ordering Provider     Inpatient consult to Social Work/Case Management  Once        Provider:  (Not yet assigned)    Completed MAGDALENO RYAN     Inpatient consult to [facility] Oncology  Once        Provider:  Bhargavi Gonzalez MD    Completed BHARGAVI GONZALEZ     Inpatient consult to Social Work  Once        Provider:  (Not yet assigned)    Completed GABRIELLA PRICE     Inpatient consult to Interventional Radiology  Once        Provider:  Kirk Ortiz MD    Completed DAVID CAZARES     Inpatient consult to  Hematology/Oncology  Once        Provider:  (Not yet assigned)    Completed DAVID CAZARES.     Inpatient consult to Registered Dietitian/Nutritionist  Once        Provider:  (Not yet assigned)    Completed ADRIANE SYLVESTER     Inpatient consult to Pulmonology  Once        Provider:  (Not yet assigned)    Completed ADRIANE SYLVESTER          Pulmonary  * Pneumonia involving right lung  Previously treated for Pseudomonal pneumonia  Continue meropenem and doxycycline  f/u sputum culture- unable to collect  Appreciate pulmonology    Acute on chronic respiratory failure with hypoxia-resolved as of 2/28/2023  Patient with Hypercapnic and Hypoxic Respiratory failure which is Acute on chronic.  she is on home oxygen at 3 LPM. Supplemental oxygen was provided and noted- Oxygen Concentration (%):  [32] 32.   Signs/symptoms of respiratory failure include- tachypnea, increased work of breathing and lethargy. Contributing diagnoses includes - COPD, Pleural effusion and Pneumonia Labs and images were reviewed. Patient Has recent ABG, which has been reviewed. Will treat underlying causes and adjust management of respiratory failure as follows- IV antibiotics, supplemental oxygen, prn breathing treatments  -Pulmonology following  On baseline supplemental oxygen needs    Cardiac/Vascular  Paroxysmal SVT (supraventricular tachycardia)  Continue home medications  Heart rate currently controlled      Oncology  Papillary thyroid carcinoma  Thyroidectomy: 1/2020, Radiation ablation: 11/2020, managed by Dr. Prieto at [facility]    --Continue levothyroxine      CLL (chronic lymphocytic leukemia)  Leukocytosis secondary to CLL, Followed by outside heme-onc    --Daily CBC        Endocrine  Postablative hypothyroidism  Hx of thyroid cancer, surgical removal 1/2020, I-131 11/2020, followed by Dr. Feliciano at [facility].       --Continue levothyroxine  --Thyroglobulin Ab: elevated  --Patient to continue care with Dr. Feliciano, advised patient to call  the office in the AM to scheduled follow-up      GI  Liver mass  Increasing abdominal distension, ordered Abdominal US: multiple liver masses, ordered CT Liver: Multiple large hypoenhancing masses in the liver, largest on the right measuring 12 x 10 cm, largest on the left measuring 2.2 x 1.9 cm, compatible with metastatic disease. Hx of Thyroid Cancer    --Thyroglobulin Ab, AFP- to evaluate  -IR consulted for biopsy-  to be rescheduled Monday  -Consult Hem/Onc- following, recommend Bx of Liver  -patient to follow with primary Oncologist- advised patient to call her oncologist office in the AM to schedule follow-up and to update on changes    Awaiting liver biopsy      Other  Early satiety  Patient reports early satiety, likely secondary to liver mass, possible malignancy    --Advised patient to limit fluids with meals to allow for more nutritional intake without becoming full from liquids  --Stop consuming liquids approx. 1 hr prior to meal times to allow the stomach to empty  --Eat multiple small meals per day (5-6) rather than waiting for 3 meals, this will allow for smaller portions spread out and may increase caloric intake  --Protonix 40mg PO Daily      Final Active Diagnoses:    Diagnosis Date Noted POA    PRINCIPAL PROBLEM:  Pneumonia involving right lung [J18.9] 02/20/2023 Yes    Early satiety [R68.81] 02/26/2023 No    Liver mass [R16.0] 02/23/2023 Yes    Postablative hypothyroidism [E89.0] 02/14/2023 Yes    Paroxysmal SVT (supraventricular tachycardia) [I47.1] 02/14/2023 Yes    CLL (chronic lymphocytic leukemia) [C91.10] 02/14/2023 Yes    Papillary thyroid carcinoma [C73] 02/10/2020 Yes      Problems Resolved During this Admission:    Diagnosis Date Noted Date Resolved POA    Acute on chronic respiratory failure with hypoxia [J96.21] 04/24/2016 02/28/2023 Yes       Discharged Condition: stable    Disposition:     Follow Up:   Contact information for follow-up providers     eBlla Yanez MD.  Go on 3/8/2023.    Specialty: Hematology and Oncology  Why: March 8th at 1:00pm  Contact information:  [address]  [facility] [address]  [phone]             Eloy Hdez MD. Schedule an appointment as soon as possible for a visit in 3 day(s).    Specialty: Family Medicine  Why: hospital follow up  Contact information:  [address] [facility] [address]  [phone]             Sunny Prieto MD. Schedule an appointment as soon as possible for a visit in 1 week(s).    Specialty: Pulmonary Disease  Why: hospital follow up  Contact information:  [address] [facility] [address]  [phone]                   Contact information for after-discharge care     Home Medical Care     [facility] .    Service: Home Health Services  Contact information:  [address] [facility] [address]  [phone]                           Patient Instructions:      Ambulatory referral/consult to Home Health   Referral Priority: Routine Referral Type: Home Health   Referral Reason: Specialty Services Required   Requested Specialty: Home Health Services   Number of Visits Requested: 1     Ambulatory referral/consult to [facility]   Standing Status: Future   Referral Priority: Routine Referral Type: Consultation   Referral Reason: Specialty Services Required   Number of Visits Requested: 1     Diet Adult Regular     HOME HEALTH ORDERS     Order Specific Question Answer Comments   What Home Health Agency is the patient currently using? Other/External [facility]     HOME HEALTH ORDERS     Order Specific Question Answer Comments   What Home Health Agency is the patient currently using? [facility]      SUBSEQUENT HOME HEALTH ORDERS     Order Specific Question Answer Comments   What Home Health Agency is the patient currently using? [facility]      Activity as tolerated       Significant Diagnostic Studies:  Labs:   CMP No results for input(s): NA, K, CL, CO2, GLU, BUN, CREATININE, CALCIUM, PROT, ALBUMIN, BILITOT, ALKPHOS, AST, ALT, ANIONGAP, ESTGFRAFRICA, EGFRNONAA in the last 48 hours., CBC   Recent Labs   Lab 02/27/23  0506 02/28/23  0540   WBC 19.62* 13.95*   HGB 8.5* 7.7*   HCT 27.2* 25.1*    199    and All labs within the past 24 hours have been reviewed  Microbiology:   Blood Culture   Lab Results   Component Value Date    LABBLOO No growth after 5 days. 02/20/2023    LABBLOO No growth after 5 days. 02/20/2023     Radiology: X-Ray: CXR: X-Ray Chest 1 View (CXR):   Results for orders placed or performed during the hospital encounter of 02/20/23   X-Ray Chest 1 View    Narrative    EXAMINATION:  XR CHEST 1 VIEW    CLINICAL HISTORY:  pneumonia;    FINDINGS:  Single view of the chest.  Comparison 02/20/2023.    Cardiac silhouette is normal.  Moderate atherosclerotic disease.  Masslike density seen within the right mid lung zone.  Patchy interstitial infiltrates are seen throughout the right lower lobe with multiple nodular densities in the right infrahilar region.  Cannot exclude primary neoplasm.  Recommend pulmonary referral and possible bronchoscopy or tissue sampling.  No evidence of pleural effusion or pneumothorax.  Bones demonstrate moderate degenerative changes.  Chronic bilateral rib fractures are suspected.  Vertebroplasty cement seen within the midthoracic region.      Impression    Masslike density seen within the right mid lung zone. Patchy interstitial infiltrates are seen throughout the right lower lobe with multiple nodular densities in the right infrahilar region.  Cannot exclude primary neoplasm. Recommend pulmonary referral and possible bronchoscopy or tissue sampling.      Electronically signed by: Darrel Biswas MD  Date:    02/27/2023  Time:    07:21    and portable and KUB: X-Ray Abdomen AP 1 View (KUB):   Results for orders placed or performed during the hospital encounter of 02/20/23    X-Ray Abdomen AP 1 View    Narrative    EXAMINATION:  XR ABDOMEN AP 1 VIEW    CLINICAL HISTORY:  Constipation;    TECHNIQUE:  AP View(s) of the abdomen was performed.    COMPARISON:  Chest CT scan from November 20, 2015    FINDINGS:  There is dense contrast within portions of the descending colon and sigmoid colon.  There is moderate air throughout nondilated loops of large and small bowel.  There is moderate stool within the ascending colon, adjacent to an enlarged liver.  Negative for free air.  There are mild degenerative changes of the spine and pelvis with convex right curvature of the lumbar spine.      Impression    1.  Negative for acute process.  Nonspecific bowel gas pattern.    2.  Stable findings as noted above.      Electronically signed by: Jorge Brown MD  Date:    02/22/2023  Time:    14:57     CT scan: CT ABDOMEN PELVIS WITH CONTRAST: No results found for this visit on 02/20/23., CT ABDOMEN PELVIS WITHOUT CONTRAST: No results found for this visit on 02/20/23. and CT head wo contrast; cta chest noncoronary  Ultrasound: abdomen limited    Pending Diagnostic Studies:     Procedure Component Value Units Date/Time    Specimen to Pathology, Radiology Liver biopsy [[id number]] Collected: 02/27/23 1520    Order Status: Sent Lab Status: In process Updated: 02/27/23 1520         Medications:  Reconciled Home Medications:      Medication List      CONTINUE taking these medications    * albuterol 2.5 mg /3 mL (0.083 %) nebulizer solution  Commonly known as: PROVENTIL  INHALE 1 VIAL VIA NEBULIZER EVERY 4 HOURS AS NEEDED **THANK YOU**     * albuterol 90 mcg/actuation inhaler  Commonly known as: PROAIR HFA  INHALE TWO PUFFS EVERY 4 TO 6 HOURS **THANK YOU**     busPIRone 7.5 MG tablet  Commonly known as: BUSPAR  Take 7.5 mg by mouth 2 (two) times a day.     calcitRIOL 0.25 MCG Cap  Commonly known as: ROCALTROL  Take 0.25 mcg by mouth once daily.     calcium carbonate-magnesium hydroxide 550-110 mg Chew  Commonly  known as: ROLAIDS  Take 2,000 mg by mouth 3 (three) times daily as needed.     diltiaZEM 120 MG Cp24  Commonly known as: CARDIZEM CD  Take 120 mg by mouth 2 (two) times daily.     fluticasone propionate 50 mcg/actuation nasal spray  Commonly known as: FLONASE  2 sprays by Each Nare route 2 (two) times daily.     fluticasone-salmeterol 500-50 mcg/dose 500-50 mcg/dose Dsdv diskus inhaler  Commonly known as: ADVAIR  Inhale 1 puff into the lungs 2 (two) times daily.     gabapentin 400 MG capsule  Commonly known as: NEURONTIN  Take by mouth 4 (four) times daily.     levothyroxine 125 MCG tablet  Commonly known as: SYNTHROID  Take 125 mcg by mouth before breakfast.     montelukast 10 mg tablet  Commonly known as: SINGULAIR  Take 10 mg by mouth once daily.     ondansetron 4 MG Tbdl  Commonly known as: ZOFRAN-ODT  Take 1 tablet (4 mg total) by mouth every 12 (twelve) hours as needed (nausea or vomiting).     oxyCODONE-acetaminophen 7.5-325 mg per tablet  Commonly known as: PERCOCET  Take 1 tablet by mouth 4 (four) times daily as needed.     tiotropium 18 mcg inhalation capsule  Commonly known as: SPIRIVA  Inhale 18 mcg into the lungs once daily. Controller         * This list has 2 medication(s) that are the same as other medications prescribed for you. Read the directions carefully, and ask your doctor or other care provider to review them with you.            STOP taking these medications    tiZANidine 4 MG tablet  Commonly known as: ZANAFLEX            Indwelling Lines/Drains at time of discharge:   Lines/Drains/Airways     None                 Time spent on the discharge of patient: 51 minutes         Ike Allen MD  Department of Hospital Medicine  [facility]

## 2023-02-28 NOTE — SUBJECTIVE & OBJECTIVE
Christine Horner is a 63 year old female with a known past medical history of anemia, arthritis, COPD, chronic respiratory failure on home oxygen at 3L NC, pneumonia, and chronic back pain who presented to the ED 2/20 with increased oxygen requirements. Of note, she was discharged from the hospital last week on Levaquin for pneumonia. Home health went to see patient today and found her oxygen saturation to be 82% on 3L.  Her flow was increased to 5L with slow improvement to low 90s. She was instructed to come to the ED for further evaluation. She reports she was tired and weak over the weekend. Workup in the ED with increasing density in right lung on CT chest. She reports cough is becoming productive. The sputum is yellow greenish color. Previously cough was not productive. Hospital medicine consulted for admission. Discussed CT findings with patient. Pulmonary was consulted for the above.    At the time of my exam in the ER, the pt is awake and alert on 3L NC. She is able to speak in full sentences and is without any specific compliants. No family at bedside.    Interval History:  2/21: did not sleep last night as she boarded in the ER, remains on 3L NC, with cough that is nonproductive   2/22: slept well overnight, on home flow 3L, with some cough but not much sputum production  2/23: remains on home flow 3L NC, pt reports some variability in her HR with exertion so has not been able to walk the halls yet, asking about breakfast  2/24: on home flow 3L NC, pt sleeping but awakens easily to voice,  at bedside, resp status stable, plans for liver bx s/t US/CT findings   2/28: remains on home flow of 3L/NC; generalized weakness; post liver biopsy yesterday; denies acute complaints    Objective:     Vital Signs (Most Recent):  Temp: 97.6 °F (36.4 °C) (02/28/23 0505)  Pulse: 86 (02/28/23 0711)  Resp: 18 (02/28/23 0711)  BP: 104/69 (02/28/23 0505)  SpO2: (!) 93 % (02/28/23 0711)   Vital Signs (24h Range):  Temp:   [97.5 °F (36.4 °C)-97.9 °F (36.6 °C)] 97.6 °F (36.4 °C)  Pulse:  [] 86  Resp:  [10-89] 18  SpO2:  [90 %-98 %] 93 %  BP: ()/(48-70) 104/69   Weight: 65.2 kg (143 lb 11.8 oz)  Body mass index is 23.92 kg/m².    Intake/Output Summary (Last 24 hours) at 2/28/2023 0808  Last data filed at 2/28/2023 0549  Gross per 24 hour   Intake 95.92 ml   Output 1800 ml   Net -1704.08 ml     Physical Exam  Vitals and nursing note reviewed.   HENT:      Head: Normocephalic.   Eyes:      Conjunctiva/sclera: Conjunctivae normal.   Cardiovascular:      Rate and Rhythm: Normal rate.   Pulmonary:      Effort: Pulmonary effort is normal.      Breath sounds: Normal breath sounds.   Abdominal:      Palpations: Abdomen is soft.   Musculoskeletal:      Cervical back: Normal range of motion and neck supple.      Right lower leg: Edema present.      Left lower leg: Edema present.   Skin:     General: Skin is warm and dry.      Findings: Bruising present.   Neurological:      Mental Status: She is alert and oriented to person, place, and time.   Psychiatric:         Mood and Affect: Mood normal.     Review of Systems    Significant Labs:  CBC/Anemia Profile:  Recent Labs   Lab 02/27/23  0506 02/28/23  0540   WBC 19.62* 13.95*   HGB 8.5* 7.7*   HCT 27.2* 25.1*    199   MCV 88 88   RDW 15.6* 15.5*   Chemistries:  No results for input(s): NA, K, CL, CO2, BUN, CREATININE, CALCIUM, ALBUMIN, PROT, BILITOT, ALKPHOS, ALT, AST, GLUCOSE, MG, PHOS in the last 48 hours.

## 2023-02-28 NOTE — PHYSICIAN QUERY
PT Name: Christine Horner  MR #: [id number]     DOCUMENTATION CLARIFICATION     CDS/: Coby Reardon RN CDS            Contact information:[email or web address]  This form is a permanent document in the medical record.     Query Date: February 28, 2023    By submitting this query, we are merely seeking further clarification of documentation.  Please utilize your independent clinical judgment when addressing the question(s) below.    The Medical Record contains the following:   Indicators   Supporting Clinical Findings Location in Medical Record    Non-blanchable erythema/redness      Ulcer/Injury/Skin Breakdown      Deep Tissue Injury       X Wound care consult Less than 1x1cm partial thickness opening to her coccyx with moist pink wound bed and surrounding scarring consistent with healing stage 2 pressure injury. Wound Care, LI Putnam RN, 2/22     X Acute/Chronic Illness Acute illness:PNA , CLL, Acute on Chronic resp failure with hypoxia  Chronic Illness:arthritis, COPD, chronic resp failure on home oxygen H&P, Dr. García, 2/20     X Medication/Treatment 1. Cleanse with saline or bath wipes 2. Pat dry 3. Apply thin layer of critic aide paste 4. Perform twice daily and with hygiene care Wound Care Order, 2/22    Other       The clinical guidelines noted are only a system guideline. It does not replace the providers clinical judgment.    Per the National Pressure Injury Advisory Panel:   A pressure injury is localized damage to the skin and underlying soft tissue usually over a bony prominence or related to a medical or other device. The injury can present as intact skin or an open ulcer and may be painful. The injury occurs as a result of intense and/or prolonged pressure or pressure in combination with shear. The tolerance of soft tissue for pressure and shear may also be affected by microclimate, nutrition, perfusion, co-morbidities and condition of the soft tissue.       Stage 1 Pressure Injury:  Intact  skin with a localized area of non-blanchable erythema, which may appear differently in darkly pigmented skin. Color changes do not include purple or maroon discoloration; these may indicate deep tissue pressure injury.    Stage 2 Pressure Injury:  Partial-thickness loss of skin with exposed dermis. The wound bed is viable, pink or red, moist, and may also present as an intact or ruptured serum-filled blister.    Stage 3 Pressure Injury:  Full-thickness loss of skin, in which adipose (fat) is visible in the ulcer and granulation tissue and epibole (rolled wound edges) are often present. Slough and/or eschar may be visible. Undermining and tunneling may occur.    Stage 4 Pressure Injury:  Full-thickness skin and tissue loss with exposed or directly palpable fascia, muscle, tendon, ligament, cartilage or bone in the ulcer. Slough and/or eschar may be visible. Epibole (rolled edges), undermining and/or tunneling often occur.    Unstageable Pressure Injury:  Full-thickness skin and tissue loss in which the extent of tissue damage within the ulcer cannot be confirmed because it is obscured by slough or eschar. If slough or eschar is removed, a Stage 3 or Stage 4 pressure injury will be revealed.    Deep Tissue Pressure Injury:  Intact or non-intact skin with localized area of persistent non-blanchable deep red, maroon, purple discoloration or epidermal separation revealing a dark wound bed or blood filled blister. This injury results from intense and/or prolonged pressure and shear forces at the bone-muscle interface. The wound may evolve rapidly to reveal the actual extent of tissue injury, or may resolve without tissue loss. If necrotic tissue, subcutaneous tissue, granulation tissue, fascia, muscle or other underlying structures are visible, this indicates a full thickness pressure injury (Unstageable, Stage 3 or Stage 4). Do not use DTPI to describe vascular, traumatic, neuropathic, or dermatologic conditions.        Provider, please provide the integumentary diagnosis related to the documentation of Coccyx:     [  x ] Pressure Injury/Decubitus Ulcer, Stage 2     [   ] Other Integumentary Diagnosis (please specify):______________     [  ] Clinically Undetermined             Please document in your progress notes daily for the duration of treatment until resolved and include in your discharge summary.    Reference:    MARIA ELENA Levy., SREE Gomez., Goldberg, M., MARIA ELENA Knox, MARIA ELENA Baker, & JOSELYN Walker. (2016). Revised National Pressure Ulcer Advisory Panel Pressure Injury Staging System: Revised Pressure Injury Staging System. J Wound Ostomy Continence Nurs, 43(6), 585-597. doi:10.1097/won.3151162602978913    Form No.84743

## 2023-03-01 ENCOUNTER — PES CALL (OUTPATIENT)
Dept: ADMINISTRATIVE | Facility: CLINIC | Age: 63
End: 2023-03-01
Payer: COMMERCIAL

## 2023-03-02 ENCOUNTER — OFFICE VISIT (OUTPATIENT)
Dept: HOME HEALTH SERVICES | Facility: CLINIC | Age: 63
End: 2023-03-02
Payer: COMMERCIAL

## 2023-03-02 ENCOUNTER — PATIENT MESSAGE (OUTPATIENT)
Dept: ADMINISTRATIVE | Facility: CLINIC | Age: 63
End: 2023-03-02
Payer: COMMERCIAL

## 2023-03-02 ENCOUNTER — PATIENT OUTREACH (OUTPATIENT)
Dept: ADMINISTRATIVE | Facility: CLINIC | Age: 63
End: 2023-03-02
Payer: COMMERCIAL

## 2023-03-02 VITALS
HEART RATE: 77 BPM | TEMPERATURE: 97 F | SYSTOLIC BLOOD PRESSURE: 100 MMHG | DIASTOLIC BLOOD PRESSURE: 60 MMHG | OXYGEN SATURATION: 90 % | RESPIRATION RATE: 20 BRPM

## 2023-03-02 DIAGNOSIS — F41.9 ANXIETY: Primary | ICD-10-CM

## 2023-03-02 DIAGNOSIS — R16.0 LIVER MASS: ICD-10-CM

## 2023-03-02 PROCEDURE — 1159F PR MEDICATION LIST DOCUMENTED IN MEDICAL RECORD: ICD-10-PCS | Mod: CPTII,S$GLB,, | Performed by: NURSE PRACTITIONER

## 2023-03-02 PROCEDURE — 3074F PR MOST RECENT SYSTOLIC BLOOD PRESSURE < 130 MM HG: ICD-10-PCS | Mod: CPTII,S$GLB,, | Performed by: NURSE PRACTITIONER

## 2023-03-02 PROCEDURE — 3074F SYST BP LT 130 MM HG: CPT | Mod: CPTII,S$GLB,, | Performed by: NURSE PRACTITIONER

## 2023-03-02 PROCEDURE — 1160F RVW MEDS BY RX/DR IN RCRD: CPT | Mod: CPTII,S$GLB,, | Performed by: NURSE PRACTITIONER

## 2023-03-02 PROCEDURE — 3078F DIAST BP <80 MM HG: CPT | Mod: CPTII,S$GLB,, | Performed by: NURSE PRACTITIONER

## 2023-03-02 PROCEDURE — 99496 TRANSITIONAL CARE MANAGE SERVICE 7 DAY DISCHARGE: ICD-10-PCS | Mod: S$GLB,,, | Performed by: NURSE PRACTITIONER

## 2023-03-02 PROCEDURE — 1160F PR REVIEW ALL MEDS BY PRESCRIBER/CLIN PHARMACIST DOCUMENTED: ICD-10-PCS | Mod: CPTII,S$GLB,, | Performed by: NURSE PRACTITIONER

## 2023-03-02 PROCEDURE — 3078F PR MOST RECENT DIASTOLIC BLOOD PRESSURE < 80 MM HG: ICD-10-PCS | Mod: CPTII,S$GLB,, | Performed by: NURSE PRACTITIONER

## 2023-03-02 PROCEDURE — 1159F MED LIST DOCD IN RCRD: CPT | Mod: CPTII,S$GLB,, | Performed by: NURSE PRACTITIONER

## 2023-03-02 PROCEDURE — 99496 TRANSJ CARE MGMT HIGH F2F 7D: CPT | Mod: S$GLB,,, | Performed by: NURSE PRACTITIONER

## 2023-03-02 RX ORDER — BUSPIRONE HYDROCHLORIDE 7.5 MG/1
7.5 TABLET ORAL 2 TIMES DAILY
Qty: 60 TABLET | Refills: 1 | Status: SHIPPED | OUTPATIENT
Start: 2023-03-02 | End: 2023-05-01

## 2023-03-02 NOTE — PROGRESS NOTES
[facility]  Transition of Care Home Visit    Visit Date: 3/2/2023  Encounter Provider: Lita Sun   PCP:  [facility]    PRESENTING HISTORY      Patient ID: Christine Horner is a 63 y.o. female.    Consult Requested By:  Dr. Ike Allen  Reason for Consult:  Hospital Follow Up.    Christine is being seen at home due to being seen at home due to physical debility that presents a taxing effort to leave the home, to mitigate high risk of hospital readmission and/or due to the limited availability of reliable or safe options for transportation to the point of access to the provider. Prior to treatment on this visit the chart was reviewed and patient verbal consent was obtained.      Chief Complaint: Transitional Care    Patient was admitted to hospital on 02/21 and discharged to home on 02/28    History of Present Illness: Ms. Christine Horner is a 63 y.o. female who was recently admitted to the hospital with a PMHx of anemia, CLL, and COPD who presents to the Emergency Department for SOB upon exertion which onset gradually. Pt reports that she was admitted at this facility last week for the same complaint. Pt reports that she received IV abx (merrum for h/o of pseudomonas pna, negative blood cultures) during her admission for PNA and a UTI and was discharged with 2 abx (levaquin) and zofran. Pt is unable to specify which abx she was discharged with. Today, the pt was advised to come back to the ER by home health because her O2 saturation was 80% on 4L and only got into the 90s when her O2 was increased to 5L. Pt is on 3L of O2 at baseline. Symptoms are intermittent and moderate in severity. Associated sxs include palpitations, a productive cough with greenish-yellow sputum, nausea, and light-headedness. Pt also c/o dysuria. Patient denies any new back pain, fever, CP, V/D, appetite change, and all other sxs at this time. Pt is a former smoker of 10 years. Pt also has CLL and states that she is followed  by Dr. Yanez (Hematology and Oncology), but reports that she has never received any treatment for CLL. No further complaints or concerns at this time.  Patient is requesting a dose of her tizanidine for her chronic back pain.      ___________________________________________________________________    Today:    HPI:  Patient is beoing seen today for a transitional care visit. See hospital course for details. Upon arrival patient was lying in her bed AAOx3 in no acute distress. Patient is on 4L NC continuous. She has an oxygen concentrator. Medications reviewed with the patient and patient verbalizes compliance to all medications. VSS.          Review of Systems   Constitutional:  Positive for fatigue. Negative for fever.   HENT: Negative.     Eyes:  Positive for visual disturbance (reads with glasses).   Respiratory:  Positive for cough (non productive cough) and shortness of breath.    Cardiovascular: Negative.    Gastrointestinal: Negative.    Endocrine: Negative.    Genitourinary: Negative.    Musculoskeletal:  Positive for arthralgias, back pain and myalgias.   Skin: Negative.    Allergic/Immunologic: Negative.    Neurological:  Positive for weakness.   Hematological: Negative.    Psychiatric/Behavioral:  The patient is nervous/anxious.      Assessments:  Environmental: Patient lives in a single story home with her . There are no steps at the entrance and lighting is dim and temperature is comfortable  Functional Status: Patient is at this time bed/chair bound but can transfer to the bedside commode with assist.  Safety: Fall Precautions  Nutritional: Adequate food in the home  Home Health/DME/Supplies: [facility], DMEs: wheelchair, bedside commode, walker, oxygen concentrator     PAST HISTORY:     Past Medical History:   Diagnosis Date    Anemia 2/14/2023    Arthritis     Chronic back pain     COPD (chronic obstructive pulmonary disease)     COPD with acute exacerbation 04/24/2016     Pneumonia     SOB (shortness of breath)        Past Surgical History:   Procedure Laterality Date    BACK SURGERY       SECTION      TONSILLECTOMY      WISDOM TOOTH EXTRACTION         Family History   Problem Relation Age of Onset    Cancer Father     Diabetes Maternal Grandmother        Social History     Socioeconomic History    Marital status:    Tobacco Use    Smoking status: Former     Packs/day: 1.00     Years: 30.00     Pack years: 30.00     Types: Cigarettes     Quit date: 1/3/2012     Years since quittin.1   Substance and Sexual Activity    Alcohol use: No    Drug use: No    Sexual activity: Never     Birth control/protection: None       MEDICATIONS & ALLERGIES:     Current Outpatient Medications on File Prior to Visit   Medication Sig Dispense Refill    albuterol (PROAIR HFA) 90 mcg/actuation inhaler INHALE TWO PUFFS EVERY 4 TO 6 HOURS **THANK YOU** 8.5 g 11    albuterol (PROVENTIL) 2.5 mg /3 mL (0.083 %) nebulizer solution INHALE 1 VIAL VIA NEBULIZER EVERY 4 HOURS AS NEEDED **THANK YOU** 360 mL 11    calcitRIOL (ROCALTROL) 0.25 MCG Cap Take 0.25 mcg by mouth once daily.      calcium carbonate-magnesium hydroxide (ROLAIDS) 550-110 mg Chew Take 2,000 mg by mouth 3 (three) times daily as needed.      diltiaZEM (CARDIZEM CD) 120 MG Cp24 Take 120 mg by mouth 2 (two) times daily.      fluticasone (FLONASE) 50 mcg/actuation nasal spray 2 sprays by Each Nare route 2 (two) times daily. 16 g 11    fluticasone-salmeterol diskus inhaler 500-50 mcg Inhale 1 puff into the lungs 2 (two) times daily.      gabapentin (NEURONTIN) 400 MG capsule Take by mouth 4 (four) times daily.       levothyroxine (SYNTHROID) 125 MCG tablet Take 125 mcg by mouth before breakfast.      montelukast (SINGULAIR) 10 mg tablet Take 10 mg by mouth once daily.      ondansetron (ZOFRAN-ODT) 4 MG TbDL Take 1 tablet (4 mg total) by mouth every 8 (eight) hours as needed (nausea and vomiting). 10 tablet 0     oxyCODONE-acetaminophen (PERCOCET) 7.5-325 mg per tablet Take 1 tablet by mouth 4 (four) times daily as needed.      tiotropium (SPIRIVA) 18 mcg inhalation capsule Inhale 18 mcg into the lungs once daily. Controller      [DISCONTINUED] busPIRone (BUSPAR) 7.5 MG tablet Take 7.5 mg by mouth 2 (two) times a day.       No current facility-administered medications on file prior to visit.        Review of patient's allergies indicates:   Allergen Reactions    Ciprofloxacin Itching and Rash    Nitrofurantoin monohyd/m-cryst Hives    Penicillins Hives     Patient broke out in hives. Patient also said she had hives in her throat       OBJECTIVE:     Vital Signs:  Vitals:    03/02/23 1129   BP: 100/60   Pulse: 77   Resp: 20   Temp: 97 °F (36.1 °C)     There is no height or weight on file to calculate BMI.     Physical Exam:  Physical Exam  Vitals reviewed.   Constitutional:       General: She is not in acute distress.  HENT:      Head: Normocephalic and atraumatic.   Cardiovascular:      Rate and Rhythm: Normal rate and regular rhythm.      Pulses: Normal pulses.      Heart sounds: Normal heart sounds.   Pulmonary:      Breath sounds: Examination of the right-upper field reveals decreased breath sounds. Examination of the left-upper field reveals decreased breath sounds. Examination of the right-lower field reveals decreased breath sounds. Examination of the left-lower field reveals decreased breath sounds. Decreased breath sounds present.   Abdominal:      General: Bowel sounds are normal.      Palpations: Abdomen is soft.   Musculoskeletal:      Cervical back: Normal range of motion.      Right lower leg: No edema.      Left lower leg: No edema.   Skin:     General: Skin is warm and dry.      Capillary Refill: Capillary refill takes less than 2 seconds.   Neurological:      Mental Status: She is alert and oriented to person, place, and time.      Motor: Weakness present.   Psychiatric:         Mood and Affect: Mood normal.          Behavior: Behavior normal.         Thought Content: Thought content normal.         Judgment: Judgment normal.       Laboratory  Lab Results   Component Value Date    WBC 13.95 (H) 02/28/2023    HGB 7.7 (L) 02/28/2023    HCT 25.1 (L) 02/28/2023    MCV 88 02/28/2023     02/28/2023     Lab Results   Component Value Date    INR 1.0 02/24/2023    INR 1.1 04/19/2016    INR 1.0 11/20/2015     No results found for: HGBA1C  No results for input(s): POCTGLUCOSE in the last 72 hours.    Diagnostic Results:      TRANSITION OF CARE:     [facility] Contact Note: 03/01/2023    Family and/or Caretaker present at visit?  Yes.  Diagnostic tests reviewed/disposition: No diagnosic tests pending after this hospitalization.  Disease/illness education: COPD  Home health/community services discussion/referrals: Patient has home health established at [facility] .   Establishment or re-establishment of referral orders for community resources: No other necessary community resources.   Discussion with other health care providers: No discussion with other health care providers necessary.     Transition of Care Visit:  I have reviewed and updated the history and problem list.  I have reconciled the medication list.  I have discussed the hospitalization and current medical issues, prognosis and plans with the patient/family.  I  spent more than 50% of time discussing the care with the patient/family.  Total Face-to-Face Encounter: 60 minutes.    Medications Reconciliation:   I have reconciled the patient's home medications and discharge medications with the patient/family. I have updated all changes.  Refer to After-Visit Medication List.    ASSESSMENT & PLAN:     HIGH RISK CONDITION(S):  COPD, CLL, Thyriod Carcinoma, Liver Mass    1. Anxiety  -     busPIRone (BUSPAR) 7.5 MG tablet; Take 1 tablet (7.5 mg total) by mouth 2 (two) times a day.  Dispense: 60 tablet; Refill: 1    2. Liver mass  -     Ambulatory  referral/consult to [facility]     Encounter for Medical Follow-Up and Medication Review  - [facility] at [facility] to schedule follow-up visit with patient in 4 weeks or PRN     Patient Instructions Given:  - Continue all medications, treatments and therapies as ordered.   - Follow all instructions, recommendations as discussed.  - Maintain Safety Precautions at all times.  - Attend all medical appointments as scheduled.  - For worsening symptoms: call Primary Care Physician or Nurse Practitioner.  - For emergencies, call 911 or immediately report to the nearest emergency room       Were controlled substances prescribed?  No    Instructions for the patient:    Scheduled Follow-up :  Future Appointments   Date Time Provider Department Center   4/13/2023  8:00 AM Lita Duran NP [facility]       After Visit Medication List :     Medication List            Accurate as of March 2, 2023  3:17 PM. If you have any questions, ask your nurse or doctor.                CONTINUE taking these medications      * albuterol 2.5 mg /3 mL (0.083 %) nebulizer solution  Commonly known as: PROVENTIL  INHALE 1 VIAL VIA NEBULIZER EVERY 4 HOURS AS NEEDED **THANK YOU**     * albuterol 90 mcg/actuation inhaler  Commonly known as: PROAIR HFA  INHALE TWO PUFFS EVERY 4 TO 6 HOURS **THANK YOU**     busPIRone 7.5 MG tablet  Commonly known as: BUSPAR  Take 1 tablet (7.5 mg total) by mouth 2 (two) times a day.     calcitRIOL 0.25 MCG Cap  Commonly known as: ROCALTROL     calcium carbonate-magnesium hydroxide 550-110 mg Chew  Commonly known as: ROLAIDS     diltiaZEM 120 MG Cp24  Commonly known as: CARDIZEM CD     fluticasone propionate 50 mcg/actuation nasal spray  Commonly known as: FLONASE  2 sprays by Each Nare route 2 (two) times daily.     fluticasone-salmeterol 500-50 mcg/dose 500-50 mcg/dose Dsdv diskus inhaler  Commonly known as: ADVAIR     gabapentin 400 MG capsule  Commonly known as: NEURONTIN      levothyroxine 125 MCG tablet  Commonly known as: SYNTHROID     montelukast 10 mg tablet  Commonly known as: SINGULAIR     ondansetron 4 MG Tbdl  Commonly known as: ZOFRAN-ODT  Take 1 tablet (4 mg total) by mouth every 8 (eight) hours as needed (nausea and vomiting).     oxyCODONE-acetaminophen 7.5-325 mg per tablet  Commonly known as: PERCOCET     tiotropium 18 mcg inhalation capsule  Commonly known as: SPIRIVA           * This list has 2 medication(s) that are the same as other medications prescribed for you. Read the directions carefully, and ask your doctor or other care provider to review them with you.                   Where to Get Your Medications        These medications were sent to [facility] - [facility] [address] [id number] [address] [address]      Phone: [phone]   busPIRone 7.5 MG tablet         Signature: Lita Sun NP

## 2023-03-15 ENCOUNTER — TELEPHONE (OUTPATIENT)
Dept: HEMATOLOGY/ONCOLOGY | Facility: CLINIC | Age: 63
End: 2023-03-15
Payer: COMMERCIAL

## 2023-03-15 DIAGNOSIS — R16.0 LIVER MASS: Primary | ICD-10-CM

## 2023-03-15 LAB
COMMENT: NORMAL
FINAL PATHOLOGIC DIAGNOSIS: NORMAL
GROSS: NORMAL
Lab: NORMAL
MICROSCOPIC EXAM: NORMAL
SUPPLEMENTAL DIAGNOSIS: NORMAL

## 2023-03-30 ENCOUNTER — EXTERNAL HOME HEALTH (OUTPATIENT)
Dept: HOME HEALTH SERVICES | Facility: HOSPITAL | Age: 63
End: 2023-03-30
Payer: COMMERCIAL

## 2023-05-16 NOTE — PROGRESS NOTES
[facility] Medicine  Progress Note    Patient Name: Christine Horner  MRN: [id number]  Patient Class: IP- Inpatient   Admission Date: 2/20/2023  Length of Stay: 6 days  Attending Physician: Ramon Tenorio, *  Primary Care Provider: Eloy Hdez MD        Subjective:     Principal Problem:Pneumonia involving right lung        HPI:  The patient is a 64 yo female with past medical history of anemia, arthritis, COPD, chronic respiratory failure on home oxygen, pneumonia and chronic back pain who presented to the ED with increased oxygen requirements. She is usually on 3L nasal cannula. She was discharged from the hospital last week on Levaquin for pneumonia. Home health went to see patient today and found her oxygen saturation to be 82% on 3L.  Her flow was increased to 5L with slow improvement to low 90s. She was instructed to come to the ED for further evaluation. She reports she was tired and weak over the weekend. Workup in the ED with increasing density in right lung on CT chest. She reports cough is becoming productive. The sputum is yellow greenish color. Previously cough was not productive.Hospital medicine consulted for admission. Discussed CT findings with patient.       Overview/Hospital Course:  63 year old female, under the management of Hospital Medicine for pneumonia, acute on chronic respiratory failure. Patient O2 requirements decreased overnight, currently at baseline with 3L NC, SpO2 stable: 95%. Currently treated with Doxycycline and Merrem due to Hx of respiratory pseudomonas (11/22). Sputum culture ordered, patient has not been able to produce sputum for culture, has been given 3% saline nebs to assist without success. BC: NGTD. PT/OT ordered. Patient with significant abdominal distension, KUB non-contributory. Abdominal US: concerning for liver masses. Ordered triple-phase CT of Liver: Multiple large hypoenhancing masses in the liver, largest on the right measuring 12 x  10 cm, largest on the left measuring 2.2 x 1.9 cm, compatible with metastatic disease. Ordered AFP, consulted IR for possible Biopsy, consulted Hem/Onc. Discussed with patient, has Hx of Thyroid Cancer (2020), treated with thyroidectomy 1/2020, I-131 11/2020. Ordered Thyroglobulin Ab: elevated, AFP: normal. On 2/24/23, IR consulted for biopsy, plan for Monday, NPO after midnight.  Hematology-Oncology consult-awaiting tissue confirmation.  PT/OT evaluations completed with home health recommended.  Social work consulted to establish home health prior to discharge.        Interval History: If stable following Bx, can discharge per IR recs. Labs stable, improved appetite. Early satiety reported per patient.     Review of Systems   Constitutional:  Positive for activity change, appetite change and fatigue. Negative for chills, diaphoresis, fever and unexpected weight change.   HENT:  Negative for congestion, hearing loss, mouth sores, postnasal drip, rhinorrhea, sore throat and trouble swallowing.    Eyes:  Negative for discharge and visual disturbance.   Respiratory:  Positive for shortness of breath. Negative for cough and chest tightness.    Cardiovascular:  Negative for chest pain, palpitations and leg swelling.   Gastrointestinal:  Positive for abdominal distention and abdominal pain. Negative for blood in stool, constipation, diarrhea, nausea and vomiting.   Endocrine: Negative for cold intolerance and heat intolerance.   Genitourinary:  Negative for difficulty urinating, dyspareunia, flank pain and hematuria.   Musculoskeletal:  Negative for arthralgias, back pain and myalgias.   Skin: Negative.    Neurological:  Positive for weakness. Negative for dizziness, light-headedness and headaches.   Hematological:  Negative for adenopathy. Does not bruise/bleed easily.   Psychiatric/Behavioral:  Negative for agitation, behavioral problems and confusion. The patient is nervous/anxious.    Objective:     Vital Signs (Most  Recent):  Temp: 98.6 °F (37 °C) (02/26/23 1108)  Pulse: (!) 112 (02/26/23 1108)  Resp: 16 (02/26/23 1108)  BP: 116/76 (02/26/23 1108)  SpO2: (!) 93 % (02/26/23 1108)   Vital Signs (24h Range):  Temp:  [97.2 °F (36.2 °C)-98.6 °F (37 °C)] 98.6 °F (37 °C)  Pulse:  [] 112  Resp:  [16-20] 16  SpO2:  [93 %-97 %] 93 %  BP: (106-141)/(68-82) 116/76     Weight: 65.2 kg (143 lb 11.8 oz)  Body mass index is 23.92 kg/m².    Intake/Output Summary (Last 24 hours) at 2/26/2023 1118  Last data filed at 2/26/2023 0609  Gross per 24 hour   Intake 166.98 ml   Output 1550 ml   Net -1383.02 ml      Physical Exam  Vitals and nursing note reviewed.   Constitutional:       General: She is not in acute distress.     Appearance: She is well-developed. She is ill-appearing.   HENT:      Head: Normocephalic and atraumatic.      Right Ear: Hearing and external ear normal.      Left Ear: Hearing and external ear normal.      Nose: No rhinorrhea.      Right Sinus: No maxillary sinus tenderness or frontal sinus tenderness.      Left Sinus: No maxillary sinus tenderness or frontal sinus tenderness.      Mouth/Throat:      Mouth: No oral lesions.      Pharynx: Uvula midline.   Eyes:      General:         Right eye: No discharge.         Left eye: No discharge.      Conjunctiva/sclera: Conjunctivae normal.      Pupils: Pupils are equal, round, and reactive to light.   Neck:      Thyroid: No thyromegaly.      Vascular: No carotid bruit.      Trachea: No tracheal deviation.   Cardiovascular:      Rate and Rhythm: Normal rate and regular rhythm.      Pulses:           Dorsalis pedis pulses are 2+ on the right side and 2+ on the left side.      Heart sounds: Normal heart sounds, S1 normal and S2 normal. No murmur heard.  Pulmonary:      Effort: Pulmonary effort is normal. No respiratory distress.      Breath sounds: Normal breath sounds.   Abdominal:      General: Bowel sounds are decreased. There is distension.      Palpations: Abdomen is soft.  There is no mass.      Tenderness: There is generalized abdominal tenderness.   Musculoskeletal:         General: Normal range of motion.      Cervical back: Normal range of motion.   Lymphadenopathy:      Cervical: No cervical adenopathy.      Upper Body:      Right upper body: No supraclavicular adenopathy.      Left upper body: No supraclavicular adenopathy.   Skin:     General: Skin is warm and dry.      Capillary Refill: Capillary refill takes less than 2 seconds.      Findings: No rash.   Neurological:      Mental Status: She is alert and oriented to person, place, and time.      Sensory: No sensory deficit.      Coordination: Coordination normal.      Gait: Gait normal.   Psychiatric:         Mood and Affect: Mood is not anxious or depressed.         Speech: Speech normal.         Behavior: Behavior normal.         Thought Content: Thought content normal.         Judgment: Judgment normal.       Significant Labs: All pertinent labs within the past 24 hours have been reviewed.  CBC:   Recent Labs   Lab 02/25/23  0511 02/26/23  0523   WBC 16.30* 19.39*   HGB 8.5* 8.6*   HCT 27.4* 28.0*    287     CMP:   Recent Labs   Lab 02/26/23  0523      K 4.2   CL 94*   CO2 31*   GLU 79   BUN 17   CREATININE 0.8   CALCIUM 7.4*   PROT 5.5*   ALBUMIN 2.4*   BILITOT 0.2   ALKPHOS 193*   AST 52*   ALT 13   ANIONGAP 14       Significant Imaging: I have reviewed all pertinent imaging results/findings within the past 24 hours.      Assessment/Plan:      * Pneumonia involving right lung  Previously treated for Pseudomonal pneumonia  Continue meropenem and doxycycline  f/u sputum culture- unable to collect  Appreciate pulmonology    Early satiety  Patient reports early satiety, likely secondary to liver mass, possible malignancy    --Advised patient to limit fluids with meals to allow for more nutritional intake without becoming full from liquids  --Stop consuming liquids approx. 1 hr prior to meal times to allow the  stomach to empty  --Eat multiple small meals per day (5-6) rather than waiting for 3 meals, this will allow for smaller portions spread out and may increase caloric intake  --Protonix 40mg PO Daily    Liver mass  Increasing abdominal distension, ordered Abdominal US: multiple liver masses, ordered CT Liver: Multiple large hypoenhancing masses in the liver, largest on the right measuring 12 x 10 cm, largest on the left measuring 2.2 x 1.9 cm, compatible with metastatic disease. Hx of Thyroid Cancer    --Thyroglobulin Ab, AFP- to evaluate  -IR consulted for biopsy-  to be rescheduled Monday  -Consult Hem/Onc- following, recommend Bx of Liver  -patient to follow with primary Oncologist- advised patient to call her oncologist office in the AM to schedule follow-up and to update on changes          Papillary thyroid carcinoma  Thyroidectomy: 1/2020, Radiation ablation: 11/2020, managed by Dr. Prieto at [facility]    --Continue levothyroxine      CLL (chronic lymphocytic leukemia)  Leukocytosis secondary to CLL, Followed by outside heme-onc    --Daily CBC        Postablative hypothyroidism  Hx of thyroid cancer, surgical removal 1/2020, I-131 11/2020, followed by Dr. Feliciano at [facility].       --Continue levothyroxine  --Thyroglobulin Ab: elevated  --Patient to continue care with Dr. Feliciano, advised patient to call the office in the AM to scheduled follow-up      Paroxysmal SVT (supraventricular tachycardia)  Continue home medications  Heart rate currently controlled      Acute on chronic respiratory failure with hypoxia  Patient with Hypercapnic and Hypoxic Respiratory failure which is Acute on chronic.  she is on home oxygen at 3 LPM. Supplemental oxygen was provided and noted- Oxygen Concentration (%):  [32] 32.   Signs/symptoms of respiratory failure include- tachypnea, increased work of breathing and lethargy. Contributing diagnoses includes - COPD, Pleural effusion and Pneumonia Labs and images were reviewed. Patient Has  recent ABG, which has been reviewed. Will treat underlying causes and adjust management of respiratory failure as follows- IV antibiotics, supplemental oxygen, prn breathing treatments  -Pulmonology following      VTE Risk Mitigation (From admission, onward)         Ordered     enoxaparin injection 40 mg  Daily         02/20/23 1656     IP VTE HIGH RISK PATIENT  Once         02/20/23 1656     Place sequential compression device  Until discontinued         02/20/23 1656                Discharge Planning   MARK:      Code Status: Full Code   Is the patient medically ready for discharge?:     Reason for patient still in hospital (select all that apply): Patient trending condition  Discharge Plan A: Home Health   Discharge Delays: None known at this time              Kinsey Huerta NP  Department of Hospital Medicine   [facility] - Med Surg   Isotretinoin Counseling: Patient should get monthly blood tests, not donate blood, not drive at night if vision affected, not share medication, and not undergo elective surgery for 6 months after tx completed. Side effects reviewed, pt to contact office should one occur.